# Patient Record
Sex: MALE | Race: WHITE | NOT HISPANIC OR LATINO | Employment: OTHER | URBAN - METROPOLITAN AREA
[De-identification: names, ages, dates, MRNs, and addresses within clinical notes are randomized per-mention and may not be internally consistent; named-entity substitution may affect disease eponyms.]

---

## 2019-10-24 ENCOUNTER — LAB (OUTPATIENT)
Dept: LAB | Facility: HOSPITAL | Age: 84
End: 2019-10-24

## 2019-10-24 ENCOUNTER — HOSPITAL ENCOUNTER (OUTPATIENT)
Dept: CARDIOLOGY | Facility: HOSPITAL | Age: 84
Discharge: HOME | End: 2019-10-24
Admitting: SURGERY

## 2019-10-24 ENCOUNTER — TRANSCRIBE ORDERS (OUTPATIENT)
Dept: ADMINISTRATIVE | Facility: HOSPITAL | Age: 84
End: 2019-10-24

## 2019-10-24 DIAGNOSIS — D64.9 ANEMIA, UNSPECIFIED TYPE: Primary | ICD-10-CM

## 2019-10-24 DIAGNOSIS — E87.8 ELECTROLYTE IMBALANCE: ICD-10-CM

## 2019-10-24 DIAGNOSIS — Z01.810 PRE-OPERATIVE CARDIOVASCULAR EXAMINATION: ICD-10-CM

## 2019-10-24 DIAGNOSIS — D64.9 ANEMIA, UNSPECIFIED TYPE: ICD-10-CM

## 2019-10-24 LAB
ALBUMIN SERPL-MCNC: 4 G/DL (ref 3.5–5.2)
ALBUMIN/GLOB SERPL: 1 G/DL
ALP SERPL-CCNC: 44 U/L (ref 39–117)
ALT SERPL W P-5'-P-CCNC: 16 U/L (ref 1–41)
ANION GAP SERPL CALCULATED.3IONS-SCNC: 9.2 MMOL/L (ref 5–15)
AST SERPL-CCNC: 20 U/L (ref 1–40)
BILIRUB SERPL-MCNC: 0.3 MG/DL (ref 0.2–1.2)
BUN BLD-MCNC: 10 MG/DL (ref 8–23)
BUN/CREAT SERPL: 9.3 (ref 7–25)
CALCIUM SPEC-SCNC: 9.8 MG/DL (ref 8.6–10.5)
CHLORIDE SERPL-SCNC: 90 MMOL/L (ref 98–107)
CO2 SERPL-SCNC: 30.8 MMOL/L (ref 22–29)
CREAT BLD-MCNC: 1.08 MG/DL (ref 0.76–1.27)
GFR SERPL CREATININE-BSD FRML MDRD: 65 ML/MIN/1.73
GLOBULIN UR ELPH-MCNC: 4.1 GM/DL
GLUCOSE BLD-MCNC: 165 MG/DL (ref 65–99)
POTASSIUM BLD-SCNC: 3.6 MMOL/L (ref 3.5–5.2)
PROT SERPL-MCNC: 8.1 G/DL (ref 6–8.5)
SODIUM BLD-SCNC: 130 MMOL/L (ref 136–145)

## 2019-10-24 PROCEDURE — 36415 COLL VENOUS BLD VENIPUNCTURE: CPT

## 2019-10-24 PROCEDURE — 80053 COMPREHEN METABOLIC PANEL: CPT

## 2019-10-24 PROCEDURE — 93005 ELECTROCARDIOGRAM TRACING: CPT | Performed by: SURGERY

## 2019-11-03 PROCEDURE — 93010 ELECTROCARDIOGRAM REPORT: CPT | Performed by: INTERNAL MEDICINE

## 2021-01-01 ENCOUNTER — APPOINTMENT (OUTPATIENT)
Dept: CT IMAGING | Facility: HOSPITAL | Age: 86
End: 2021-01-01

## 2021-01-01 ENCOUNTER — HOSPITAL ENCOUNTER (OUTPATIENT)
Dept: PAIN MEDICINE | Facility: HOSPITAL | Age: 86
Discharge: HOME | End: 2021-09-07

## 2021-01-01 ENCOUNTER — OFFICE (OUTPATIENT)
Dept: URBAN - METROPOLITAN AREA CLINIC 64 | Facility: CLINIC | Age: 86
End: 2021-01-01

## 2021-01-01 ENCOUNTER — APPOINTMENT (OUTPATIENT)
Dept: MRI IMAGING | Facility: HOSPITAL | Age: 86
End: 2021-01-01

## 2021-01-01 ENCOUNTER — HOSPITAL ENCOUNTER (INPATIENT)
Facility: HOSPITAL | Age: 86
LOS: 7 days | End: 2021-10-13
Attending: EMERGENCY MEDICINE | Admitting: FAMILY MEDICINE

## 2021-01-01 ENCOUNTER — APPOINTMENT (OUTPATIENT)
Dept: GENERAL RADIOLOGY | Facility: HOSPITAL | Age: 86
End: 2021-01-01

## 2021-01-01 ENCOUNTER — OFFICE VISIT (OUTPATIENT)
Dept: PAIN MEDICINE | Facility: CLINIC | Age: 86
End: 2021-01-01

## 2021-01-01 ENCOUNTER — APPOINTMENT (OUTPATIENT)
Dept: OTHER | Facility: HOSPITAL | Age: 86
End: 2021-01-01

## 2021-01-01 ENCOUNTER — TELEPHONE (OUTPATIENT)
Dept: PAIN MEDICINE | Facility: CLINIC | Age: 86
End: 2021-01-01

## 2021-01-01 ENCOUNTER — APPOINTMENT (OUTPATIENT)
Dept: ULTRASOUND IMAGING | Facility: HOSPITAL | Age: 86
End: 2021-01-01

## 2021-01-01 ENCOUNTER — TELEPHONE (OUTPATIENT)
Dept: PAIN MEDICINE | Facility: HOSPITAL | Age: 86
End: 2021-01-01

## 2021-01-01 ENCOUNTER — OFFICE (OUTPATIENT)
Dept: URBAN - METROPOLITAN AREA CLINIC 64 | Facility: CLINIC | Age: 86
End: 2021-01-01
Payer: COMMERCIAL

## 2021-01-01 VITALS
HEART RATE: 106 BPM | HEIGHT: 69 IN | BODY MASS INDEX: 27.43 KG/M2 | OXYGEN SATURATION: 90 % | DIASTOLIC BLOOD PRESSURE: 66 MMHG | TEMPERATURE: 98.2 F | SYSTOLIC BLOOD PRESSURE: 110 MMHG | RESPIRATION RATE: 24 BRPM | WEIGHT: 185.19 LBS

## 2021-01-01 VITALS
DIASTOLIC BLOOD PRESSURE: 82 MMHG | HEART RATE: 97 BPM | HEIGHT: 69 IN | SYSTOLIC BLOOD PRESSURE: 124 MMHG | WEIGHT: 195 LBS

## 2021-01-01 VITALS
DIASTOLIC BLOOD PRESSURE: 71 MMHG | WEIGHT: 190 LBS | RESPIRATION RATE: 16 BRPM | SYSTOLIC BLOOD PRESSURE: 149 MMHG | HEART RATE: 82 BPM | HEIGHT: 69 IN | OXYGEN SATURATION: 96 % | TEMPERATURE: 97.8 F | BODY MASS INDEX: 28.14 KG/M2

## 2021-01-01 VITALS
DIASTOLIC BLOOD PRESSURE: 58 MMHG | HEART RATE: 98 BPM | SYSTOLIC BLOOD PRESSURE: 78 MMHG | HEIGHT: 69 IN | WEIGHT: 196 LBS

## 2021-01-01 VITALS
HEIGHT: 69 IN | WEIGHT: 193 LBS | SYSTOLIC BLOOD PRESSURE: 105 MMHG | HEART RATE: 78 BPM | DIASTOLIC BLOOD PRESSURE: 105 MMHG

## 2021-01-01 VITALS
SYSTOLIC BLOOD PRESSURE: 129 MMHG | BODY MASS INDEX: 28.14 KG/M2 | RESPIRATION RATE: 16 BRPM | DIASTOLIC BLOOD PRESSURE: 69 MMHG | WEIGHT: 190 LBS | OXYGEN SATURATION: 92 % | HEIGHT: 69 IN | HEART RATE: 89 BPM

## 2021-01-01 VITALS
WEIGHT: 195 LBS | HEART RATE: 101 BPM | DIASTOLIC BLOOD PRESSURE: 70 MMHG | HEIGHT: 69 IN | SYSTOLIC BLOOD PRESSURE: 120 MMHG

## 2021-01-01 VITALS
BODY MASS INDEX: 28.14 KG/M2 | DIASTOLIC BLOOD PRESSURE: 80 MMHG | SYSTOLIC BLOOD PRESSURE: 155 MMHG | OXYGEN SATURATION: 95 % | HEIGHT: 69 IN | HEART RATE: 85 BPM | RESPIRATION RATE: 16 BRPM | WEIGHT: 190 LBS

## 2021-01-01 DIAGNOSIS — Z79.899 HIGH RISK MEDICATION USE: ICD-10-CM

## 2021-01-01 DIAGNOSIS — M50.30 DDD (DEGENERATIVE DISC DISEASE), CERVICAL: ICD-10-CM

## 2021-01-01 DIAGNOSIS — K59.00 CONSTIPATION, UNSPECIFIED: ICD-10-CM

## 2021-01-01 DIAGNOSIS — M47.812 CERVICAL SPONDYLOSIS: ICD-10-CM

## 2021-01-01 DIAGNOSIS — R53.1 WEAKNESS: ICD-10-CM

## 2021-01-01 DIAGNOSIS — Z79.899 HIGH RISK MEDICATION USE: Primary | ICD-10-CM

## 2021-01-01 DIAGNOSIS — K60.2 ANAL FISSURE, UNSPECIFIED: ICD-10-CM

## 2021-01-01 DIAGNOSIS — K62.5 HEMORRHAGE OF ANUS AND RECTUM: ICD-10-CM

## 2021-01-01 DIAGNOSIS — L22: ICD-10-CM

## 2021-01-01 DIAGNOSIS — Z09 FOLLOW UP: ICD-10-CM

## 2021-01-01 DIAGNOSIS — E87.1 HYPONATREMIA: ICD-10-CM

## 2021-01-01 DIAGNOSIS — D72.829 ELEVATED WHITE BLOOD CELL COUNT, UNSPECIFIED: ICD-10-CM

## 2021-01-01 DIAGNOSIS — M50.30 DDD (DEGENERATIVE DISC DISEASE), CERVICAL: Primary | ICD-10-CM

## 2021-01-01 DIAGNOSIS — K62.89 OTHER SPECIFIED DISEASES OF ANUS AND RECTUM: ICD-10-CM

## 2021-01-01 DIAGNOSIS — R91.8 LUNG MASS: ICD-10-CM

## 2021-01-01 DIAGNOSIS — E87.6 HYPOKALEMIA: ICD-10-CM

## 2021-01-01 DIAGNOSIS — A41.9 SEPSIS, DUE TO UNSPECIFIED ORGANISM, UNSPECIFIED WHETHER ACUTE ORGAN DYSFUNCTION PRESENT (HCC): Primary | ICD-10-CM

## 2021-01-01 DIAGNOSIS — J18.9 PNEUMONIA OF BOTH LUNGS DUE TO INFECTIOUS ORGANISM, UNSPECIFIED PART OF LUNG: ICD-10-CM

## 2021-01-01 DIAGNOSIS — R52 PAIN: ICD-10-CM

## 2021-01-01 LAB
ALBUMIN SERPL-MCNC: 2.5 G/DL (ref 3.5–5.2)
ALBUMIN SERPL-MCNC: 2.6 G/DL (ref 3.5–5.2)
ALBUMIN SERPL-MCNC: 2.6 G/DL (ref 3.5–5.2)
ALBUMIN SERPL-MCNC: 2.7 G/DL (ref 3.5–5.2)
ALBUMIN SERPL-MCNC: 2.9 G/DL (ref 3.5–5.2)
ALBUMIN SERPL-MCNC: 3 G/DL (ref 3.5–5.2)
ALBUMIN SERPL-MCNC: 3.3 G/DL (ref 3.5–5.2)
ALBUMIN/GLOB SERPL: 0.7 G/DL
ALBUMIN/GLOB SERPL: 0.8 G/DL
ALBUMIN/GLOB SERPL: 0.8 G/DL
ALP SERPL-CCNC: 68 U/L (ref 39–117)
ALP SERPL-CCNC: 70 U/L (ref 39–117)
ALP SERPL-CCNC: 74 U/L (ref 39–117)
ALP SERPL-CCNC: 77 U/L (ref 39–117)
ALP SERPL-CCNC: 782 U/L (ref 39–117)
ALP SERPL-CCNC: 83 U/L (ref 39–117)
ALP SERPL-CCNC: 88 U/L (ref 39–117)
ALT SERPL W P-5'-P-CCNC: 177 U/L (ref 1–41)
ALT SERPL W P-5'-P-CCNC: 198 U/L (ref 1–41)
ALT SERPL W P-5'-P-CCNC: 412 U/L (ref 1–41)
ALT SERPL W P-5'-P-CCNC: 59 U/L (ref 1–41)
ALT SERPL W P-5'-P-CCNC: 592 U/L (ref 1–41)
ALT SERPL W P-5'-P-CCNC: 881 U/L (ref 1–41)
ALT SERPL W P-5'-P-CCNC: 92 U/L (ref 1–41)
ANION GAP SERPL CALCULATED.3IONS-SCNC: 12 MMOL/L (ref 5–15)
ANION GAP SERPL CALCULATED.3IONS-SCNC: 13 MMOL/L (ref 5–15)
ANION GAP SERPL CALCULATED.3IONS-SCNC: 13 MMOL/L (ref 5–15)
ANION GAP SERPL CALCULATED.3IONS-SCNC: 14 MMOL/L (ref 5–15)
ANION GAP SERPL CALCULATED.3IONS-SCNC: 16 MMOL/L (ref 5–15)
ANION GAP SERPL CALCULATED.3IONS-SCNC: 24 MMOL/L (ref 5–15)
ANION GAP SERPL CALCULATED.3IONS-SCNC: 8 MMOL/L (ref 5–15)
ANION GAP SERPL CALCULATED.3IONS-SCNC: 9 MMOL/L (ref 5–15)
APTT PPP: 30.8 SECONDS (ref 24–31)
ARTERIAL PATENCY WRIST A: POSITIVE
AST SERPL-CCNC: 145 U/L (ref 1–40)
AST SERPL-CCNC: 155 U/L (ref 1–40)
AST SERPL-CCNC: 236 U/L (ref 1–40)
AST SERPL-CCNC: 260 U/L (ref 1–40)
AST SERPL-CCNC: 67 U/L (ref 1–40)
AST SERPL-CCNC: 814 U/L (ref 1–40)
AST SERPL-CCNC: 965 U/L (ref 1–40)
ATMOSPHERIC PRESS: ABNORMAL MM[HG]
BACTERIA SPEC AEROBE CULT: NO GROWTH
BACTERIA SPEC AEROBE CULT: NORMAL
BACTERIA SPEC AEROBE CULT: NORMAL
BACTERIA UR QL AUTO: ABNORMAL /HPF
BASE EXCESS BLDA CALC-SCNC: 2.1 MMOL/L (ref 0–3)
BASOPHILS # BLD AUTO: 0.1 10*3/MM3 (ref 0–0.2)
BASOPHILS NFR BLD AUTO: 0.6 % (ref 0–1.5)
BASOPHILS NFR BLD AUTO: 0.7 % (ref 0–1.5)
BASOPHILS NFR BLD AUTO: 0.8 % (ref 0–1.5)
BASOPHILS NFR BLD AUTO: 0.9 % (ref 0–1.5)
BDY SITE: ABNORMAL
BILIRUB CONJ SERPL-MCNC: 0.4 MG/DL (ref 0–0.3)
BILIRUB INDIRECT SERPL-MCNC: 0.5 MG/DL
BILIRUB SERPL-MCNC: 0.5 MG/DL (ref 0–1.2)
BILIRUB SERPL-MCNC: 0.6 MG/DL (ref 0–1.2)
BILIRUB SERPL-MCNC: 0.6 MG/DL (ref 0–1.2)
BILIRUB SERPL-MCNC: 0.7 MG/DL (ref 0–1.2)
BILIRUB SERPL-MCNC: 0.8 MG/DL (ref 0–1.2)
BILIRUB SERPL-MCNC: 0.9 MG/DL (ref 0–1.2)
BILIRUB SERPL-MCNC: 0.9 MG/DL (ref 0–1.2)
BILIRUB UR QL STRIP: NEGATIVE
BUN SERPL-MCNC: 14 MG/DL (ref 8–23)
BUN SERPL-MCNC: 15 MG/DL (ref 8–23)
BUN SERPL-MCNC: 16 MG/DL (ref 8–23)
BUN SERPL-MCNC: 16 MG/DL (ref 8–23)
BUN SERPL-MCNC: 18 MG/DL (ref 8–23)
BUN SERPL-MCNC: 20 MG/DL (ref 8–23)
BUN SERPL-MCNC: 20 MG/DL (ref 8–23)
BUN SERPL-MCNC: 21 MG/DL (ref 8–23)
BUN SERPL-MCNC: 22 MG/DL (ref 8–23)
BUN SERPL-MCNC: 31 MG/DL (ref 8–23)
BUN/CREAT SERPL: 12.6 (ref 7–25)
BUN/CREAT SERPL: 16.7 (ref 7–25)
BUN/CREAT SERPL: 20.9 (ref 7–25)
BUN/CREAT SERPL: 25.4 (ref 7–25)
BUN/CREAT SERPL: 25.8 (ref 7–25)
BUN/CREAT SERPL: 29 (ref 7–25)
BUN/CREAT SERPL: 31.8 (ref 7–25)
BUN/CREAT SERPL: 32.3 (ref 7–25)
BUN/CREAT SERPL: 32.3 (ref 7–25)
BUN/CREAT SERPL: 33.3 (ref 7–25)
BUN/CREAT SERPL: 33.8 (ref 7–25)
CA-I BLD-MCNC: 4.8 MG/DL (ref 4.6–5.4)
CA-I SERPL ISE-MCNC: 1.2 MMOL/L (ref 1.15–1.35)
CALCIUM SPEC-SCNC: 7.5 MG/DL (ref 8.6–10.5)
CALCIUM SPEC-SCNC: 8.2 MG/DL (ref 8.6–10.5)
CALCIUM SPEC-SCNC: 8.3 MG/DL (ref 8.6–10.5)
CALCIUM SPEC-SCNC: 8.3 MG/DL (ref 8.6–10.5)
CALCIUM SPEC-SCNC: 8.4 MG/DL (ref 8.6–10.5)
CALCIUM SPEC-SCNC: 8.4 MG/DL (ref 8.6–10.5)
CALCIUM SPEC-SCNC: 8.5 MG/DL (ref 8.6–10.5)
CALCIUM SPEC-SCNC: 8.5 MG/DL (ref 8.6–10.5)
CALCIUM SPEC-SCNC: 8.6 MG/DL (ref 8.6–10.5)
CALCIUM SPEC-SCNC: 8.6 MG/DL (ref 8.6–10.5)
CALCIUM SPEC-SCNC: 8.8 MG/DL (ref 8.6–10.5)
CALCIUM SPEC-SCNC: 8.9 MG/DL (ref 8.6–10.5)
CALCIUM SPEC-SCNC: 9 MG/DL (ref 8.6–10.5)
CHLORIDE SERPL-SCNC: 100 MMOL/L (ref 98–107)
CHLORIDE SERPL-SCNC: 75 MMOL/L (ref 98–107)
CHLORIDE SERPL-SCNC: 82 MMOL/L (ref 98–107)
CHLORIDE SERPL-SCNC: 85 MMOL/L (ref 98–107)
CHLORIDE SERPL-SCNC: 87 MMOL/L (ref 98–107)
CHLORIDE SERPL-SCNC: 87 MMOL/L (ref 98–107)
CHLORIDE SERPL-SCNC: 88 MMOL/L (ref 98–107)
CHLORIDE SERPL-SCNC: 89 MMOL/L (ref 98–107)
CHLORIDE SERPL-SCNC: 89 MMOL/L (ref 98–107)
CHLORIDE SERPL-SCNC: 91 MMOL/L (ref 98–107)
CHLORIDE SERPL-SCNC: 92 MMOL/L (ref 98–107)
CK SERPL-CCNC: 66 U/L (ref 20–200)
CK SERPL-CCNC: 97 U/L (ref 20–200)
CLARITY UR: ABNORMAL
CO2 BLDA-SCNC: 27.1 MMOL/L (ref 22–29)
CO2 SERPL-SCNC: 21 MMOL/L (ref 22–29)
CO2 SERPL-SCNC: 24 MMOL/L (ref 22–29)
CO2 SERPL-SCNC: 24 MMOL/L (ref 22–29)
CO2 SERPL-SCNC: 25 MMOL/L (ref 22–29)
CO2 SERPL-SCNC: 25 MMOL/L (ref 22–29)
CO2 SERPL-SCNC: 26 MMOL/L (ref 22–29)
CO2 SERPL-SCNC: 27 MMOL/L (ref 22–29)
CO2 SERPL-SCNC: 28 MMOL/L (ref 22–29)
CO2 SERPL-SCNC: 29 MMOL/L (ref 22–29)
CO2 SERPL-SCNC: 30 MMOL/L (ref 22–29)
CO2 SERPL-SCNC: 30 MMOL/L (ref 22–29)
COLOR UR: YELLOW
CREAT SERPL-MCNC: 0.59 MG/DL (ref 0.76–1.27)
CREAT SERPL-MCNC: 0.59 MG/DL (ref 0.76–1.27)
CREAT SERPL-MCNC: 0.62 MG/DL (ref 0.76–1.27)
CREAT SERPL-MCNC: 0.65 MG/DL (ref 0.76–1.27)
CREAT SERPL-MCNC: 0.66 MG/DL (ref 0.76–1.27)
CREAT SERPL-MCNC: 0.66 MG/DL (ref 0.76–1.27)
CREAT SERPL-MCNC: 0.67 MG/DL (ref 0.76–1.27)
CREAT SERPL-MCNC: 0.96 MG/DL (ref 0.76–1.27)
CREAT SERPL-MCNC: 2.46 MG/DL (ref 0.76–1.27)
D-LACTATE SERPL-SCNC: 4 MMOL/L (ref 0.5–2)
D-LACTATE SERPL-SCNC: 4.1 MMOL/L (ref 0.5–2)
DEPRECATED RDW RBC AUTO: 42.4 FL (ref 37–54)
DEPRECATED RDW RBC AUTO: 43.3 FL (ref 37–54)
DEPRECATED RDW RBC AUTO: 43.3 FL (ref 37–54)
DEPRECATED RDW RBC AUTO: 44.2 FL (ref 37–54)
DEPRECATED RDW RBC AUTO: 45.5 FL (ref 37–54)
DEPRECATED RDW RBC AUTO: 45.5 FL (ref 37–54)
DEPRECATED RDW RBC AUTO: 49.4 FL (ref 37–54)
EOSINOPHIL # BLD AUTO: 0 10*3/MM3 (ref 0–0.4)
EOSINOPHIL # BLD AUTO: 0.1 10*3/MM3 (ref 0–0.4)
EOSINOPHIL NFR BLD AUTO: 0.2 % (ref 0.3–6.2)
EOSINOPHIL NFR BLD AUTO: 0.2 % (ref 0.3–6.2)
EOSINOPHIL NFR BLD AUTO: 0.3 % (ref 0.3–6.2)
EOSINOPHIL NFR BLD AUTO: 1.4 % (ref 0.3–6.2)
ERYTHROCYTE [DISTWIDTH] IN BLOOD BY AUTOMATED COUNT: 14.2 % (ref 12.3–15.4)
ERYTHROCYTE [DISTWIDTH] IN BLOOD BY AUTOMATED COUNT: 14.2 % (ref 12.3–15.4)
ERYTHROCYTE [DISTWIDTH] IN BLOOD BY AUTOMATED COUNT: 14.4 % (ref 12.3–15.4)
ERYTHROCYTE [DISTWIDTH] IN BLOOD BY AUTOMATED COUNT: 14.6 % (ref 12.3–15.4)
ERYTHROCYTE [DISTWIDTH] IN BLOOD BY AUTOMATED COUNT: 14.8 % (ref 12.3–15.4)
ERYTHROCYTE [DISTWIDTH] IN BLOOD BY AUTOMATED COUNT: 14.9 % (ref 12.3–15.4)
ERYTHROCYTE [DISTWIDTH] IN BLOOD BY AUTOMATED COUNT: 15.8 % (ref 12.3–15.4)
FERRITIN SERPL-MCNC: 951.7 NG/ML (ref 30–400)
FOLATE SERPL-MCNC: 13.4 NG/ML (ref 4.78–24.2)
GFR SERPL CREATININE-BSD FRML MDRD: 113 ML/MIN/1.73
GFR SERPL CREATININE-BSD FRML MDRD: 115 ML/MIN/1.73
GFR SERPL CREATININE-BSD FRML MDRD: 115 ML/MIN/1.73
GFR SERPL CREATININE-BSD FRML MDRD: 117 ML/MIN/1.73
GFR SERPL CREATININE-BSD FRML MDRD: 124 ML/MIN/1.73
GFR SERPL CREATININE-BSD FRML MDRD: 131 ML/MIN/1.73
GFR SERPL CREATININE-BSD FRML MDRD: 131 ML/MIN/1.73
GFR SERPL CREATININE-BSD FRML MDRD: 25 ML/MIN/1.73
GFR SERPL CREATININE-BSD FRML MDRD: 75 ML/MIN/1.73
GLOBULIN UR ELPH-MCNC: 3.1 GM/DL
GLOBULIN UR ELPH-MCNC: 3.9 GM/DL
GLOBULIN UR ELPH-MCNC: 4 GM/DL
GLOBULIN UR ELPH-MCNC: 4.1 GM/DL
GLOBULIN UR ELPH-MCNC: 4.1 GM/DL
GLOBULIN UR ELPH-MCNC: 4.4 GM/DL
GLUCOSE BLDC GLUCOMTR-MCNC: 206 MG/DL (ref 70–105)
GLUCOSE SERPL-MCNC: 101 MG/DL (ref 65–99)
GLUCOSE SERPL-MCNC: 103 MG/DL (ref 65–99)
GLUCOSE SERPL-MCNC: 103 MG/DL (ref 65–99)
GLUCOSE SERPL-MCNC: 111 MG/DL (ref 65–99)
GLUCOSE SERPL-MCNC: 114 MG/DL (ref 65–99)
GLUCOSE SERPL-MCNC: 128 MG/DL (ref 65–99)
GLUCOSE SERPL-MCNC: 132 MG/DL (ref 65–99)
GLUCOSE SERPL-MCNC: 135 MG/DL (ref 65–99)
GLUCOSE SERPL-MCNC: 145 MG/DL (ref 65–99)
GLUCOSE SERPL-MCNC: 153 MG/DL (ref 65–99)
GLUCOSE SERPL-MCNC: 178 MG/DL (ref 65–99)
GLUCOSE SERPL-MCNC: 87 MG/DL (ref 65–99)
GLUCOSE SERPL-MCNC: 90 MG/DL (ref 65–99)
GLUCOSE UR STRIP-MCNC: NEGATIVE MG/DL
HAV IGM SERPL QL IA: NEGATIVE
HAV IGM SERPL QL IA: NORMAL
HBV CORE IGM SERPL QL IA: NEGATIVE
HBV CORE IGM SERPL QL IA: NORMAL
HBV SURFACE AG SERPL QL IA: NEGATIVE
HBV SURFACE AG SERPL QL IA: NORMAL
HCO3 BLDA-SCNC: 26 MMOL/L (ref 21–28)
HCT VFR BLD AUTO: 22.8 % (ref 37.5–51)
HCT VFR BLD AUTO: 24.3 % (ref 37.5–51)
HCT VFR BLD AUTO: 26.7 % (ref 37.5–51)
HCT VFR BLD AUTO: 33.1 % (ref 37.5–51)
HCT VFR BLD AUTO: 33.8 % (ref 37.5–51)
HCT VFR BLD AUTO: 34.3 % (ref 37.5–51)
HCT VFR BLD AUTO: 36.9 % (ref 37.5–51)
HCV AB SER DONR QL: NORMAL
HEMODILUTION: NO
HGB BLD-MCNC: 11.1 G/DL (ref 13–17.7)
HGB BLD-MCNC: 11.7 G/DL (ref 13–17.7)
HGB BLD-MCNC: 11.7 G/DL (ref 13–17.7)
HGB BLD-MCNC: 12.6 G/DL (ref 13–17.7)
HGB BLD-MCNC: 7.6 G/DL (ref 13–17.7)
HGB BLD-MCNC: 8.3 G/DL (ref 13–17.7)
HGB BLD-MCNC: 9.2 G/DL (ref 13–17.7)
HGB UR QL STRIP.AUTO: ABNORMAL
HOLD SPECIMEN: NORMAL
HYALINE CASTS UR QL AUTO: ABNORMAL /LPF
IGA SERPL-MCNC: 558 MG/DL (ref 61–437)
IGA SERPL-MCNC: 578 MG/DL (ref 61–437)
IGG SERPL-MCNC: 1703 MG/DL (ref 603–1613)
IGG SERPL-MCNC: 1804 MG/DL (ref 603–1613)
IGM SERPL-MCNC: 66 MG/DL (ref 15–143)
IGM SERPL-MCNC: 68 MG/DL (ref 15–143)
INHALED O2 CONCENTRATION: 100 %
INR PPP: 1.18 (ref 0.93–1.1)
INTERPRETATION UR IFE-IMP: ABNORMAL
IRON 24H UR-MRATE: 29 MCG/DL (ref 59–158)
IRON SATN MFR SERPL: 9 % (ref 20–50)
KAPPA LC FREE SER-MCNC: 83 MG/L (ref 3.3–19.4)
KAPPA LC FREE/LAMBDA FREE SER: 2.18 {RATIO} (ref 0.26–1.65)
KETONES UR QL STRIP: NEGATIVE
LAB AP CASE REPORT: NORMAL
LAB AP DIAGNOSIS COMMENT: NORMAL
LAMBDA LC FREE SERPL-MCNC: 38.1 MG/L (ref 5.7–26.3)
LEUKOCYTE ESTERASE UR QL STRIP.AUTO: NEGATIVE
LIPASE SERPL-CCNC: 47 U/L (ref 13–60)
LYMPHOCYTES # BLD AUTO: 0.7 10*3/MM3 (ref 0.7–3.1)
LYMPHOCYTES # BLD AUTO: 1.8 10*3/MM3 (ref 0.7–3.1)
LYMPHOCYTES # BLD AUTO: 2 10*3/MM3 (ref 0.7–3.1)
LYMPHOCYTES # BLD AUTO: 2.1 10*3/MM3 (ref 0.7–3.1)
LYMPHOCYTES NFR BLD AUTO: 10.5 % (ref 19.6–45.3)
LYMPHOCYTES NFR BLD AUTO: 12.7 % (ref 19.6–45.3)
LYMPHOCYTES NFR BLD AUTO: 13 % (ref 19.6–45.3)
LYMPHOCYTES NFR BLD AUTO: 8.1 % (ref 19.6–45.3)
Lab: NORMAL
MAGNESIUM SERPL-MCNC: 1.5 MG/DL (ref 1.6–2.4)
MAGNESIUM SERPL-MCNC: 1.5 MG/DL (ref 1.6–2.4)
MAGNESIUM SERPL-MCNC: 1.8 MG/DL (ref 1.6–2.4)
MAGNESIUM SERPL-MCNC: 1.9 MG/DL (ref 1.6–2.4)
MAGNESIUM SERPL-MCNC: 2.2 MG/DL (ref 1.6–2.4)
MCH RBC QN AUTO: 29.5 PG (ref 26.6–33)
MCH RBC QN AUTO: 29.5 PG (ref 26.6–33)
MCH RBC QN AUTO: 29.6 PG (ref 26.6–33)
MCH RBC QN AUTO: 29.9 PG (ref 26.6–33)
MCH RBC QN AUTO: 30 PG (ref 26.6–33)
MCH RBC QN AUTO: 30.2 PG (ref 26.6–33)
MCH RBC QN AUTO: 30.5 PG (ref 26.6–33)
MCHC RBC AUTO-ENTMCNC: 33.4 G/DL (ref 31.5–35.7)
MCHC RBC AUTO-ENTMCNC: 33.5 G/DL (ref 31.5–35.7)
MCHC RBC AUTO-ENTMCNC: 34.1 G/DL (ref 31.5–35.7)
MCHC RBC AUTO-ENTMCNC: 34.4 G/DL (ref 31.5–35.7)
MCHC RBC AUTO-ENTMCNC: 34.6 G/DL (ref 31.5–35.7)
MCV RBC AUTO: 86.3 FL (ref 79–97)
MCV RBC AUTO: 86.4 FL (ref 79–97)
MCV RBC AUTO: 86.8 FL (ref 79–97)
MCV RBC AUTO: 87.9 FL (ref 79–97)
MCV RBC AUTO: 88.4 FL (ref 79–97)
MCV RBC AUTO: 88.6 FL (ref 79–97)
MCV RBC AUTO: 89.9 FL (ref 79–97)
MODALITY: ABNORMAL
MONOCYTES # BLD AUTO: 0.4 10*3/MM3 (ref 0.1–0.9)
MONOCYTES # BLD AUTO: 1.7 10*3/MM3 (ref 0.1–0.9)
MONOCYTES # BLD AUTO: 1.9 10*3/MM3 (ref 0.1–0.9)
MONOCYTES # BLD AUTO: 1.9 10*3/MM3 (ref 0.1–0.9)
MONOCYTES NFR BLD AUTO: 12.3 % (ref 5–12)
MONOCYTES NFR BLD AUTO: 13.4 % (ref 5–12)
MONOCYTES NFR BLD AUTO: 4.7 % (ref 5–12)
MONOCYTES NFR BLD AUTO: 8.8 % (ref 5–12)
MRSA DNA SPEC QL NAA+PROBE: NORMAL
NEUTROPHILS NFR BLD AUTO: 10.4 10*3/MM3 (ref 1.7–7)
NEUTROPHILS NFR BLD AUTO: 11.5 10*3/MM3 (ref 1.7–7)
NEUTROPHILS NFR BLD AUTO: 15.7 10*3/MM3 (ref 1.7–7)
NEUTROPHILS NFR BLD AUTO: 6.9 10*3/MM3 (ref 1.7–7)
NEUTROPHILS NFR BLD AUTO: 73 % (ref 42.7–76)
NEUTROPHILS NFR BLD AUTO: 73.6 % (ref 42.7–76)
NEUTROPHILS NFR BLD AUTO: 79.7 % (ref 42.7–76)
NEUTROPHILS NFR BLD AUTO: 85.1 % (ref 42.7–76)
NITRITE UR QL STRIP: NEGATIVE
NRBC BLD AUTO-RTO: 0 /100 WBC (ref 0–0.2)
NRBC BLD AUTO-RTO: 0.1 /100 WBC (ref 0–0.2)
NRBC BLD AUTO-RTO: 0.2 /100 WBC (ref 0–0.2)
NRBC BLD AUTO-RTO: 0.2 /100 WBC (ref 0–0.2)
NT-PROBNP SERPL-MCNC: 2003 PG/ML (ref 0–1800)
OSMOLALITY UR: 568 MOSM/KG (ref 300–800)
PATH REPORT.FINAL DX SPEC: NORMAL
PATH REPORT.GROSS SPEC: NORMAL
PCO2 BLDA: 36.4 MM HG (ref 35–48)
PH BLDA: 7.46 PH UNITS (ref 7.35–7.45)
PH UR STRIP.AUTO: 5.5 [PH] (ref 5–8)
PHOSPHATE SERPL-MCNC: 2.1 MG/DL (ref 2.5–4.5)
PHOSPHATE SERPL-MCNC: 2.3 MG/DL (ref 2.5–4.5)
PLATELET # BLD AUTO: 164 10*3/MM3 (ref 140–450)
PLATELET # BLD AUTO: 300 10*3/MM3 (ref 140–450)
PLATELET # BLD AUTO: 316 10*3/MM3 (ref 140–450)
PLATELET # BLD AUTO: 317 10*3/MM3 (ref 140–450)
PLATELET # BLD AUTO: 363 10*3/MM3 (ref 140–450)
PLATELET # BLD AUTO: 392 10*3/MM3 (ref 140–450)
PLATELET # BLD AUTO: 439 10*3/MM3 (ref 140–450)
PMV BLD AUTO: 7.2 FL (ref 6–12)
PMV BLD AUTO: 7.2 FL (ref 6–12)
PMV BLD AUTO: 7.3 FL (ref 6–12)
PMV BLD AUTO: 7.4 FL (ref 6–12)
PMV BLD AUTO: 7.5 FL (ref 6–12)
PMV BLD AUTO: 7.6 FL (ref 6–12)
PMV BLD AUTO: 8.8 FL (ref 6–12)
PO2 BLDA: 86.1 MM HG (ref 83–108)
POTASSIUM SERPL-SCNC: 2.5 MMOL/L (ref 3.5–5.2)
POTASSIUM SERPL-SCNC: 3.1 MMOL/L (ref 3.5–5.2)
POTASSIUM SERPL-SCNC: 3.5 MMOL/L (ref 3.5–5.2)
POTASSIUM SERPL-SCNC: 3.6 MMOL/L (ref 3.5–5.2)
POTASSIUM SERPL-SCNC: 3.7 MMOL/L (ref 3.5–5.2)
POTASSIUM SERPL-SCNC: 3.7 MMOL/L (ref 3.5–5.2)
POTASSIUM SERPL-SCNC: 3.9 MMOL/L (ref 3.5–5.2)
POTASSIUM SERPL-SCNC: 4 MMOL/L (ref 3.5–5.2)
PROT PATTERN SERPL IFE-IMP: ABNORMAL
PROT PATTERN SERPL IFE-IMP: ABNORMAL
PROT SERPL-MCNC: 5.7 G/DL (ref 6–8.5)
PROT SERPL-MCNC: 6.6 G/DL (ref 6–8.5)
PROT SERPL-MCNC: 6.6 G/DL (ref 6–8.5)
PROT SERPL-MCNC: 6.7 G/DL (ref 6–8.5)
PROT SERPL-MCNC: 7 G/DL (ref 6–8.5)
PROT SERPL-MCNC: 7.1 G/DL (ref 6–8.5)
PROT SERPL-MCNC: 7.7 G/DL (ref 6–8.5)
PROT UR QL STRIP: NEGATIVE
PROTHROMBIN TIME: 12.9 SECONDS (ref 9.6–11.7)
QT INTERVAL: 401 MS
RBC # BLD AUTO: 2.54 10*6/MM3 (ref 4.14–5.8)
RBC # BLD AUTO: 2.74 10*6/MM3 (ref 4.14–5.8)
RBC # BLD AUTO: 3.01 10*6/MM3 (ref 4.14–5.8)
RBC # BLD AUTO: 3.76 10*6/MM3 (ref 4.14–5.8)
RBC # BLD AUTO: 3.92 10*6/MM3 (ref 4.14–5.8)
RBC # BLD AUTO: 3.95 10*6/MM3 (ref 4.14–5.8)
RBC # BLD AUTO: 4.27 10*6/MM3 (ref 4.14–5.8)
RBC # UR: ABNORMAL /HPF
REF LAB TEST METHOD: ABNORMAL
SAO2 % BLDCOA: 97.1 % (ref 94–98)
SARS-COV-2 RNA PNL SPEC NAA+PROBE: NOT DETECTED
SODIUM SERPL-SCNC: 120 MMOL/L (ref 136–145)
SODIUM SERPL-SCNC: 123 MMOL/L (ref 136–145)
SODIUM SERPL-SCNC: 124 MMOL/L (ref 136–145)
SODIUM SERPL-SCNC: 125 MMOL/L (ref 136–145)
SODIUM SERPL-SCNC: 127 MMOL/L (ref 136–145)
SODIUM SERPL-SCNC: 129 MMOL/L (ref 136–145)
SODIUM SERPL-SCNC: 129 MMOL/L (ref 136–145)
SODIUM SERPL-SCNC: 130 MMOL/L (ref 136–145)
SODIUM SERPL-SCNC: 130 MMOL/L (ref 136–145)
SODIUM SERPL-SCNC: 132 MMOL/L (ref 136–145)
SODIUM SERPL-SCNC: 140 MMOL/L (ref 136–145)
SODIUM UR-SCNC: 27 MMOL/L
SP GR UR STRIP: 1.02 (ref 1–1.03)
SQUAMOUS #/AREA URNS HPF: ABNORMAL /HPF
TIBC SERPL-MCNC: 322 MCG/DL (ref 298–536)
TRANSFERRIN SERPL-MCNC: 216 MG/DL (ref 200–360)
TROPONIN T SERPL-MCNC: <0.01 NG/ML (ref 0–0.03)
TSH SERPL DL<=0.05 MIU/L-ACNC: 1.42 UIU/ML (ref 0.27–4.2)
URATE SERPL-MCNC: 4.3 MG/DL (ref 3.4–7)
UROBILINOGEN UR QL STRIP: ABNORMAL
VIT B12 BLD-MCNC: >2000 PG/ML (ref 211–946)
WBC # BLD AUTO: 10.1 10*3/MM3 (ref 3.4–10.8)
WBC # BLD AUTO: 13 10*3/MM3 (ref 3.4–10.8)
WBC # BLD AUTO: 14.2 10*3/MM3 (ref 3.4–10.8)
WBC # BLD AUTO: 15.6 10*3/MM3 (ref 3.4–10.8)
WBC # BLD AUTO: 17.1 10*3/MM3 (ref 3.4–10.8)
WBC # BLD AUTO: 19.6 10*3/MM3 (ref 3.4–10.8)
WBC # BLD AUTO: 8.1 10*3/MM3 (ref 3.4–10.8)
WBC UR QL AUTO: ABNORMAL /HPF

## 2021-01-01 PROCEDURE — 0BBF3ZX EXCISION OF RIGHT LOWER LUNG LOBE, PERCUTANEOUS APPROACH, DIAGNOSTIC: ICD-10-PCS | Performed by: INTERNAL MEDICINE

## 2021-01-01 PROCEDURE — 71045 X-RAY EXAM CHEST 1 VIEW: CPT

## 2021-01-01 PROCEDURE — 76705 ECHO EXAM OF ABDOMEN: CPT

## 2021-01-01 PROCEDURE — 25010000002 VANCOMYCIN PER 500 MG: Performed by: EMERGENCY MEDICINE

## 2021-01-01 PROCEDURE — 87340 HEPATITIS B SURFACE AG IA: CPT | Performed by: INTERNAL MEDICINE

## 2021-01-01 PROCEDURE — 25010000002 GADOTERIDOL PER 1 ML: Performed by: FAMILY MEDICINE

## 2021-01-01 PROCEDURE — 94799 UNLISTED PULMONARY SVC/PX: CPT

## 2021-01-01 PROCEDURE — 82550 ASSAY OF CK (CPK): CPT | Performed by: INTERNAL MEDICINE

## 2021-01-01 PROCEDURE — 83880 ASSAY OF NATRIURETIC PEPTIDE: CPT | Performed by: INTERNAL MEDICINE

## 2021-01-01 PROCEDURE — 25010000002 MIDAZOLAM PER 1 MG: Performed by: RADIOLOGY

## 2021-01-01 PROCEDURE — 36415 COLL VENOUS BLD VENIPUNCTURE: CPT | Performed by: INTERNAL MEDICINE

## 2021-01-01 PROCEDURE — 25010000002 MORPHINE PER 10 MG: Performed by: NURSE PRACTITIONER

## 2021-01-01 PROCEDURE — 85025 COMPLETE CBC W/AUTO DIFF WBC: CPT | Performed by: EMERGENCY MEDICINE

## 2021-01-01 PROCEDURE — 77003 FLUOROGUIDE FOR SPINE INJECT: CPT

## 2021-01-01 PROCEDURE — 25010000002 CEFEPIME PER 500 MG: Performed by: EMERGENCY MEDICINE

## 2021-01-01 PROCEDURE — 82803 BLOOD GASES ANY COMBINATION: CPT

## 2021-01-01 PROCEDURE — 85730 THROMBOPLASTIN TIME PARTIAL: CPT | Performed by: EMERGENCY MEDICINE

## 2021-01-01 PROCEDURE — 99213 OFFICE O/P EST LOW 20 MIN: CPT | Performed by: NURSE PRACTITIONER

## 2021-01-01 PROCEDURE — 81001 URINALYSIS AUTO W/SCOPE: CPT | Performed by: EMERGENCY MEDICINE

## 2021-01-01 PROCEDURE — 25010000002 CEFTRIAXONE PER 250 MG: Performed by: NURSE PRACTITIONER

## 2021-01-01 PROCEDURE — 80053 COMPREHEN METABOLIC PANEL: CPT | Performed by: INTERNAL MEDICINE

## 2021-01-01 PROCEDURE — 99152 MOD SED SAME PHYS/QHP 5/>YRS: CPT

## 2021-01-01 PROCEDURE — 88305 TISSUE EXAM BY PATHOLOGIST: CPT | Performed by: INTERNAL MEDICINE

## 2021-01-01 PROCEDURE — 25010000002 VANCOMYCIN 10 G RECONSTITUTED SOLUTION: Performed by: EMERGENCY MEDICINE

## 2021-01-01 PROCEDURE — 25010000003 LIDOCAINE 1 % SOLUTION: Performed by: RADIOLOGY

## 2021-01-01 PROCEDURE — 85610 PROTHROMBIN TIME: CPT | Performed by: EMERGENCY MEDICINE

## 2021-01-01 PROCEDURE — 0 IOPAMIDOL PER 1 ML: Performed by: EMERGENCY MEDICINE

## 2021-01-01 PROCEDURE — 25010000002 MORPHINE SULFATE (PF) 2 MG/ML SOLUTION: Performed by: NURSE PRACTITIONER

## 2021-01-01 PROCEDURE — 85025 COMPLETE CBC W/AUTO DIFF WBC: CPT | Performed by: FAMILY MEDICINE

## 2021-01-01 PROCEDURE — 72125 CT NECK SPINE W/O DYE: CPT

## 2021-01-01 PROCEDURE — 97530 THERAPEUTIC ACTIVITIES: CPT

## 2021-01-01 PROCEDURE — 80074 ACUTE HEPATITIS PANEL: CPT | Performed by: FAMILY MEDICINE

## 2021-01-01 PROCEDURE — 70450 CT HEAD/BRAIN W/O DYE: CPT

## 2021-01-01 PROCEDURE — 97162 PT EVAL MOD COMPLEX 30 MIN: CPT

## 2021-01-01 PROCEDURE — 88334 PATH CONSLTJ SURG CYTO XM EA: CPT | Performed by: INTERNAL MEDICINE

## 2021-01-01 PROCEDURE — 87040 BLOOD CULTURE FOR BACTERIA: CPT | Performed by: EMERGENCY MEDICINE

## 2021-01-01 PROCEDURE — 25010000002 FUROSEMIDE PER 20 MG: Performed by: INTERNAL MEDICINE

## 2021-01-01 PROCEDURE — 82330 ASSAY OF CALCIUM: CPT | Performed by: FAMILY MEDICINE

## 2021-01-01 PROCEDURE — 80048 BASIC METABOLIC PNL TOTAL CA: CPT | Performed by: INTERNAL MEDICINE

## 2021-01-01 PROCEDURE — 82607 VITAMIN B-12: CPT | Performed by: INTERNAL MEDICINE

## 2021-01-01 PROCEDURE — 84466 ASSAY OF TRANSFERRIN: CPT | Performed by: INTERNAL MEDICINE

## 2021-01-01 PROCEDURE — 83690 ASSAY OF LIPASE: CPT | Performed by: EMERGENCY MEDICINE

## 2021-01-01 PROCEDURE — 99214 OFFICE O/P EST MOD 30 MIN: CPT | Performed by: STUDENT IN AN ORGANIZED HEALTH CARE EDUCATION/TRAINING PROGRAM

## 2021-01-01 PROCEDURE — 25010000002 DEXAMETHASONE SODIUM PHOSPHATE 10 MG/ML SOLUTION: Performed by: STUDENT IN AN ORGANIZED HEALTH CARE EDUCATION/TRAINING PROGRAM

## 2021-01-01 PROCEDURE — 25010000002 NA FERRIC GLUC CPLX PER 12.5 MG: Performed by: INTERNAL MEDICINE

## 2021-01-01 PROCEDURE — 25010000002 FENTANYL CITRATE (PF) 50 MCG/ML SOLUTION: Performed by: RADIOLOGY

## 2021-01-01 PROCEDURE — 25010000002 METHYLPREDNISOLONE PER 125 MG: Performed by: INTERNAL MEDICINE

## 2021-01-01 PROCEDURE — 25010000002 ENOXAPARIN PER 10 MG: Performed by: FAMILY MEDICINE

## 2021-01-01 PROCEDURE — 80076 HEPATIC FUNCTION PANEL: CPT | Performed by: FAMILY MEDICINE

## 2021-01-01 PROCEDURE — 82784 ASSAY IGA/IGD/IGG/IGM EACH: CPT | Performed by: INTERNAL MEDICINE

## 2021-01-01 PROCEDURE — P9612 CATHETERIZE FOR URINE SPEC: HCPCS

## 2021-01-01 PROCEDURE — 82962 GLUCOSE BLOOD TEST: CPT

## 2021-01-01 PROCEDURE — A9579 GAD-BASE MR CONTRAST NOS,1ML: HCPCS | Performed by: FAMILY MEDICINE

## 2021-01-01 PROCEDURE — 87086 URINE CULTURE/COLONY COUNT: CPT | Performed by: EMERGENCY MEDICINE

## 2021-01-01 PROCEDURE — 84100 ASSAY OF PHOSPHORUS: CPT | Performed by: INTERNAL MEDICINE

## 2021-01-01 PROCEDURE — 36600 WITHDRAWAL OF ARTERIAL BLOOD: CPT

## 2021-01-01 PROCEDURE — 85027 COMPLETE CBC AUTOMATED: CPT | Performed by: INTERNAL MEDICINE

## 2021-01-01 PROCEDURE — 86334 IMMUNOFIX E-PHORESIS SERUM: CPT | Performed by: INTERNAL MEDICINE

## 2021-01-01 PROCEDURE — 82550 ASSAY OF CK (CPK): CPT | Performed by: EMERGENCY MEDICINE

## 2021-01-01 PROCEDURE — 94640 AIRWAY INHALATION TREATMENT: CPT

## 2021-01-01 PROCEDURE — 83735 ASSAY OF MAGNESIUM: CPT | Performed by: FAMILY MEDICINE

## 2021-01-01 PROCEDURE — 83605 ASSAY OF LACTIC ACID: CPT

## 2021-01-01 PROCEDURE — 83735 ASSAY OF MAGNESIUM: CPT | Performed by: INTERNAL MEDICINE

## 2021-01-01 PROCEDURE — 93010 ELECTROCARDIOGRAM REPORT: CPT | Performed by: INTERNAL MEDICINE

## 2021-01-01 PROCEDURE — 86705 HEP B CORE ANTIBODY IGM: CPT | Performed by: INTERNAL MEDICINE

## 2021-01-01 PROCEDURE — 84443 ASSAY THYROID STIM HORMONE: CPT | Performed by: EMERGENCY MEDICINE

## 2021-01-01 PROCEDURE — 99222 1ST HOSP IP/OBS MODERATE 55: CPT | Performed by: RADIOLOGY

## 2021-01-01 PROCEDURE — 25010000002 LORAZEPAM PER 2 MG: Performed by: NURSE PRACTITIONER

## 2021-01-01 PROCEDURE — 84550 ASSAY OF BLOOD/URIC ACID: CPT | Performed by: INTERNAL MEDICINE

## 2021-01-01 PROCEDURE — 80053 COMPREHEN METABOLIC PANEL: CPT | Performed by: EMERGENCY MEDICINE

## 2021-01-01 PROCEDURE — 83735 ASSAY OF MAGNESIUM: CPT | Performed by: EMERGENCY MEDICINE

## 2021-01-01 PROCEDURE — 0 IOPAMIDOL 41 % SOLUTION: Performed by: STUDENT IN AN ORGANIZED HEALTH CARE EDUCATION/TRAINING PROGRAM

## 2021-01-01 PROCEDURE — 99204 OFFICE O/P NEW MOD 45 MIN: CPT | Performed by: INTERNAL MEDICINE

## 2021-01-01 PROCEDURE — 87635 SARS-COV-2 COVID-19 AMP PRB: CPT | Performed by: EMERGENCY MEDICINE

## 2021-01-01 PROCEDURE — 85025 COMPLETE CBC W/AUTO DIFF WBC: CPT | Performed by: INTERNAL MEDICINE

## 2021-01-01 PROCEDURE — 82746 ASSAY OF FOLIC ACID SERUM: CPT | Performed by: INTERNAL MEDICINE

## 2021-01-01 PROCEDURE — 99204 OFFICE O/P NEW MOD 45 MIN: CPT | Performed by: STUDENT IN AN ORGANIZED HEALTH CARE EDUCATION/TRAINING PROGRAM

## 2021-01-01 PROCEDURE — 82565 ASSAY OF CREATININE: CPT | Performed by: FAMILY MEDICINE

## 2021-01-01 PROCEDURE — 25010000002 MAGNESIUM SULFATE 2 GM/50ML SOLUTION: Performed by: FAMILY MEDICINE

## 2021-01-01 PROCEDURE — 86709 HEPATITIS A IGM ANTIBODY: CPT | Performed by: INTERNAL MEDICINE

## 2021-01-01 PROCEDURE — 25010000002 LORAZEPAM PER 2 MG: Performed by: FAMILY MEDICINE

## 2021-01-01 PROCEDURE — 71260 CT THORAX DX C+: CPT

## 2021-01-01 PROCEDURE — 25010000002 CEFEPIME PER 500 MG: Performed by: FAMILY MEDICINE

## 2021-01-01 PROCEDURE — 87641 MR-STAPH DNA AMP PROBE: CPT | Performed by: FAMILY MEDICINE

## 2021-01-01 PROCEDURE — 83935 ASSAY OF URINE OSMOLALITY: CPT | Performed by: INTERNAL MEDICINE

## 2021-01-01 PROCEDURE — 99222 1ST HOSP IP/OBS MODERATE 55: CPT | Performed by: NURSE PRACTITIONER

## 2021-01-01 PROCEDURE — 84484 ASSAY OF TROPONIN QUANT: CPT | Performed by: EMERGENCY MEDICINE

## 2021-01-01 PROCEDURE — 88341 IMHCHEM/IMCYTCHM EA ADD ANTB: CPT | Performed by: INTERNAL MEDICINE

## 2021-01-01 PROCEDURE — 74177 CT ABD & PELVIS W/CONTRAST: CPT

## 2021-01-01 PROCEDURE — 88342 IMHCHEM/IMCYTCHM 1ST ANTB: CPT | Performed by: INTERNAL MEDICINE

## 2021-01-01 PROCEDURE — 88333 PATH CONSLTJ SURG CYTO XM 1: CPT | Performed by: INTERNAL MEDICINE

## 2021-01-01 PROCEDURE — 83883 ASSAY NEPHELOMETRY NOT SPEC: CPT | Performed by: INTERNAL MEDICINE

## 2021-01-01 PROCEDURE — 80048 BASIC METABOLIC PNL TOTAL CA: CPT | Performed by: FAMILY MEDICINE

## 2021-01-01 PROCEDURE — 62321 NJX INTERLAMINAR CRV/THRC: CPT | Performed by: STUDENT IN AN ORGANIZED HEALTH CARE EDUCATION/TRAINING PROGRAM

## 2021-01-01 PROCEDURE — 85027 COMPLETE CBC AUTOMATED: CPT | Performed by: FAMILY MEDICINE

## 2021-01-01 PROCEDURE — 72156 MRI NECK SPINE W/O & W/DYE: CPT

## 2021-01-01 PROCEDURE — 86335 IMMUNFIX E-PHORSIS/URINE/CSF: CPT | Performed by: INTERNAL MEDICINE

## 2021-01-01 PROCEDURE — 84300 ASSAY OF URINE SODIUM: CPT | Performed by: FAMILY MEDICINE

## 2021-01-01 PROCEDURE — 99285 EMERGENCY DEPT VISIT HI MDM: CPT

## 2021-01-01 PROCEDURE — 25010000002 MORPHINE SULFATE (PF) 2 MG/ML SOLUTION: Performed by: FAMILY MEDICINE

## 2021-01-01 PROCEDURE — 93005 ELECTROCARDIOGRAM TRACING: CPT | Performed by: EMERGENCY MEDICINE

## 2021-01-01 PROCEDURE — 36415 COLL VENOUS BLD VENIPUNCTURE: CPT | Performed by: FAMILY MEDICINE

## 2021-01-01 PROCEDURE — 82728 ASSAY OF FERRITIN: CPT | Performed by: INTERNAL MEDICINE

## 2021-01-01 PROCEDURE — 93005 ELECTROCARDIOGRAM TRACING: CPT | Performed by: FAMILY MEDICINE

## 2021-01-01 PROCEDURE — 25010000002 POTASSIUM CHLORIDE PER 2 MEQ OF POTASSIUM: Performed by: INTERNAL MEDICINE

## 2021-01-01 PROCEDURE — 83540 ASSAY OF IRON: CPT | Performed by: INTERNAL MEDICINE

## 2021-01-01 RX ORDER — IPRATROPIUM BROMIDE AND ALBUTEROL SULFATE 2.5; .5 MG/3ML; MG/3ML
3 SOLUTION RESPIRATORY (INHALATION)
Status: DISCONTINUED | OUTPATIENT
Start: 2021-01-01 | End: 2021-01-01

## 2021-01-01 RX ORDER — LIDOCAINE HYDROCHLORIDE 10 MG/ML
INJECTION, SOLUTION INFILTRATION; PERINEURAL
Status: COMPLETED | OUTPATIENT
Start: 2021-01-01 | End: 2021-01-01

## 2021-01-01 RX ORDER — DOCUSATE SODIUM 100 MG/1
100 CAPSULE, LIQUID FILLED ORAL 2 TIMES DAILY
Status: DISCONTINUED | OUTPATIENT
Start: 2021-01-01 | End: 2021-01-01 | Stop reason: HOSPADM

## 2021-01-01 RX ORDER — HYDROCODONE BITARTRATE AND ACETAMINOPHEN 5; 325 MG/1; MG/1
1 TABLET ORAL DAILY PRN
Qty: 30 TABLET | Refills: 0 | Status: SHIPPED | OUTPATIENT
Start: 2021-01-01 | End: 2021-01-01 | Stop reason: SDUPTHER

## 2021-01-01 RX ORDER — POTASSIUM CHLORIDE 1.5 G/1.77G
40 POWDER, FOR SOLUTION ORAL AS NEEDED
Status: DISCONTINUED | OUTPATIENT
Start: 2021-01-01 | End: 2021-01-01 | Stop reason: HOSPADM

## 2021-01-01 RX ORDER — DEXAMETHASONE SODIUM PHOSPHATE 10 MG/ML
10 INJECTION, SOLUTION INTRAMUSCULAR; INTRAVENOUS ONCE
Status: COMPLETED | OUTPATIENT
Start: 2021-01-01 | End: 2021-01-01

## 2021-01-01 RX ORDER — ACETAMINOPHEN AND CODEINE PHOSPHATE 300; 30 MG/1; MG/1
1 TABLET ORAL 2 TIMES DAILY PRN
Qty: 60 TABLET | Refills: 0 | Status: SHIPPED | OUTPATIENT
Start: 2021-01-01 | End: 2021-01-01

## 2021-01-01 RX ORDER — POTASSIUM CHLORIDE 20 MEQ/1
40 TABLET, EXTENDED RELEASE ORAL AS NEEDED
Status: DISCONTINUED | OUTPATIENT
Start: 2021-01-01 | End: 2021-01-01 | Stop reason: HOSPADM

## 2021-01-01 RX ORDER — ACETYLCYSTEINE 200 MG/ML
2 SOLUTION ORAL; RESPIRATORY (INHALATION)
Status: DISCONTINUED | OUTPATIENT
Start: 2021-01-01 | End: 2021-01-01 | Stop reason: HOSPADM

## 2021-01-01 RX ORDER — SODIUM CHLORIDE 0.9 % (FLUSH) 0.9 %
10 SYRINGE (ML) INJECTION AS NEEDED
Status: DISCONTINUED | OUTPATIENT
Start: 2021-01-01 | End: 2021-01-01 | Stop reason: HOSPADM

## 2021-01-01 RX ORDER — BISACODYL 10 MG
10 SUPPOSITORY, RECTAL RECTAL DAILY PRN
Status: DISCONTINUED | OUTPATIENT
Start: 2021-01-01 | End: 2021-01-01 | Stop reason: HOSPADM

## 2021-01-01 RX ORDER — BUDESONIDE 0.5 MG/2ML
0.5 INHALANT ORAL
Status: DISCONTINUED | OUTPATIENT
Start: 2021-01-01 | End: 2021-01-01

## 2021-01-01 RX ORDER — FUROSEMIDE 10 MG/ML
20 INJECTION INTRAMUSCULAR; INTRAVENOUS ONCE
Status: DISCONTINUED | OUTPATIENT
Start: 2021-01-01 | End: 2021-01-01 | Stop reason: HOSPADM

## 2021-01-01 RX ORDER — ASPIRIN 81 MG/1
81 TABLET ORAL DAILY
Status: DISCONTINUED | OUTPATIENT
Start: 2021-01-01 | End: 2021-01-01 | Stop reason: HOSPADM

## 2021-01-01 RX ORDER — FENTANYL CITRATE 50 UG/ML
INJECTION, SOLUTION INTRAMUSCULAR; INTRAVENOUS
Status: COMPLETED | OUTPATIENT
Start: 2021-01-01 | End: 2021-01-01

## 2021-01-01 RX ORDER — MORPHINE SULFATE 2 MG/ML
2 INJECTION, SOLUTION INTRAMUSCULAR; INTRAVENOUS
Status: DISCONTINUED | OUTPATIENT
Start: 2021-01-01 | End: 2021-01-01 | Stop reason: HOSPADM

## 2021-01-01 RX ORDER — VANCOMYCIN 1.75 GRAM/500 ML IN 0.9 % SODIUM CHLORIDE INTRAVENOUS
20 ONCE
Status: COMPLETED | OUTPATIENT
Start: 2021-01-01 | End: 2021-01-01

## 2021-01-01 RX ORDER — MAGNESIUM SULFATE HEPTAHYDRATE 40 MG/ML
2 INJECTION, SOLUTION INTRAVENOUS AS NEEDED
Status: DISCONTINUED | OUTPATIENT
Start: 2021-01-01 | End: 2021-01-01 | Stop reason: HOSPADM

## 2021-01-01 RX ORDER — POTASSIUM CHLORIDE 7.45 MG/ML
10 INJECTION INTRAVENOUS
Status: DISCONTINUED | OUTPATIENT
Start: 2021-01-01 | End: 2021-01-01 | Stop reason: HOSPADM

## 2021-01-01 RX ORDER — FUROSEMIDE 10 MG/ML
40 INJECTION INTRAMUSCULAR; INTRAVENOUS ONCE
Status: COMPLETED | OUTPATIENT
Start: 2021-01-01 | End: 2021-01-01

## 2021-01-01 RX ORDER — BUMETANIDE 0.25 MG/ML
INJECTION INTRAMUSCULAR; INTRAVENOUS
Status: COMPLETED
Start: 2021-01-01 | End: 2021-01-01

## 2021-01-01 RX ORDER — METHYLPREDNISOLONE SODIUM SUCCINATE 125 MG/2ML
60 INJECTION, POWDER, LYOPHILIZED, FOR SOLUTION INTRAMUSCULAR; INTRAVENOUS EVERY 8 HOURS
Status: DISCONTINUED | OUTPATIENT
Start: 2021-01-01 | End: 2021-01-01 | Stop reason: HOSPADM

## 2021-01-01 RX ORDER — METOPROLOL TARTRATE 5 MG/5ML
5 INJECTION INTRAVENOUS ONCE
Status: COMPLETED | OUTPATIENT
Start: 2021-01-01 | End: 2021-01-01

## 2021-01-01 RX ORDER — LORAZEPAM 2 MG/ML
0.5 INJECTION INTRAMUSCULAR EVERY 4 HOURS PRN
Status: DISCONTINUED | OUTPATIENT
Start: 2021-01-01 | End: 2021-01-01

## 2021-01-01 RX ORDER — TIZANIDINE 4 MG/1
4 TABLET ORAL NIGHTLY PRN
Status: DISCONTINUED | OUTPATIENT
Start: 2021-01-01 | End: 2021-01-01 | Stop reason: HOSPADM

## 2021-01-01 RX ORDER — HYDROCODONE BITARTRATE AND ACETAMINOPHEN 5; 325 MG/1; MG/1
1 TABLET ORAL EVERY 4 HOURS PRN
Status: DISCONTINUED | OUTPATIENT
Start: 2021-01-01 | End: 2021-01-01 | Stop reason: HOSPADM

## 2021-01-01 RX ORDER — IPRATROPIUM BROMIDE AND ALBUTEROL SULFATE 2.5; .5 MG/3ML; MG/3ML
3 SOLUTION RESPIRATORY (INHALATION)
Status: DISCONTINUED | OUTPATIENT
Start: 2021-01-01 | End: 2021-01-01 | Stop reason: HOSPADM

## 2021-01-01 RX ORDER — ACETAMINOPHEN AND CODEINE PHOSPHATE 300; 30 MG/1; MG/1
1 TABLET ORAL 2 TIMES DAILY PRN
Qty: 14 TABLET | Refills: 0 | Status: SHIPPED | OUTPATIENT
Start: 2021-01-01 | End: 2021-01-01

## 2021-01-01 RX ORDER — FENTANYL 12 UG/H
1 PATCH TRANSDERMAL
Status: DISCONTINUED | OUTPATIENT
Start: 2021-01-01 | End: 2021-01-01 | Stop reason: HOSPADM

## 2021-01-01 RX ORDER — TIZANIDINE 4 MG/1
4 TABLET ORAL NIGHTLY PRN
Qty: 30 TABLET | Refills: 0 | Status: SHIPPED | OUTPATIENT
Start: 2021-01-01 | End: 2021-01-01 | Stop reason: SDUPTHER

## 2021-01-01 RX ORDER — FUROSEMIDE 40 MG/1
40 TABLET ORAL DAILY
Status: DISCONTINUED | OUTPATIENT
Start: 2021-01-01 | End: 2021-01-01 | Stop reason: HOSPADM

## 2021-01-01 RX ORDER — DILTIAZEM HYDROCHLORIDE 180 MG/1
180 CAPSULE, COATED, EXTENDED RELEASE ORAL
Status: DISCONTINUED | OUTPATIENT
Start: 2021-01-01 | End: 2021-01-01 | Stop reason: HOSPADM

## 2021-01-01 RX ORDER — POTASSIUM CHLORIDE 20 MEQ/1
40 TABLET, EXTENDED RELEASE ORAL ONCE
Status: COMPLETED | OUTPATIENT
Start: 2021-01-01 | End: 2021-01-01

## 2021-01-01 RX ORDER — BUMETANIDE 0.25 MG/ML
1 INJECTION INTRAMUSCULAR; INTRAVENOUS ONCE
Status: COMPLETED | OUTPATIENT
Start: 2021-01-01 | End: 2021-01-01

## 2021-01-01 RX ORDER — LORAZEPAM 2 MG/ML
1 INJECTION INTRAMUSCULAR
Status: DISCONTINUED | OUTPATIENT
Start: 2021-01-01 | End: 2021-01-01 | Stop reason: HOSPADM

## 2021-01-01 RX ORDER — ATORVASTATIN CALCIUM 20 MG/1
20 TABLET, FILM COATED ORAL NIGHTLY
Status: DISCONTINUED | OUTPATIENT
Start: 2021-01-01 | End: 2021-01-01

## 2021-01-01 RX ORDER — MAGNESIUM SULFATE HEPTAHYDRATE 40 MG/ML
4 INJECTION, SOLUTION INTRAVENOUS AS NEEDED
Status: DISCONTINUED | OUTPATIENT
Start: 2021-01-01 | End: 2021-01-01 | Stop reason: HOSPADM

## 2021-01-01 RX ORDER — MIDAZOLAM HYDROCHLORIDE 1 MG/ML
INJECTION INTRAMUSCULAR; INTRAVENOUS
Status: COMPLETED | OUTPATIENT
Start: 2021-01-01 | End: 2021-01-01

## 2021-01-01 RX ORDER — HYDROCHLOROTHIAZIDE 25 MG/1
TABLET ORAL
COMMUNITY
Start: 2021-01-01

## 2021-01-01 RX ORDER — BUDESONIDE 0.5 MG/2ML
1 INHALANT ORAL
Status: DISCONTINUED | OUTPATIENT
Start: 2021-01-01 | End: 2021-01-01 | Stop reason: HOSPADM

## 2021-01-01 RX ORDER — HYDROCODONE BITARTRATE AND ACETAMINOPHEN 5; 325 MG/1; MG/1
1 TABLET ORAL DAILY PRN
Qty: 7 TABLET | Refills: 0 | Status: SHIPPED | OUTPATIENT
Start: 2021-01-01 | End: 2021-01-01

## 2021-01-01 RX ORDER — TIZANIDINE 4 MG/1
4 TABLET ORAL NIGHTLY PRN
Qty: 30 TABLET | Refills: 0 | Status: SHIPPED | OUTPATIENT
Start: 2021-01-01 | End: 2021-10-27

## 2021-01-01 RX ORDER — HYDRALAZINE HYDROCHLORIDE 10 MG/1
TABLET, FILM COATED ORAL
COMMUNITY
Start: 2021-01-01

## 2021-01-01 RX ORDER — HYDROCORTISONE ACETATE 25 MG/1
25 SUPPOSITORY RECTAL
Qty: 12 | Refills: 1 | Status: COMPLETED
Start: 2021-01-01 | End: 2021-01-01

## 2021-01-01 RX ORDER — SODIUM CHLORIDE 1000 MG
2 TABLET, SOLUBLE MISCELLANEOUS
Status: DISCONTINUED | OUTPATIENT
Start: 2021-01-01 | End: 2021-01-01 | Stop reason: HOSPADM

## 2021-01-01 RX ORDER — ACETYLCYSTEINE 200 MG/ML
2 SOLUTION ORAL; RESPIRATORY (INHALATION)
Status: DISCONTINUED | OUTPATIENT
Start: 2021-01-01 | End: 2021-01-01

## 2021-01-01 RX ORDER — SODIUM CHLORIDE 9 MG/ML
20 INJECTION, SOLUTION INTRAVENOUS CONTINUOUS
Status: DISCONTINUED | OUTPATIENT
Start: 2021-01-01 | End: 2021-01-01

## 2021-01-01 RX ORDER — FUROSEMIDE 10 MG/ML
INJECTION INTRAMUSCULAR; INTRAVENOUS
Status: DISCONTINUED
Start: 2021-01-01 | End: 2021-01-01 | Stop reason: HOSPADM

## 2021-01-01 RX ORDER — MORPHINE SULFATE 2 MG/ML
2 INJECTION, SOLUTION INTRAMUSCULAR; INTRAVENOUS
Status: DISCONTINUED | OUTPATIENT
Start: 2021-01-01 | End: 2021-01-01

## 2021-01-01 RX ADMIN — BUDESONIDE 1 MG: 0.5 INHALANT RESPIRATORY (INHALATION) at 10:45

## 2021-01-01 RX ADMIN — BUDESONIDE 0.5 MG: 0.5 SUSPENSION RESPIRATORY (INHALATION) at 07:14

## 2021-01-01 RX ADMIN — MORPHINE SULFATE 2 MG: 2 INJECTION, SOLUTION INTRAMUSCULAR; INTRAVENOUS at 01:35

## 2021-01-01 RX ADMIN — POTASSIUM CHLORIDE: 2 INJECTION, SOLUTION, CONCENTRATE INTRAVENOUS at 14:18

## 2021-01-01 RX ADMIN — BUDESONIDE 0.5 MG: 0.5 SUSPENSION RESPIRATORY (INHALATION) at 19:19

## 2021-01-01 RX ADMIN — MORPHINE SULFATE 2 MG: 2 INJECTION, SOLUTION INTRAMUSCULAR; INTRAVENOUS at 01:19

## 2021-01-01 RX ADMIN — LORAZEPAM 1 MG: 2 INJECTION INTRAMUSCULAR; INTRAVENOUS at 07:05

## 2021-01-01 RX ADMIN — ASPIRIN 81 MG: 81 TABLET, COATED ORAL at 16:00

## 2021-01-01 RX ADMIN — BUDESONIDE 0.5 MG: 0.5 SUSPENSION RESPIRATORY (INHALATION) at 07:07

## 2021-01-01 RX ADMIN — IPRATROPIUM BROMIDE AND ALBUTEROL SULFATE 3 ML: 2.5; .5 SOLUTION RESPIRATORY (INHALATION) at 11:20

## 2021-01-01 RX ADMIN — MIDAZOLAM 1 MG: 1 INJECTION INTRAMUSCULAR; INTRAVENOUS at 09:44

## 2021-01-01 RX ADMIN — DOCUSATE SODIUM 100 MG: 100 CAPSULE, LIQUID FILLED ORAL at 20:44

## 2021-01-01 RX ADMIN — MORPHINE SULFATE 2 MG: 2 INJECTION, SOLUTION INTRAMUSCULAR; INTRAVENOUS at 02:52

## 2021-01-01 RX ADMIN — ASPIRIN 81 MG: 81 TABLET, COATED ORAL at 08:44

## 2021-01-01 RX ADMIN — ASPIRIN 81 MG: 81 TABLET, COATED ORAL at 09:12

## 2021-01-01 RX ADMIN — DEXAMETHASONE SODIUM PHOSPHATE 10 MG: 10 INJECTION, SOLUTION INTRAMUSCULAR; INTRAVENOUS at 13:18

## 2021-01-01 RX ADMIN — Medication 2 G: at 17:28

## 2021-01-01 RX ADMIN — MORPHINE SULFATE 2 MG: 2 INJECTION, SOLUTION INTRAMUSCULAR; INTRAVENOUS at 05:54

## 2021-01-01 RX ADMIN — POTASSIUM CHLORIDE 40 MEQ: 1500 TABLET, EXTENDED RELEASE ORAL at 00:00

## 2021-01-01 RX ADMIN — LIDOCAINE HYDROCHLORIDE 10 ML: 10 INJECTION, SOLUTION INFILTRATION; PERINEURAL at 09:49

## 2021-01-01 RX ADMIN — HYDROCODONE BITARTRATE AND ACETAMINOPHEN 1 TABLET: 5; 325 TABLET ORAL at 21:19

## 2021-01-01 RX ADMIN — IPRATROPIUM BROMIDE AND ALBUTEROL SULFATE 3 ML: 2.5; .5 SOLUTION RESPIRATORY (INHALATION) at 06:50

## 2021-01-01 RX ADMIN — METHYLPREDNISOLONE SODIUM SUCCINATE 60 MG: 125 INJECTION, POWDER, FOR SOLUTION INTRAMUSCULAR; INTRAVENOUS at 00:47

## 2021-01-01 RX ADMIN — HYDROCODONE BITARTRATE AND ACETAMINOPHEN 1 TABLET: 5; 325 TABLET ORAL at 09:12

## 2021-01-01 RX ADMIN — IPRATROPIUM BROMIDE AND ALBUTEROL SULFATE 3 ML: 2.5; .5 SOLUTION RESPIRATORY (INHALATION) at 07:10

## 2021-01-01 RX ADMIN — ATORVASTATIN CALCIUM 20 MG: 20 TABLET, FILM COATED ORAL at 20:48

## 2021-01-01 RX ADMIN — ATORVASTATIN CALCIUM 20 MG: 20 TABLET, FILM COATED ORAL at 20:47

## 2021-01-01 RX ADMIN — DILTIAZEM HYDROCHLORIDE 180 MG: 180 CAPSULE, COATED, EXTENDED RELEASE ORAL at 08:59

## 2021-01-01 RX ADMIN — HYDROCODONE BITARTRATE AND ACETAMINOPHEN 1 TABLET: 5; 325 TABLET ORAL at 16:11

## 2021-01-01 RX ADMIN — ENOXAPARIN SODIUM 40 MG: 40 INJECTION SUBCUTANEOUS at 15:05

## 2021-01-01 RX ADMIN — IPRATROPIUM BROMIDE AND ALBUTEROL SULFATE 3 ML: 2.5; .5 SOLUTION RESPIRATORY (INHALATION) at 10:42

## 2021-01-01 RX ADMIN — FUROSEMIDE 40 MG: 40 TABLET ORAL at 15:05

## 2021-01-01 RX ADMIN — DILTIAZEM HYDROCHLORIDE 180 MG: 180 CAPSULE, COATED, EXTENDED RELEASE ORAL at 08:44

## 2021-01-01 RX ADMIN — LORAZEPAM 1 MG: 2 INJECTION INTRAMUSCULAR; INTRAVENOUS at 02:49

## 2021-01-01 RX ADMIN — DOCUSATE SODIUM 100 MG: 100 CAPSULE, LIQUID FILLED ORAL at 20:37

## 2021-01-01 RX ADMIN — WATER 1 G: 100 INJECTION, SOLUTION INTRAVENOUS at 16:16

## 2021-01-01 RX ADMIN — SODIUM CHLORIDE 125 MG: 9 INJECTION, SOLUTION INTRAVENOUS at 12:22

## 2021-01-01 RX ADMIN — IPRATROPIUM BROMIDE AND ALBUTEROL SULFATE 3 ML: 2.5; .5 SOLUTION RESPIRATORY (INHALATION) at 20:13

## 2021-01-01 RX ADMIN — IPRATROPIUM BROMIDE AND ALBUTEROL SULFATE 3 ML: .5; 3 SOLUTION RESPIRATORY (INHALATION) at 23:25

## 2021-01-01 RX ADMIN — TOLVAPTAN 15 MG: 30 TABLET ORAL at 12:06

## 2021-01-01 RX ADMIN — IPRATROPIUM BROMIDE AND ALBUTEROL SULFATE 3 ML: 2.5; .5 SOLUTION RESPIRATORY (INHALATION) at 08:08

## 2021-01-01 RX ADMIN — ACETYLCYSTEINE 2 ML: 200 SOLUTION ORAL; RESPIRATORY (INHALATION) at 11:21

## 2021-01-01 RX ADMIN — Medication 600 MG: at 09:15

## 2021-01-01 RX ADMIN — HYDROCODONE BITARTRATE AND ACETAMINOPHEN 1 TABLET: 5; 325 TABLET ORAL at 08:19

## 2021-01-01 RX ADMIN — HYDROCODONE BITARTRATE AND ACETAMINOPHEN 1 TABLET: 5; 325 TABLET ORAL at 12:56

## 2021-01-01 RX ADMIN — ASPIRIN 81 MG: 81 TABLET, COATED ORAL at 09:07

## 2021-01-01 RX ADMIN — HYDROCODONE BITARTRATE AND ACETAMINOPHEN 1 TABLET: 5; 325 TABLET ORAL at 05:38

## 2021-01-01 RX ADMIN — FENTANYL CITRATE 100 MCG: 50 INJECTION, SOLUTION INTRAMUSCULAR; INTRAVENOUS at 09:44

## 2021-01-01 RX ADMIN — IPRATROPIUM BROMIDE AND ALBUTEROL SULFATE 3 ML: .5; 3 SOLUTION RESPIRATORY (INHALATION) at 11:43

## 2021-01-01 RX ADMIN — IPRATROPIUM BROMIDE AND ALBUTEROL SULFATE 3 ML: 2.5; .5 SOLUTION RESPIRATORY (INHALATION) at 11:22

## 2021-01-01 RX ADMIN — Medication 10 ML: at 09:00

## 2021-01-01 RX ADMIN — SODIUM CHLORIDE 125 MG: 9 INJECTION, SOLUTION INTRAVENOUS at 09:00

## 2021-01-01 RX ADMIN — HYDROCODONE BITARTRATE AND ACETAMINOPHEN 1 TABLET: 5; 325 TABLET ORAL at 16:15

## 2021-01-01 RX ADMIN — HYDROCODONE BITARTRATE AND ACETAMINOPHEN 1 TABLET: 5; 325 TABLET ORAL at 16:59

## 2021-01-01 RX ADMIN — HYDROCODONE BITARTRATE AND ACETAMINOPHEN 1 TABLET: 5; 325 TABLET ORAL at 02:30

## 2021-01-01 RX ADMIN — IOPAMIDOL 1 ML: 408 INJECTION, SOLUTION INTRATHECAL at 13:18

## 2021-01-01 RX ADMIN — HYDROCODONE BITARTRATE AND ACETAMINOPHEN 1 TABLET: 5; 325 TABLET ORAL at 12:27

## 2021-01-01 RX ADMIN — HYDROCODONE BITARTRATE AND ACETAMINOPHEN 1 TABLET: 5; 325 TABLET ORAL at 16:43

## 2021-01-01 RX ADMIN — LORAZEPAM 0.5 MG: 2 INJECTION INTRAMUSCULAR; INTRAVENOUS at 01:19

## 2021-01-01 RX ADMIN — ATORVASTATIN CALCIUM 20 MG: 20 TABLET, FILM COATED ORAL at 20:49

## 2021-01-01 RX ADMIN — DOCUSATE SODIUM 100 MG: 100 CAPSULE, LIQUID FILLED ORAL at 08:19

## 2021-01-01 RX ADMIN — BUMETANIDE 1 MG: 0.25 INJECTION, SOLUTION INTRAMUSCULAR; INTRAVENOUS at 16:42

## 2021-01-01 RX ADMIN — METHYLPREDNISOLONE SODIUM SUCCINATE 60 MG: 125 INJECTION, POWDER, FOR SOLUTION INTRAMUSCULAR; INTRAVENOUS at 02:46

## 2021-01-01 RX ADMIN — Medication 2 G: at 08:19

## 2021-01-01 RX ADMIN — MAGNESIUM SULFATE HEPTAHYDRATE 2 G: 2 INJECTION, SOLUTION INTRAVENOUS at 01:31

## 2021-01-01 RX ADMIN — ACETYLCYSTEINE 2 ML: 200 SOLUTION ORAL; RESPIRATORY (INHALATION) at 20:12

## 2021-01-01 RX ADMIN — METHYLPREDNISOLONE SODIUM SUCCINATE 60 MG: 125 INJECTION, POWDER, FOR SOLUTION INTRAMUSCULAR; INTRAVENOUS at 16:42

## 2021-01-01 RX ADMIN — BUDESONIDE 0.5 MG: 0.5 SUSPENSION RESPIRATORY (INHALATION) at 06:55

## 2021-01-01 RX ADMIN — DILTIAZEM HYDROCHLORIDE 180 MG: 180 CAPSULE, COATED, EXTENDED RELEASE ORAL at 16:01

## 2021-01-01 RX ADMIN — HYDROCODONE BITARTRATE AND ACETAMINOPHEN 1 TABLET: 5; 325 TABLET ORAL at 00:54

## 2021-01-01 RX ADMIN — HYDROCODONE BITARTRATE AND ACETAMINOPHEN 1 TABLET: 5; 325 TABLET ORAL at 14:17

## 2021-01-01 RX ADMIN — DOCUSATE SODIUM 100 MG: 100 CAPSULE, LIQUID FILLED ORAL at 20:38

## 2021-01-01 RX ADMIN — HYDROCODONE BITARTRATE AND ACETAMINOPHEN 1 TABLET: 5; 325 TABLET ORAL at 00:04

## 2021-01-01 RX ADMIN — BUDESONIDE 0.5 MG: 0.5 SUSPENSION RESPIRATORY (INHALATION) at 07:26

## 2021-01-01 RX ADMIN — IOPAMIDOL 100 ML: 755 INJECTION, SOLUTION INTRAVENOUS at 22:53

## 2021-01-01 RX ADMIN — HYDROCODONE BITARTRATE AND ACETAMINOPHEN 1 TABLET: 5; 325 TABLET ORAL at 00:55

## 2021-01-01 RX ADMIN — POTASSIUM PHOSPHATE, MONOBASIC 1000 MG: 500 TABLET, SOLUBLE ORAL at 12:03

## 2021-01-01 RX ADMIN — IPRATROPIUM BROMIDE AND ALBUTEROL SULFATE 3 ML: 2.5; .5 SOLUTION RESPIRATORY (INHALATION) at 23:37

## 2021-01-01 RX ADMIN — SODIUM CHLORIDE, POTASSIUM CHLORIDE, SODIUM LACTATE AND CALCIUM CHLORIDE 2586 ML: 600; 310; 30; 20 INJECTION, SOLUTION INTRAVENOUS at 19:52

## 2021-01-01 RX ADMIN — BUDESONIDE 0.5 MG: 0.5 SUSPENSION RESPIRATORY (INHALATION) at 20:19

## 2021-01-01 RX ADMIN — IPRATROPIUM BROMIDE AND ALBUTEROL SULFATE 3 ML: 2.5; .5 SOLUTION RESPIRATORY (INHALATION) at 20:08

## 2021-01-01 RX ADMIN — HYDROCODONE BITARTRATE AND ACETAMINOPHEN 1 TABLET: 5; 325 TABLET ORAL at 20:42

## 2021-01-01 RX ADMIN — CEFEPIME HYDROCHLORIDE 2 G: 2 INJECTION, POWDER, FOR SOLUTION INTRAVENOUS at 23:53

## 2021-01-01 RX ADMIN — IPRATROPIUM BROMIDE AND ALBUTEROL SULFATE 3 ML: .5; 3 SOLUTION RESPIRATORY (INHALATION) at 16:18

## 2021-01-01 RX ADMIN — Medication 10 ML: at 09:15

## 2021-01-01 RX ADMIN — METHYLPREDNISOLONE SODIUM SUCCINATE 60 MG: 125 INJECTION, POWDER, FOR SOLUTION INTRAMUSCULAR; INTRAVENOUS at 17:29

## 2021-01-01 RX ADMIN — DILTIAZEM HYDROCHLORIDE 180 MG: 180 CAPSULE, COATED, EXTENDED RELEASE ORAL at 09:07

## 2021-01-01 RX ADMIN — MAGNESIUM HYDROXIDE 10 ML: 2400 SUSPENSION ORAL at 12:03

## 2021-01-01 RX ADMIN — ASPIRIN 81 MG: 81 TABLET, COATED ORAL at 11:22

## 2021-01-01 RX ADMIN — IPRATROPIUM BROMIDE AND ALBUTEROL SULFATE 3 ML: 2.5; .5 SOLUTION RESPIRATORY (INHALATION) at 07:15

## 2021-01-01 RX ADMIN — DILTIAZEM HYDROCHLORIDE 180 MG: 180 CAPSULE, COATED, EXTENDED RELEASE ORAL at 08:19

## 2021-01-01 RX ADMIN — Medication 10 ML: at 08:18

## 2021-01-01 RX ADMIN — Medication 10 ML: at 20:39

## 2021-01-01 RX ADMIN — BUDESONIDE 0.5 MG: 0.5 SUSPENSION RESPIRATORY (INHALATION) at 23:37

## 2021-01-01 RX ADMIN — BUMETANIDE 1 MG: 0.25 INJECTION INTRAMUSCULAR; INTRAVENOUS at 16:42

## 2021-01-01 RX ADMIN — DOCUSATE SODIUM 100 MG: 100 CAPSULE, LIQUID FILLED ORAL at 12:03

## 2021-01-01 RX ADMIN — FENTANYL 1 PATCH: 12 PATCH, EXTENDED RELEASE TRANSDERMAL at 12:03

## 2021-01-01 RX ADMIN — Medication 2 G: at 17:29

## 2021-01-01 RX ADMIN — HYDROCODONE BITARTRATE AND ACETAMINOPHEN 1 TABLET: 5; 325 TABLET ORAL at 20:49

## 2021-01-01 RX ADMIN — IPRATROPIUM BROMIDE AND ALBUTEROL SULFATE 3 ML: 2.5; .5 SOLUTION RESPIRATORY (INHALATION) at 14:33

## 2021-01-01 RX ADMIN — MAGNESIUM SULFATE HEPTAHYDRATE 2 G: 2 INJECTION, SOLUTION INTRAVENOUS at 03:40

## 2021-01-01 RX ADMIN — ACETYLCYSTEINE 2 ML: 200 SOLUTION ORAL; RESPIRATORY (INHALATION) at 18:47

## 2021-01-01 RX ADMIN — ASPIRIN 81 MG: 81 TABLET, COATED ORAL at 08:59

## 2021-01-01 RX ADMIN — ENOXAPARIN SODIUM 40 MG: 40 INJECTION SUBCUTANEOUS at 16:11

## 2021-01-01 RX ADMIN — ENOXAPARIN SODIUM 40 MG: 40 INJECTION SUBCUTANEOUS at 16:01

## 2021-01-01 RX ADMIN — Medication 600 MG: at 20:43

## 2021-01-01 RX ADMIN — IPRATROPIUM BROMIDE AND ALBUTEROL SULFATE 3 ML: 2.5; .5 SOLUTION RESPIRATORY (INHALATION) at 15:32

## 2021-01-01 RX ADMIN — IPRATROPIUM BROMIDE AND ALBUTEROL SULFATE 3 ML: .5; 3 SOLUTION RESPIRATORY (INHALATION) at 10:46

## 2021-01-01 RX ADMIN — HYDROCODONE BITARTRATE AND ACETAMINOPHEN 1 TABLET: 5; 325 TABLET ORAL at 12:03

## 2021-01-01 RX ADMIN — DOCUSATE SODIUM 100 MG: 100 CAPSULE, LIQUID FILLED ORAL at 08:43

## 2021-01-01 RX ADMIN — LORAZEPAM 1 MG: 2 INJECTION INTRAMUSCULAR; INTRAVENOUS at 01:36

## 2021-01-01 RX ADMIN — BUDESONIDE 0.5 MG: 0.5 SUSPENSION RESPIRATORY (INHALATION) at 08:14

## 2021-01-01 RX ADMIN — IPRATROPIUM BROMIDE AND ALBUTEROL SULFATE 3 ML: 2.5; .5 SOLUTION RESPIRATORY (INHALATION) at 19:19

## 2021-01-01 RX ADMIN — METHYLPREDNISOLONE SODIUM SUCCINATE 60 MG: 125 INJECTION, POWDER, FOR SOLUTION INTRAMUSCULAR; INTRAVENOUS at 08:19

## 2021-01-01 RX ADMIN — IPRATROPIUM BROMIDE AND ALBUTEROL SULFATE 3 ML: 2.5; .5 SOLUTION RESPIRATORY (INHALATION) at 11:43

## 2021-01-01 RX ADMIN — GLYCOPYRROLATE 0.1 MG: 0.2 INJECTION, SOLUTION INTRAMUSCULAR; INTRAVITREAL at 01:36

## 2021-01-01 RX ADMIN — ASPIRIN 81 MG: 81 TABLET, COATED ORAL at 08:19

## 2021-01-01 RX ADMIN — DILTIAZEM HYDROCHLORIDE 180 MG: 180 CAPSULE, COATED, EXTENDED RELEASE ORAL at 09:12

## 2021-01-01 RX ADMIN — WATER 1 G: 100 INJECTION, SOLUTION INTRAVENOUS at 16:11

## 2021-01-01 RX ADMIN — IPRATROPIUM BROMIDE AND ALBUTEROL SULFATE 3 ML: 2.5; .5 SOLUTION RESPIRATORY (INHALATION) at 15:08

## 2021-01-01 RX ADMIN — GADOTERIDOL 17 ML: 279.3 INJECTION, SOLUTION INTRAVENOUS at 14:23

## 2021-01-01 RX ADMIN — Medication 600 MG: at 08:19

## 2021-01-01 RX ADMIN — ATORVASTATIN CALCIUM 20 MG: 20 TABLET, FILM COATED ORAL at 20:42

## 2021-01-01 RX ADMIN — VANCOMYCIN HYDROCHLORIDE 1750 MG: 500 INJECTION, POWDER, LYOPHILIZED, FOR SOLUTION INTRAVENOUS at 00:00

## 2021-01-01 RX ADMIN — SODIUM CHLORIDE 250 ML: 9 INJECTION, SOLUTION INTRAVENOUS at 21:45

## 2021-01-01 RX ADMIN — BUDESONIDE 0.5 MG: 0.5 INHALANT RESPIRATORY (INHALATION) at 18:52

## 2021-01-01 RX ADMIN — BUDESONIDE 0.5 MG: 0.5 SUSPENSION RESPIRATORY (INHALATION) at 20:07

## 2021-01-01 RX ADMIN — Medication 600 MG: at 20:38

## 2021-01-01 RX ADMIN — ACETYLCYSTEINE 4 ML: 200 SOLUTION ORAL; RESPIRATORY (INHALATION) at 07:14

## 2021-01-01 RX ADMIN — ENOXAPARIN SODIUM 40 MG: 40 INJECTION SUBCUTANEOUS at 15:42

## 2021-01-01 RX ADMIN — ENOXAPARIN SODIUM 40 MG: 40 INJECTION SUBCUTANEOUS at 16:59

## 2021-01-01 RX ADMIN — CEFEPIME HYDROCHLORIDE 2 G: 2 INJECTION, POWDER, FOR SOLUTION INTRAVENOUS at 11:22

## 2021-01-01 RX ADMIN — Medication 10 ML: at 09:07

## 2021-01-01 RX ADMIN — METOPROLOL TARTRATE 5 MG: 1 INJECTION, SOLUTION INTRAVENOUS at 22:50

## 2021-01-01 RX ADMIN — BUDESONIDE 0.5 MG: 0.5 SUSPENSION RESPIRATORY (INHALATION) at 07:10

## 2021-01-01 RX ADMIN — IPRATROPIUM BROMIDE AND ALBUTEROL SULFATE 3 ML: 2.5; .5 SOLUTION RESPIRATORY (INHALATION) at 20:02

## 2021-01-01 RX ADMIN — HYDROCODONE BITARTRATE AND ACETAMINOPHEN 1 TABLET: 5; 325 TABLET ORAL at 05:54

## 2021-01-01 RX ADMIN — CEFEPIME HYDROCHLORIDE 2 G: 2 INJECTION, POWDER, FOR SOLUTION INTRAVENOUS at 20:02

## 2021-01-01 RX ADMIN — WATER 1 G: 100 INJECTION, SOLUTION INTRAVENOUS at 16:23

## 2021-01-01 RX ADMIN — ENOXAPARIN SODIUM 40 MG: 40 INJECTION SUBCUTANEOUS at 16:43

## 2021-01-01 RX ADMIN — BUDESONIDE 0.5 MG: 0.5 SUSPENSION RESPIRATORY (INHALATION) at 20:08

## 2021-01-01 RX ADMIN — DILTIAZEM HYDROCHLORIDE 180 MG: 180 CAPSULE, COATED, EXTENDED RELEASE ORAL at 13:31

## 2021-01-01 RX ADMIN — HYDROCODONE BITARTRATE AND ACETAMINOPHEN 1 TABLET: 5; 325 TABLET ORAL at 08:43

## 2021-01-01 RX ADMIN — HYDROCODONE BITARTRATE AND ACETAMINOPHEN 1 TABLET: 5; 325 TABLET ORAL at 17:29

## 2021-01-01 RX ADMIN — IPRATROPIUM BROMIDE AND ALBUTEROL SULFATE 3 ML: 2.5; .5 SOLUTION RESPIRATORY (INHALATION) at 07:07

## 2021-01-01 RX ADMIN — TOLVAPTAN 15 MG: 30 TABLET ORAL at 13:31

## 2021-01-01 RX ADMIN — Medication 2 G: at 12:27

## 2021-01-01 RX ADMIN — HYDROCODONE BITARTRATE AND ACETAMINOPHEN 1 TABLET: 5; 325 TABLET ORAL at 01:31

## 2021-01-01 RX ADMIN — LORAZEPAM 1 MG: 2 INJECTION INTRAMUSCULAR; INTRAVENOUS at 05:50

## 2021-01-01 RX ADMIN — FUROSEMIDE 40 MG: 10 INJECTION, SOLUTION INTRAMUSCULAR; INTRAVENOUS at 12:27

## 2021-01-01 RX ADMIN — CEFEPIME HYDROCHLORIDE 2 G: 2 INJECTION, POWDER, FOR SOLUTION INTRAVENOUS at 11:36

## 2021-01-01 RX ADMIN — METHYLPREDNISOLONE SODIUM SUCCINATE 60 MG: 125 INJECTION, POWDER, FOR SOLUTION INTRAMUSCULAR; INTRAVENOUS at 09:15

## 2021-01-01 RX ADMIN — IPRATROPIUM BROMIDE AND ALBUTEROL SULFATE 3 ML: 2.5; .5 SOLUTION RESPIRATORY (INHALATION) at 15:21

## 2021-01-01 RX ADMIN — IPRATROPIUM BROMIDE AND ALBUTEROL SULFATE 3 ML: 2.5; .5 SOLUTION RESPIRATORY (INHALATION) at 20:16

## 2021-01-01 RX ADMIN — IPRATROPIUM BROMIDE AND ALBUTEROL SULFATE 3 ML: .5; 3 SOLUTION RESPIRATORY (INHALATION) at 18:42

## 2021-01-01 RX ADMIN — HYDROCODONE BITARTRATE AND ACETAMINOPHEN 1 TABLET: 5; 325 TABLET ORAL at 15:42

## 2021-01-01 RX ADMIN — BUDESONIDE 0.5 MG: 0.5 SUSPENSION RESPIRATORY (INHALATION) at 07:15

## 2021-01-01 RX ADMIN — IPRATROPIUM BROMIDE AND ALBUTEROL SULFATE 3 ML: 2.5; .5 SOLUTION RESPIRATORY (INHALATION) at 07:14

## 2021-01-01 RX ADMIN — IPRATROPIUM BROMIDE AND ALBUTEROL SULFATE 3 ML: 2.5; .5 SOLUTION RESPIRATORY (INHALATION) at 11:10

## 2021-01-01 RX ADMIN — IPRATROPIUM BROMIDE AND ALBUTEROL SULFATE 3 ML: 2.5; .5 SOLUTION RESPIRATORY (INHALATION) at 07:26

## 2021-01-01 RX ADMIN — HYDROCODONE BITARTRATE AND ACETAMINOPHEN 1 TABLET: 5; 325 TABLET ORAL at 12:22

## 2021-01-01 RX ADMIN — MORPHINE SULFATE 2 MG: 2 INJECTION, SOLUTION INTRAMUSCULAR; INTRAVENOUS at 07:07

## 2021-01-01 RX ADMIN — ENOXAPARIN SODIUM 40 MG: 40 INJECTION SUBCUTANEOUS at 16:15

## 2021-01-01 RX ADMIN — HYDROCODONE BITARTRATE AND ACETAMINOPHEN 1 TABLET: 5; 325 TABLET ORAL at 20:47

## 2021-08-09 NOTE — PROGRESS NOTES
CHIEF COMPLAINT  Neck Pain      Subjective   Prabhjot Roldan is a 86 y.o. male who was referred by Dr. Baltazar Centeno MD to our pain management clinic for consultation, evaluation and treatment of neck pain and epidural at C4-5. Neck pain started around Oct 2020 without any injuries.     Neck pain is 3/10 on VAS, at maximum is 8/10. Pain is aching, sharp, stabbing and throbbing in nature. Pain is referred from behind ear to right shoulder. The pain is constant. The pain is improved by aspirin. The pain is worse with lying down, lifting, exercise.    PHQ-9- 2  SOAPP- 0    PMH:   HTN, Hx of rotator cuff on right side    Current Medications:   Aspirin      Past Medications:  Baclofen 5 mg 3 times daily as needed  Tramadol 50 mg  meloxicam     Past Modalities:  TENS:       no          Physical Therapy Within The Last 6 Months     Yes  + water aquatics   Psychotherapy     no  Massage Therapy      no    Patient Complains Of:  Uro-Fecal Incontinence no  Weight Gain/Loss  no  Fever/Chills   no  Weakness   no      PEG Assessment   What number best describes your pain on average in the past week?4  What number best describes how, during the past week, pain has interfered with your enjoyment of life?4  What number best describes how, during the past week, pain has interfered with your general activity?  5        Current Outpatient Medications:   •  amLODIPine (NORVASC) 10 MG tablet, , Disp: , Rfl:   •  aspirin (Adult Aspirin EC Low Strength) 81 MG EC tablet, ADULT ASPIRIN EC LOW STRENGTH 81 MG ORAL TABLET DELAYED RELEASE, Disp: , Rfl:   •  dilTIAZem CD (CARDIZEM CD) 240 MG 24 hr capsule, , Disp: , Rfl:   •  hydrALAZINE (APRESOLINE) 10 MG tablet, , Disp: , Rfl:   •  losartan-hydrochlorothiazide (HYZAAR) 100-12.5 MG per tablet, LOSARTAN POTASSIUM-HCTZ 100-12.5 MG TABS, Disp: , Rfl:   •  pravastatin (PRAVACHOL) 80 MG tablet, , Disp: , Rfl:   •  acetaminophen-codeine (TYLENOL #3) 300-30 MG per tablet, Take 1 tablet by mouth 2 (Two)  "Times a Day As Needed for Moderate Pain  for up to 7 days., Disp: 14 tablet, Rfl: 0  •  [START ON 8/15/2021] acetaminophen-codeine (TYLENOL #3) 300-30 MG per tablet, Take 1 tablet by mouth 2 (Two) Times a Day As Needed for Moderate Pain  for up to 30 days., Disp: 60 tablet, Rfl: 0    The following portions of the patient's history were reviewed and updated as appropriate: allergies, current medications, past family history, past medical history, past social history, past surgical history, and problem list.      REVIEW OF PERTINENT MEDICAL DATA    Past Medical History:   Diagnosis Date   • Arthritis    • Hyperlipidemia    • Hypertension    • Neck pain      Past Surgical History:   Procedure Laterality Date   • HERNIA REPAIR       Family History   Problem Relation Age of Onset   • Cancer Mother    • Diabetes Mother    • Cancer Father    • Prostate cancer Father    • Heart attack Father      Social History     Socioeconomic History   • Marital status:      Spouse name: Not on file   • Number of children: Not on file   • Years of education: Not on file   • Highest education level: Not on file   Tobacco Use   • Smoking status: Never Smoker   • Smokeless tobacco: Never Used   Vaping Use   • Vaping Use: Never used   Substance and Sexual Activity   • Alcohol use: Never         Review of Systems   Musculoskeletal: Positive for arthralgias and neck pain.   Neurological: Positive for dizziness and headaches.         Vitals:    08/09/21 1401   BP: 155/80   Pulse: 85   Resp: 16   SpO2: 95%   Weight: 86.2 kg (190 lb)   Height: 175.3 cm (69\")   PainSc:   4         Objective   Physical Exam  HENT:      Head:     Musculoskeletal:         General: Tenderness present.        Arms:    Neurological:      Deep Tendon Reflexes:      Reflex Scores:       Tricep reflexes are 2+ on the right side and 2+ on the left side.       Bicep reflexes are 2+ on the right side and 2+ on the left side.       Brachioradialis reflexes are 2+ on the " right side and 2+ on the left side.     Comments: Motor strength 5/5 b/l UE  Sensory intact b/l UE             Imaging Reviewed:  MRI cervical spine-4/14/2021  C2-C3-facet degenerative changes (right.  C3-C4-facet degenerative changes and uncovertebral joint degenerative changes.  Moderate to severe bilateral neuroforaminal narrowing.  C4-C5-mild to moderate central canal narrowing.  Facet degenerative changes.  Severe bilateral neuroforaminal narrowing.  C5-C6-large marginal osteophyte on left uncovertebral joint degenerative changes.  Facet degenerative changes.  Severe left and moderate to severe right neuroforaminal narrowing.  Minimal central canal narrowing.  C6-7-mild facet joint change.  Mild to moderate bilateral neuroforaminal narrowing.  N0-W5-iydmbsoiv marginal osteophytes and uncovertebral degenerative changes on the right.  Severe right and mild left neuroforaminal narrowing.      Assessment:    1. DDD (degenerative disc disease), cervical    2. Cervical spondylosis         Plan:   1. New patient visit, urine drug screen per office policy. UDS today to monitor for compliance with medication use. The UDS is medically necessary to monitor for compliance due to the potential side effects and complications of misuse of opioids. UDS done today will review on next visit.   Narcotic contract signed - 8/9/2021.   2. We discussed trying a course of formal physical therapy.  Physical therapy can help strengthen and stretch the muscles around the joints. Continue to be as active as possible. Start physical therapy as it will help generalized pain and follow up with HEP.   3. Patient has pain in the neck with radicular pain in the arm, patient has failed conservative therapy including PT and pharmacological management for more than 6 weeks and pain interferes with activities of daily living. Spurling’s test is positive. MRI shows severe b/l neuroforaminal narrowing at C4-5 discussed cervical SYLVIE C4-5 with catheter  from C7-T1. Discussed the possibility of infection, bleeding, nerve damage, post dural puncture headache, increased pain, paraplegia. Patient understands and agrees.   4. Due to severe pain, will start Tylenol #3 BID PRN (7 days followed by 30 days- 8/15). Discussed with the patient regarding long-term side effects of opioids including but not limited to opioid induced hormonal suppression, hyperalgesia, fatigue, weight gain, possible opioid induced altered immune system, addiction, tolerance, dependence, risk of hearing loss and elevated risk of myocardial infarction. Proper use and potential life threatening side effects of over use discussed with patient. Patient states understanding of their use and risks.      RTC for injection and then 3 week follow up.     Chris Mccarthy DO  Pain Management   [facility]         INSPECT REPORT    As part of the patient's treatment plan, I may be prescribing controlled substances. The patient has been made aware of appropriate use of such medications, including potential risk of somnolence, limited ability to drive and/or work safely, and the potential for dependence or overdose. It has also been made clear that these medications are for use by this patient only, without concomitant use of alcohol or other substances unless prescribed.     Patient has completed prescribing agreement detailing terms of continued prescribing of controlled substances, including monitoring INSPECT reports, urine drug screening, and pill counts if necessary. The patient is aware that inappropriate use will results in cessation of prescribing such medications.    INSPECT report has been reviewed and scanned into the patient's chart.      EMR Dragon/Transcription Disclaimer:   Much of this encounter note is an electronic transcription/translation of spoken language to printed text. The electronic translation of spoken language may permit erroneous, or at times, nonsensical words or phrases to be  inadvertently transcribed; Although I have reviewed the note for such errors, some may still exist.

## 2021-08-09 NOTE — PATIENT INSTRUCTIONS
Start taking Tylenol #3 up to twice daily as needed. There is 1 prescription sent for 7 days and then there is another prescription for 30 days afterward.     You will be getting cervical epidural injection in future.

## 2021-08-10 NOTE — TELEPHONE ENCOUNTER
8/10/21- KALLI CALLED FOR  ANAI-- PT TOLD HER  TYLENOL #3 RXED YESTERDAY IS NOT HELPING AT ALL--- WANTED TO KNOW WHAT HE CAN DO -- SHOULD HE BE TAKING ADVIL INBETWEEN THE TYLENOL #3--- PLEASE ADVISE WHAT TO TO  FOR PT-- CALL BACK #[phone]

## 2021-08-19 NOTE — TELEPHONE ENCOUNTER
Daughter wants to know if there is any other pain medication Prabhjot could take? Tylenol #3 is causing constipation. If he can not have another pain medication would it be ok for him just to rotate between Aleve and Tylenol or another OTC medication instead of using the Tylenol #3?

## 2021-09-07 NOTE — PROCEDURES
PREOPERATIVE DIAGNOSIS:    1. Cervical DDD    POSTOPERATIVE DIAGNOSIS:  Same.    PROCEDURE PERFORMED: Cervical SYLVIE C 7-T1 with catheter to C4-5 on right.      PROCEDURE IN DETAIL:    Written informed consent was taken from the patient. Pre-procedure blood pressure and pulse were stable and recorded in patients clinic chart. The patient was brought to the procedure room and placed in the prone position on fluoroscopy table. The patient’s neck was prepped with chloraprep and draped in the usual sterile fashion.   The skin over the C 7-T1 space was identified under fluoroscopic guidance and infiltrated with 1% lidocaine for local anesthesia via 25 gauge needle.  A 17 gauge Tuohy needle was inserted through the skin under fluoroscopic guidance until we got to the epidural space with loss of resistance technique.  Negative for CSF and heme. 19 G catheter was placed through epidural needle and drove to C4-5 on right side.  2 ml of omnipaque contrast was injected under continuous fluoroscopic guidance and good spread was noted from C4-5 space bilaterally. 10 mg of dexamethasone mixed with 2 ml of preservative free normal saline was injected with minimal pressure. The needle was cleared with preservative free normal saline and removed. Band aid was applied.  Following the procedure the patient's vital signs were stable. The patient was discharged home in good condition after being given discharge instructions.    COMPLICATIONS: None     Chris Mccarthy DO  Pain Management   [facility]

## 2021-09-07 NOTE — DISCHARGE INSTRUCTIONS
EPIDURAL STEROID INJECTION          An epidural steroid injection is a shot of steroid medicine and numbing medicine that is given into the space between the spinal cord and the bones of the back (epidural space).  The injection helps relieve pain by an irritated or swollen nerve root.    TELL YOUR HEALTH CARE PROVIDER ABOUT:  • Any allergies you have  • All medicines you are taking including any over the counter medicines  • Any blood disorders you have  • Any surgeries you have had  • Any medical conditions you have  • Whether you are pregnant or may be pregnant    WHAT ARE THE RISK?  Generally, this is a safe procedure. However,problems may occur, including  • Headache  • Bleeding  • Infection  • Allergic Reaction  • Nerve Damage    WHAT CAN I EXPECT AFTER THE PROCEDURE?    INJECTION SITE  • Remove the Band-Aid/s after 24 hours  • Check your injection site every day for signs of infection.  Check for:             Redness             Bleeding (small amt is normal)             Warmth             Pus or bad odor  • Some numbness may be experienced for several hours following the procedure.  • Avoid using heat on the injection site for 24 hours. You may use ice intermittently if needed by placing a         towel between your skin and the ice bag and using the ice for 20 minutes 2-3 times a day.  • Do not take baths, swim or use a hot tub for 24 hours.    ACTIVITY  • No strenuous activity for 24 hours then return to normal activity as tolerated.  • If your leg is numb, no driving until full sensation and strength has returned.    GENERAL INSTRUCTIONS:  • The injection site may feel numb, use ice with caution if numbness is present and no heat for 24 hours or until numbness is gone.   • If you have numbness or weakness in your arm or leg, use those areas with caution until normal sensation returns.  • It is not uncommon to notice an increase in discomfort or a change in the location of discomfort for 3-4 days after  the procedure.  If discomfort is noticed at the injection site, ice may be            applied to that area for 20 min 2-3 times a day.  • Take the pain medicine your physician has prescribed or over the counter pain relievers as long as you do not have any contraindications.  • If you are a diabetic, monitor your blood sugar closely.  The steroids used in your procedure may increase your blood sugar level up to 36 hours after the injection.  If your blood sugar is greater than 250, call the physician that helps you monitor your blood sugar.  • Keep all follow-up visits as scheduled by your health care provider. This is important.    CONTACT OUR OFFICE IF:  • You have any of these signs of infection            -Redness, swelling, or warmth around your injection site.            -Fluid or blood coming from your injection site (small amt of blood is normal)            -Pus or a bad odor from your injection site            -A fever  • You develop a severe headache or a stiff neck  • You lose control of your bladder or bowel movements      PAIN MANAGEMENT CENTER HOURS   • Monday-Friday 7:30 am. - 4:00 pm.  For any problem related to your procedure we can be reached at [phone]  • If you experience an emergency with your procedure, call [phone] or go to the emergency room.

## 2021-09-07 NOTE — H&P
H and P reviewed from previous visit and no changes to patient's clinical presentation. Will proceed with procedure as planned. Patient denies history of DM and being on blood thinners.    Chris Mccarthy DO  Pain Management   [facility]

## 2021-09-08 NOTE — TELEPHONE ENCOUNTER
Spoke with pt.  Sts doing well, just a little sore.  Reminded him he could utilize ice today and heat tomorrow if desired.  Told to give us a call with any questions or concerns.

## 2021-09-15 NOTE — TELEPHONE ENCOUNTER
Spoke to Daughter Ritika. Patient had some relief after the injection, but complaining of lot of tightness around neck and some pain with turning of his head.  Patient had tried tramadol in the past which caused constipation.  Discussed with patient's daughter that any opioids unfortunately will cause constipation.  We will try tizanidine 4 mg at bedtime as needed.  Recommended to take half a tablet for 2 days and increase it to 1 full tablet after 2 days only if he does not feel any significant drowsiness or dizziness.  All the other questions answered.  Prescription for tizanidine 4 mg - 30 tablets sent to [facility].

## 2021-09-15 NOTE — TELEPHONE ENCOUNTER
Daughter left message that patient wants to know if there was any type pain medication he could take until his next visit on 9/27?

## 2021-09-22 NOTE — PROGRESS NOTES
Subjective   Prabhjot Roldan is a 86 y.o. male is here for follow up for neck pain. Patient was last seen on 9/7/2021 for MAUREEN C7-T1 with catheter to C4-5 on the right with 20% pain relief for about 2 daysa.  He was able to have better neck movement after the injection. Daughter called in several days after injection stating that he is complaining of a lot of tightness around his neck.  He was started on tizanidine at bedtime.    On last visit: Started on Tylenol 3-did not help, stopped it.  Tizanidine-helps sleep at night    Neck pain is 9/10 on VAS, at maximum 9/10.  Pain is aching, sharp, stabbing, throbbing in nature.  Referred behind ear to the right shoulder.  Pain is constant.  Improved by aspirin.  Worse with lying down, lifting, exercise.    Previous Injection:   9/7/[id number]-[id number]-J2-T1 with catheter to C4-5 on R - 20% pain relief for 2 days.     Hx: referred by Dr. Baltazar Centeno MD for epidural at C4-5. Neck pain started around Oct 2020 without any injuries.        PHQ-9- 2  SOAPP- 0     PMH:   HTN, Hx of rotator cuff on right side     Current Medications:   Aspirin  Tizanidine 4 mg nightly as needed     Past Medications:  Baclofen 5 mg 3 times daily as needed  Tramadol 50 mg  meloxicam   Tylenol #3-did not help at all.    Past Modalities:  TENS:                                                                          no                                                  Physical Therapy Within The Last 6 Months              Yes  + water aquatics   Psychotherapy                                                            no  Massage Therapy                                                       no     Patient Complains Of:  Uro-Fecal Incontinence          no  Weight Gain/Loss                   no  Fever/Chills                             no  Weakness                               no      Current Outpatient Medications:   •  amLODIPine (NORVASC) 10 MG tablet, , Disp: , Rfl:   •  aspirin (Adult Aspirin EC Low  Strength) 81 MG EC tablet, ADULT ASPIRIN EC LOW STRENGTH 81 MG ORAL TABLET DELAYED RELEASE, Disp: , Rfl:   •  dilTIAZem CD (CARDIZEM CD) 240 MG 24 hr capsule, , Disp: , Rfl:   •  hydrALAZINE (APRESOLINE) 10 MG tablet, , Disp: , Rfl:   •  hydroCHLOROthiazide (HYDRODIURIL) 25 MG tablet, , Disp: , Rfl:   •  pravastatin (PRAVACHOL) 80 MG tablet, , Disp: , Rfl:   •  tiZANidine (ZANAFLEX) 4 MG tablet, Take 1 tablet by mouth At Night As Needed for Muscle Spasms for up to 30 days., Disp: 30 tablet, Rfl: 0  •  HYDROcodone-acetaminophen (NORCO) 5-325 MG per tablet, Take 1 tablet by mouth Daily As Needed for Severe Pain  for up to 7 days., Disp: 7 tablet, Rfl: 0  •  [START ON 10/3/2021] HYDROcodone-acetaminophen (NORCO) 5-325 MG per tablet, Take 1 tablet by mouth Daily As Needed for Severe Pain  for up to 30 days., Disp: 30 tablet, Rfl: 0    The following portions of the patient's history were reviewed and updated as appropriate: allergies, current medications, past family history, past medical history, past social history, past surgical history, and problem list.      REVIEW OF PERTINENT MEDICAL DATA    Past Medical History:   Diagnosis Date   • Arthritis    • Hyperlipidemia    • Hypertension    • Neck pain      Past Surgical History:   Procedure Laterality Date   • HERNIA REPAIR       Family History   Problem Relation Age of Onset   • Cancer Mother    • Diabetes Mother    • Cancer Father    • Prostate cancer Father    • Heart attack Father      Social History     Socioeconomic History   • Marital status:      Spouse name: Not on file   • Number of children: Not on file   • Years of education: Not on file   • Highest education level: Not on file   Tobacco Use   • Smoking status: Never Smoker   • Smokeless tobacco: Never Used   Vaping Use   • Vaping Use: Never used   Substance and Sexual Activity   • Alcohol use: Never   • Drug use: Defer   • Sexual activity: Defer         Review of Systems      Vitals:    09/27/21 1313  "  BP: 129/69   Pulse: 89   Resp: 16   SpO2: 92%   Weight: 86.2 kg (190 lb)   Height: 175.3 cm (69\")   PainSc:   9         Objective   Physical Exam  HENT:      Head:     Musculoskeletal:         General: Tenderness present.        Arms:    Neurological:      Deep Tendon Reflexes:      Reflex Scores:       Tricep reflexes are 2+ on the right side and 2+ on the left side.       Bicep reflexes are 2+ on the right side and 2+ on the left side.       Brachioradialis reflexes are 2+ on the right side and 2+ on the left side.     Comments: Motor strength 5/5 b/l UE  Sensory intact b/l UE             Imaging Reviewed:  MRI cervical spine-4/14/2021  C2-C3-facet degenerative changes (right.  C3-C4-facet degenerative changes and uncovertebral joint degenerative changes.  Moderate to severe bilateral neuroforaminal narrowing.  C4-C5-mild to moderate central canal narrowing.  Facet degenerative changes.  Severe bilateral neuroforaminal narrowing.  C5-C6-large marginal osteophyte on left uncovertebral joint degenerative changes.  Facet degenerative changes.  Severe left and moderate to severe right neuroforaminal narrowing.  Minimal central canal narrowing.  C6-7-mild facet joint change.  Mild to moderate bilateral neuroforaminal narrowing.  [id number] marginal osteophytes and uncovertebral degenerative changes on the right.  Severe right and mild left neuroforaminal narrowing.      Assessment:    1. DDD (degenerative disc disease), cervical    2. High risk medication use         1.  UDS on 8/9/2021 is consistent with patient's interview.  Narcotic contract signed - 8/9/2021.   2. We discussed trying a course of formal physical therapy.  Physical therapy can help strengthen and stretch the muscles around the joints. Continue to be as active as possible. Start physical therapy as it will help generalized pain and follow up with HEP.   3. Patient has pain in the neck with radicular pain in the arm, patient has failed " conservative therapy including PT and pharmacological management for more than 6 weeks and pain interferes with activities of daily living. Spurling’s test is positive. Some relief, but very short lastingMRI shows severe b/l neuroforaminal narrowing at C4-5 discussed repeat cervical SYLVIE C4-5 with catheter from C7-T1. Discussed the possibility of infection, bleeding, nerve damage, post dural puncture headache, increased pain, paraplegia. Patient understands and agrees.   4. Due to severe pain, will start Norco 5-325 mg - 0.5 tablet BID PRN (7 days followed by 30 days- 10/3). Discussed with the patient regarding long-term side effects of opioids including but not limited to opioid induced hormonal suppression, hyperalgesia, fatigue, weight gain, possible opioid induced altered immune system, addiction, tolerance, dependence, risk of hearing loss and elevated risk of myocardial infarction. Proper use and potential life threatening side effects of over use discussed with patient. Patient states understanding of their use and risks.   5. Continue Tizanidine 4 mg qhs prn. Common side effects discussed with the patient including but not limited to dry mouth, drowsiness, dizziness, lightheadedness, constipation, weakness and tiredness.        RTC for injection and then 2 week follow up.      Chris Mccarthy DO  Pain Management   [facility]            INSPECT REPORT    As part of the patient's treatment plan, I may be prescribing controlled substances. The patient has been made aware of appropriate use of such medications, including potential risk of somnolence, limited ability to drive and/or work safely, and the potential for dependence or overdose. It has also been made clear that these medications are for use by this patient only, without concomitant use of alcohol or other substances unless prescribed.     Patient has completed prescribing agreement detailing terms of continued prescribing of controlled substances,  including monitoring INSPECT reports, urine drug screening, and pill counts if necessary. The patient is aware that inappropriate use will results in cessation of prescribing such medications.    INSPECT report has been reviewed and scanned into the patient's chart.

## 2021-10-06 PROBLEM — A41.9 SEPSIS (HCC): Status: ACTIVE | Noted: 2021-01-01

## 2021-10-06 NOTE — CONSULTS
Group: [facility]         CONSULT NOTE    Patient Identification:  Prabhjot Roldan  86 y.o.  male  1935  [id number]            Requesting physician: Attending physician    Reason for Consultation: Lung mass      History of Present Illness:  86 y.o. male presents with acute mental status changes with lethargy, hypotension and electrolyte disturbance with right lower lobe lung mass       Assessment:    Right lower lobe mass which was not seen on imaging in 2015 suspicious for malignancy  Generalized weakness and lethargy and fatigue  Hyponatremia possible related to lung mass  COPD  Possible pneumonia with leukocytosis   hypokalemia  UTI      Recommendations:    Discussed with patient and family members at the bedside the approach to the right lung mass   would proceed with CT-guided biopsy  Interventional radiology will be consulted     antibiotics currently on cefepime will monitor neuro status  May DC vancomycin after 24-hour                  Review of Sytems:  Review of Systems   Constitutional: Positive for activity change, appetite change and fatigue.   Respiratory: Positive for shortness of breath.        Past Medical History:  Past Medical History:   Diagnosis Date   • Arthritis    • Hyperlipidemia    • Hypertension    • Neck pain        Past Surgical History:  Past Surgical History:   Procedure Laterality Date   • HERNIA REPAIR          Home Meds:  (Not in a hospital admission)      Allergies:  No Known Allergies    Social History:   Social History     Socioeconomic History   • Marital status:      Spouse name: Not on file   • Number of children: Not on file   • Years of education: Not on file   • Highest education level: Not on file   Tobacco Use   • Smoking status: Never Smoker   • Smokeless tobacco: Never Used   Vaping Use   • Vaping Use: Never used   Substance and Sexual Activity   • Alcohol use: Never   • Drug use: Defer   • Sexual activity: Defer       Family History:  Family  "History   Problem Relation Age of Onset   • Cancer Mother    • Diabetes Mother    • Cancer Father    • Prostate cancer Father    • Heart attack Father        Physical Exam:  /74   Pulse 98   Temp 95.4 °F (35.2 °C) (Rectal)   Resp 16   Ht 175.3 cm (69\")   Wt 86.2 kg (190 lb)   SpO2 94%   BMI 28.06 kg/m²  Body mass index is 28.06 kg/m². 94% 86.2 kg (190 lb)  Physical Exam  Cardiovascular:      Heart sounds: Murmur heard.     Pulmonary:      Breath sounds: Rales present.         LABS:  Lab Results   Component Value Date    CALCIUM 8.6 10/06/2021     Results from last 7 days   Lab Units 10/06/21  0906 10/05/21  1951 10/05/21  1951   MAGNESIUM mg/dL 1.5*  --  1.5*   SODIUM mmol/L 123*  --  120*   POTASSIUM mmol/L 3.1*  --  2.5*   CHLORIDE mmol/L 82*  --  75*   CO2 mmol/L 25.0  --  21.0*   BUN mg/dL 14  --  16   CREATININE mg/dL 0.67*  --  0.96   GLUCOSE mg/dL 101*   < > 178*   CALCIUM mg/dL 8.6  --  8.9   WBC 10*3/mm3 19.60*  --  14.20*   HEMOGLOBIN g/dL 12.6*  --  11.7*   PLATELETS 10*3/mm3 316  --  392   ALT (SGPT) U/L 177*  --  92*   AST (SGOT) U/L 260*  --  155*    < > = values in this interval not displayed.     Lab Results   Component Value Date    CKTOTAL 97 10/05/2021    TROPONINT <0.010 10/05/2021     Results from last 7 days   Lab Units 10/05/21  1951   CK TOTAL U/L 97   TROPONIN T ng/mL <0.010     Results from last 7 days   Lab Units 10/05/21  1957   URINECX  Culture in progress     Results from last 7 days   Lab Units 10/05/21  2035 10/05/21  2001   LACTATE mmol/L 4.1* 4.0*             Results from last 7 days   Lab Units 10/05/21  1951   INR  1.18*     Results from last 7 days   Lab Units 10/05/21  1957   URINECX  Culture in progress     Lab Results   Component Value Date    TSH 1.420 10/05/2021     Estimated Creatinine Clearance: 74.8 mL/min (A) (by C-G formula based on SCr of 0.67 mg/dL (L)).  Results from last 7 days   Lab Units 10/05/21  1957   NITRITE UA  Negative   WBC UA /HPF 31-50* "   BACTERIA UA /HPF None Seen   SQUAM EPITHEL UA /HPF 7-12*   URINECX  Culture in progress        Imaging:  Imaging Results (Last 24 Hours)     Procedure Component Value Units Date/Time    CT Head Without Contrast [[id number]] Collected: 10/05/21 2335     Updated: 10/05/21 2338    Narrative:      EXAMINATION: CT HEAD WO CONTRAST    DATE: 10/5/2021 10:28 PM     INDICATION: Altered mental status. Dizziness and weakness.     COMPARISON: None available.     TECHNIQUE: Thin section noncontrast axial images were obtained through the head. Coronal reformatted images were created.  CT dose lowering techniques were used, to include: automated exposure control, adjustment for patient size, and or use of iterative   reconstruction    FINDINGS:  Intracranial contents:    Generalized parenchymal volume loss without lobar predominance. No hydrocephalus.   A few foci of hypoattenuation within periventricular white matter most commonly represent chronic microangiopathy.  There is no midline shift or mass effect. No   hemorrhage, mass lesion, or evidence of evolving large territorial infarct. Intracranial atherosclerosis.    Bones and extracranial soft tissues:    Normal calvarium.  Orbits unremarkable. The visualized paranasal sinuses are clear. No mastoid or middle ear effusions.        Impression:        No acute intracranial abnormality.     Electronically signed by:  Lenny Mckee    10/5/2021 9:37 PM    CT Abdomen Pelvis With Contrast [[id number]] Collected: 10/05/21 2330     Updated: 10/05/21 2331    Narrative:      Exam: CT thorax with contrast    Date: October 5, 2021    History: Generalized weakness, hypotension, dehydration, shortness of breath    Comparison: None available    Technique: Contiguous axial CT images obtained of the thorax, abdomen and pelvis following the uneventful intravenous administration of 100 mL Isovue-370 contrast. Delayed imaging was obtained. Additionally, sagittal and coronal reformatted images  were   obtained. CT dose lowering techniques were used, to include: automated exposure control, adjustment for patient size, and or use of iterative reconstruction.    Findings:    Thoracic aorta: There are diffuse atherosclerotic changes. There is no aneurysm or dissection.    HEART: The heart is borderline in size. There are coronary artery calcifications.    Pulmonary arteries: The pulmonary arteries are well visualized. There is no filling defect to suggest an acute pulmonary embolus.    Mediastinum: There is an enlarged subcarinal lymph node measuring 1.8 cm in short axis.    Lung parenchyma: There is a subtle background of emphysema. There are reticulonodular type infiltrates in the right upper lobe. There is interlobular septal thickening in both lungs. There are nodular type groundglass and confluent groundglass airspace   opacities in the left upper lobe and lingula. There is a large low-density mass in the right lung base measuring up to 9.5 x 5.3 cm. This is well visualized on series 2 image 75. There is pleural thickening along the lateral aspect of the right lung.    CT ABDOMEN:  Liver: The liver is mildly hypodense. The liver demonstrates a very subtle micronodular contour.    Spleen: Normal.    Pancreas: Normal.    Adrenal glands: Normal.    Gallbladder: The gallbladder is mildly distended.    Kidneys: The kidneys demonstrate symmetric enhancement. There is no hydronephrosis or obstructive uropathy.    Abdominal mesentery: There is no pathologic intra-abdominal mass or adenopathy.    Bowel loops: There is colonic diverticulosis. The appendix is normal. There is no small bowel obstruction.    CT PELVIS:  There is a urinary bladder diverticulum along the left lateral aspect of the urinary bladder. There is a questionable undescended right testicle. This can be correlated with physical exam.    Abdominal aorta: There are severe atherosclerotic changes.    Osseous structures: There is levoconvex scoliotic  curvature of the lumbar spine. There are moderate or moderate to severe degenerative changes in the spine. There are severe degenerative changes in the left shoulder and moderate degenerative changes in   the right shoulder. No acute fractures are identified.      Impression:      1. There is a large low-density mass in the right lung base measuring 9.5 x 5.3 cm. This is favored to represent a neoplastic process which must be considered and excluded. This lesion would be amenable to CT-guided biopsy as determined clinically.   Alternatively, a PET/CT study could be obtained for better characterization. Follow-up is recommended.  2. Reticulonodular type infiltrates in the right upper lobe with additional nodular type groundglass and confluent groundglass airspace opacities in the left upper lobe and lingula. Findings are most likely related to infectious bronchopneumonia,   possibly related to viral pneumonia and/or aspiration. COVID pneumonia should be considered.  3. Element of interlobular septal thickening may represent early pulmonary edema.  4. Emphysema.  5. Advanced coronary artery calcifications.  6. Enlarged subcarinal lymph node measuring 1.8 cm in short axis. Metastatic disease must be excluded.  7. Hepatic steatosis. The liver also demonstrates a very subtle micronodular contour which may represent liver disease or early cirrhosis. Correlation with hepatic function is recommended.  8. Diverticulosis.  9. Severe atherosclerotic disease.  10. Additional incidental and chronic findings as above.    *[employer] SOCIETY GUIDELINES:  Low risk patient:  <6mm - Low Risk, no further f/u  >6-8mm - low dose CT 6-12 mo, then 2nd f/u CT at 18-24mo  >8mm - low dose CT 3,9,24mo OR PET/CT OR Biopsy    High Risk Patient:  <6mm - optional CT at 12 mo  >6-8mm - low dose CT 6-12 mo, then 2nd f/u CT at 18-24mo  >8mm - low dose CT at 3 mo, OR PET/CT OR Biopsy    MULTIPLE NODULES:  Low Risk Patient:  <6mm - Low Risk, no  further f/u  >6-8mm - low dose CT 3-6 mo, then 2nd f/u CT at 18-24mo  >8mm - low dose CT 3-6 mo, then 2nd f/u CT at 18-24mo    High Risk Patient:  <6mm - High risk, multiple nodules, optional CT at 12 mo  >6-8mm - low dose CT 3-6 mo, then 2nd f/u CT at 18-24mo  >8mm - low dose CT 3-6 mo, then 2nd f/u CT at 18-24mo        Slot 67    Electronically signed by:  Arcenio Vanegas M.D.    10/5/2021 9:30 PM    CT Chest With Contrast Diagnostic [[id number]] Collected: 10/05/21 2318     Updated: 10/05/21 2331    Narrative:      Exam: CT thorax with contrast    Date: October 5, 2021    History: Generalized weakness, hypotension, dehydration, shortness of breath    Comparison: None available    Technique: Contiguous axial CT images obtained of the thorax, abdomen and pelvis following the uneventful intravenous administration of 100 mL Isovue-370 contrast. Delayed imaging was obtained. Additionally, sagittal and coronal reformatted images were   obtained. CT dose lowering techniques were used, to include: automated exposure control, adjustment for patient size, and or use of iterative reconstruction.    Findings:    Thoracic aorta: There are diffuse atherosclerotic changes. There is no aneurysm or dissection.    HEART: The heart is borderline in size. There are coronary artery calcifications.    Pulmonary arteries: The pulmonary arteries are well visualized. There is no filling defect to suggest an acute pulmonary embolus.    Mediastinum: There is an enlarged subcarinal lymph node measuring 1.8 cm in short axis.    Lung parenchyma: There is a subtle background of emphysema. There are reticulonodular type infiltrates in the right upper lobe. There is interlobular septal thickening in both lungs. There are nodular type groundglass and confluent groundglass airspace   opacities in the left upper lobe and lingula. There is a large low-density mass in the right lung base measuring up to 9.5 x 5.3 cm. This is well visualized on series 2  image 75. There is pleural thickening along the lateral aspect of the right lung.    CT ABDOMEN:  Liver: The liver is mildly hypodense. The liver demonstrates a very subtle micronodular contour.    Spleen: Normal.    Pancreas: Normal.    Adrenal glands: Normal.    Gallbladder: The gallbladder is mildly distended.    Kidneys: The kidneys demonstrate symmetric enhancement. There is no hydronephrosis or obstructive uropathy.    Abdominal mesentery: There is no pathologic intra-abdominal mass or adenopathy.    Bowel loops: There is colonic diverticulosis. The appendix is normal. There is no small bowel obstruction.    CT PELVIS:  There is a urinary bladder diverticulum along the left lateral aspect of the urinary bladder. There is a questionable undescended right testicle. This can be correlated with physical exam.    Abdominal aorta: There are severe atherosclerotic changes.    Osseous structures: There is levoconvex scoliotic curvature of the lumbar spine. There are moderate or moderate to severe degenerative changes in the spine. There are severe degenerative changes in the left shoulder and moderate degenerative changes in   the right shoulder. No acute fractures are identified.      Impression:      1. There is a large low-density mass in the right lung base measuring 9.5 x 5.3 cm. This is favored to represent a neoplastic process which must be considered and excluded. This lesion would be amenable to CT-guided biopsy as determined clinically.   Alternatively, a PET/CT study could be obtained for better characterization. Follow-up is recommended.  2. Reticulonodular type infiltrates in the right upper lobe with additional nodular type groundglass and confluent groundglass airspace opacities in the left upper lobe and lingula. Findings are most likely related to infectious bronchopneumonia,   possibly related to viral pneumonia and/or aspiration. COVID pneumonia should be considered.  3. Element of interlobular  septal thickening may represent early pulmonary edema.  4. Emphysema.  5. Advanced coronary artery calcifications.  6. Enlarged subcarinal lymph node measuring 1.8 cm in short axis. Metastatic disease must be excluded.  7. Hepatic steatosis. The liver also demonstrates a very subtle micronodular contour which may represent liver disease or early cirrhosis. Correlation with hepatic function is recommended.  8. Diverticulosis.  9. Severe atherosclerotic disease.  10. Additional incidental and chronic findings as above.    *[employer] SOCIETY GUIDELINES:  Low risk patient:  <6mm - Low Risk, no further f/u  >6-8mm - low dose CT 6-12 mo, then 2nd f/u CT at 18-24mo  >8mm - low dose CT 3,9,24mo OR PET/CT OR Biopsy    High Risk Patient:  <6mm - optional CT at 12 mo  >6-8mm - low dose CT 6-12 mo, then 2nd f/u CT at 18-24mo  >8mm - low dose CT at 3 mo, OR PET/CT OR Biopsy    MULTIPLE NODULES:  Low Risk Patient:  <6mm - Low Risk, no further f/u  >6-8mm - low dose CT 3-6 mo, then 2nd f/u CT at 18-24mo  >8mm - low dose CT 3-6 mo, then 2nd f/u CT at 18-24mo    High Risk Patient:  <6mm - High risk, multiple nodules, optional CT at 12 mo  >6-8mm - low dose CT 3-6 mo, then 2nd f/u CT at 18-24mo  >8mm - low dose CT 3-6 mo, then 2nd f/u CT at 18-24mo        Slot 67    Electronically signed by:  Arcenio Vanegas M.D.    10/5/2021 9:30 PM    XR Chest 1 View [[id number]] Collected: 10/05/21 2104     Updated: 10/05/21 2108    Narrative:      Examination: XR CHEST 1 VW-     Date of Exam: 10/5/2021 8:10 PM     Indication: Weakness.       Comparison: 05/20/2015     Technique: 1 view of the chest      FINDINGS:  The heart size is within normal. There is a lobulated opacity in the  inferior lateral right chest that measures about 5.5 cm; the lower  margin of this is obscured. There is some adjacent pleural thickening or  fluid in the inferior lateral right chest. These findings have developed  since the 2015 exam. There may be some minor patchy  airspace opacity in  the lateral aspect of the left midlung. There is aortic calcification.  No significant bone abnormality.       Impression:      1. There is an abnormality in the right lower chest. Part of this has a  masslike configuration. It may be due to parenchymal parenchymal  disease, pleural disease, or both. A lung mass is possible. Further  evaluation with chest CT with contrast is recommended.  2. Question some minor opacity in the lateral left midlung which may be  due to pneumonia.     Electronically Signed By-Margarita Cooper MD On:10/5/2021 9:06 PM  This report was finalized on [id number] by  Margarita Cooper MD.            Current Meds:   SCHEDULE  aspirin, 81 mg, Oral, Daily  atorvastatin, 20 mg, Oral, Nightly  budesonide, 0.5 mg, Nebulization, BID - RT  cefepime, 2 g, Intravenous, Q8H  dilTIAZem CD, 180 mg, Oral, Q24H  enoxaparin, 40 mg, Subcutaneous, Daily  ipratropium-albuterol, 3 mL, Nebulization, 4x Daily - RT  [START ON 10/7/2021] vancomycin, 1,500 mg, Intravenous, Q24H      Infusions  Pharmacy to dose vancomycin,   custom IV infusion builder, , Last Rate: 75 mL/hr at 10/06/21 1418      PRNs  HYDROcodone-acetaminophen  •  magnesium sulfate **OR** magnesium sulfate **OR** magnesium sulfate  •  Pharmacy to dose vancomycin  •  potassium chloride **OR** potassium chloride **OR** potassium chloride  •  [COMPLETED] Insert peripheral IV **AND** sodium chloride  •  tiZANidine        Uzma Ochoa MD  10/6/2021  16:07 EDT      Much of this encounter note is an electronic transcription/translation of spoken language to printed text using Dragon Software.

## 2021-10-06 NOTE — CASE MANAGEMENT/SOCIAL WORK
Discharge Planning Assessment   [place]     Patient Name: Prabhjot Roldan  MRN: [id number]  Today's Date: 10/6/2021    Admit Date: 10/5/2021    Discharge Needs Assessment     Row Name 10/06/21 1637       Living Environment    Lives With  alone    Unique Family Situation  Pt states Eli lives across street    Current Living Arrangements  home/apartment/condo    Primary Care Provided by  self    Provides Primary Care For  no one    Family Caregiver if Needed  child(ilir), adult    Family Caregiver Names  Pt reports daughters can assist him. Eli confirms this, and states he  and pt's granddaughter are available to help if needed    Quality of Family Relationships  helpful    Able to Return to Prior Arrangements  yes       Resource/Environmental Concerns    Resource/Environmental Concerns  none       Transition Planning    Patient/Family Anticipates Transition to  home    Patient/Family Anticipated Services at Transition  none    Transportation Anticipated  family or friend will provide       Discharge Needs Assessment    Equipment Currently Used at Home  rollator    Concerns to be Addressed  denies needs/concerns at this time    Concerns Comments  I mentioned possible need for services at [facility]. Patient appears weak. Pt and Eli state they don't think patient will need services such as home health or rehab at [facility] chat sent to Dr. Baez to request he place a  PT eval order. Response pending    Equipment Needed After Discharge  none    Provided Post Acute Provider List?  Yes    Post Acute Provider List  Nursing Home;Inpatient Rehab;Home Health    Provided Post Acute Provider Quality & Resource List?  Refused    Delivered To  Patient;Support Person    Support Person  Eli    Method of Delivery  In person    Current Discharge Risk  lives alone        Discharge Plan     Row Name 10/06/21 1641       Plan    Plan  Home    Provided Post Acute Provider List?  Yes    Post Acute Provider List  Home  Health;Nursing Home;Inpatient Rehab    Provided Post Acute Provider Quality & Resource List?  Refused    Delivered To  Patient;Support Person    Support Person  Eli    Method of Delivery  In person    Patient/Family in Agreement with Plan  yes    Plan Comments  Pt and daughter Eli deny patient will have needs at [facility].Eli states she and  live close and can assist patient if needed.She states there are also other family members to assist pt if needed.Pt confirms his PCP is Dr. ROSALIA Baez and he uses [facility] in [place].Pt was alert and oriented x3 when I spoke with him        Continued Care and Services - Admitted Since 10/5/2021    Coordination has not been started for this encounter.         Demographic Summary     Row Name 10/06/21 1635       General Information    Admission Type  inpatient    Arrived From  home    Referral Source  admission list    Reason for Consult  discharge planning    Preferred Language  English     Used During This Interaction  no    General Information Comments  I met with patient and daughter Bonita  in room wearing PPE: mask and goggles. Maintained distance greater than six feet and spent </=15 minutes in the room       Contact Information    Permission Granted to Share Info With  ;family/designee    Contact Information Obtained for          Functional Status     Row Name 10/06/21 1636       Functional Status    Usual Activity Tolerance  good    Current Activity Tolerance  fair       Functional Status, IADL    IADL Comments  Pt repports independence with ADLs.States he normally does own grocery shopping and meal prep.Daughter report he was traveling throughout [place] with another family member up until a few weeks ago        Angelica Colbert RN, [facility]  Office: [phone]  Fax: [phone]  [email or web address]        Angelica Colbert RN

## 2021-10-06 NOTE — CONSULTS
NEPHROLOGY CONSULTATION-----[facility]    [facility]  [phone]  Uche Mojica MD    Patient Care Team:  Herve Baez MD as PCP - General (Family Medicine)  Uche Mojica MD as Consulting Physician (Nephrology)  Estefania Sanchez MD as Consulting Physician (Hematology and Oncology)    CC/REASON FOR CONSULTATION: Electrolyte abnormalities-low potassium, low magnesium, hyponatremia    PHYSICIAN REQUESTING CONSULTATION: Dr. Baez    History of Present Illness  86-year-old male with past medical history of hypertension hyperlipidemia presents to the ER with change in mental status and lethargic.  He was hypotensive on initial presentation.  His potassium is 2.5, Na 120, Mag-1.5.  His creatinine 0.9.  Is admitted with pneumonia. He was on HCTZ prior to admission. No dysuria, gross hematuria. No chest pain or SOA>    Review of Systems   As noted above, otherwise 10 systems reviewed and were negative.    Past Medical History:   Diagnosis Date   • Arthritis    • Hyperlipidemia    • Hypertension    • Neck pain        Past Surgical History:   Procedure Laterality Date   • HERNIA REPAIR         Family History   Problem Relation Age of Onset   • Cancer Mother    • Diabetes Mother    • Cancer Father    • Prostate cancer Father    • Heart attack Father        Social History     Tobacco Use   • Smoking status: Never Smoker   • Smokeless tobacco: Never Used   Vaping Use   • Vaping Use: Never used   Substance Use Topics   • Alcohol use: Never   • Drug use: Defer       Home Meds: (Not in a hospital admission)      Scheduled Meds:  aspirin, 81 mg, Oral, Daily  atorvastatin, 20 mg, Oral, Daily  budesonide, 0.5 mg, Nebulization, BID - RT  cefepime, 2 g, Intravenous, Q8H  dilTIAZem CD, 180 mg, Oral, Q24H  enoxaparin, 40 mg, Subcutaneous, Daily  ipratropium-albuterol, 3 mL, Nebulization, 4x Daily - RT  [START ON 10/7/2021] vancomycin, 1,500 mg, Intravenous,  Q24H        Continuous Infusions:  Pharmacy to dose vancomycin,   custom IV infusion builder,   sodium chloride, 20 mL/hr        PRN Meds:  HYDROcodone-acetaminophen  •  magnesium sulfate **OR** magnesium sulfate **OR** magnesium sulfate  •  Pharmacy to dose vancomycin  •  potassium chloride **OR** potassium chloride **OR** potassium chloride  •  [COMPLETED] Insert peripheral IV **AND** sodium chloride  •  tiZANidine    Allergies:  Patient has no known allergies.    OBJECTIVE    Vital Signs  Temp:  [95.4 °F (35.2 °C)] 95.4 °F (35.2 °C)  Heart Rate:  [] 90  Resp:  [17-21] 17  BP: ()/(4-84) 123/66    No intake/output data recorded.  No intake/output data recorded.    Physical Exam:  General Appearance: alert, appears stated age and cooperative  Head: normocephalic, without obvious abnormality and atraumatic  Eyes: conjunctivae and sclerae normal and no icterus  Neck: supple and no JVD  Lungs: clear to auscultation and respirations regular  Heart: regular rhythm & normal rate and normal S1, S2  Chest Wall: no abnormalities observed  Abdomen: normal bowel sounds and soft non-tender  Extremities: moves extremities well, no edema, no cyanosis  Skin: no bleeding, bruising or rash  Neurologic: Alert, and oriented. No focal deficits    Results Review:    I reviewed the patient's new clinical results.    WBC WBC   Date Value Ref Range Status   10/06/2021 19.60 (H) 3.40 - 10.80 10*3/mm3 Final   10/05/2021 14.20 (H) 3.40 - 10.80 10*3/mm3 Final      HGB Hemoglobin   Date Value Ref Range Status   10/06/2021 12.6 (L) 13.0 - 17.7 g/dL Final   10/05/2021 11.7 (L) 13.0 - 17.7 g/dL Final      HCT Hematocrit   Date Value Ref Range Status   10/06/2021 36.9 (L) 37.5 - 51.0 % Final   10/05/2021 33.8 (L) 37.5 - 51.0 % Final      Platlets No results found for: LABPLAT   MCV MCV   Date Value Ref Range Status   10/06/2021 86.4 79.0 - 97.0 fL Final   10/05/2021 86.3 79.0 - 97.0 fL Final          Sodium Sodium   Date Value Ref Range  Status   10/06/2021 123 (L) 136 - 145 mmol/L Final   10/05/2021 120 (L) 136 - 145 mmol/L Final      Potassium Potassium   Date Value Ref Range Status   10/06/2021 3.1 (L) 3.5 - 5.2 mmol/L Final   10/05/2021 2.5 (C) 3.5 - 5.2 mmol/L Final      Chloride Chloride   Date Value Ref Range Status   10/06/2021 82 (L) 98 - 107 mmol/L Final   10/05/2021 75 (L) 98 - 107 mmol/L Final      CO2 CO2   Date Value Ref Range Status   10/06/2021 25.0 22.0 - 29.0 mmol/L Final   10/05/2021 21.0 (L) 22.0 - 29.0 mmol/L Final      BUN BUN   Date Value Ref Range Status   10/06/2021 14 8 - 23 mg/dL Final   10/05/2021 16 8 - 23 mg/dL Final      Creatinine Creatinine   Date Value Ref Range Status   10/06/2021 0.67 (L) 0.76 - 1.27 mg/dL Final   10/05/2021 0.96 0.76 - 1.27 mg/dL Final      Calcium Calcium   Date Value Ref Range Status   10/06/2021 8.6 8.6 - 10.5 mg/dL Final   10/05/2021 8.9 8.6 - 10.5 mg/dL Final      PO4 No results found for: CAPO4   Albumin Albumin   Date Value Ref Range Status   10/06/2021 3.00 (L) 3.50 - 5.20 g/dL Final   10/05/2021 3.30 (L) 3.50 - 5.20 g/dL Final      Magnesium Magnesium   Date Value Ref Range Status   10/06/2021 1.5 (L) 1.6 - 2.4 mg/dL Final   10/05/2021 1.5 (L) 1.6 - 2.4 mg/dL Final      Uric Acid No results found for: URICACID       Imaging Results (Last 72 Hours)     Procedure Component Value Units Date/Time    CT Head Without Contrast [[id number]] Collected: 10/05/21 2335     Updated: 10/05/21 2338    Narrative:      EXAMINATION: CT HEAD WO CONTRAST    DATE: 10/5/2021 10:28 PM     INDICATION: Altered mental status. Dizziness and weakness.     COMPARISON: None available.     TECHNIQUE: Thin section noncontrast axial images were obtained through the head. Coronal reformatted images were created.  CT dose lowering techniques were used, to include: automated exposure control, adjustment for patient size, and or use of iterative   reconstruction    FINDINGS:  Intracranial contents:    Generalized parenchymal  volume loss without lobar predominance. No hydrocephalus.   A few foci of hypoattenuation within periventricular white matter most commonly represent chronic microangiopathy.  There is no midline shift or mass effect. No   hemorrhage, mass lesion, or evidence of evolving large territorial infarct. Intracranial atherosclerosis.    Bones and extracranial soft tissues:    Normal calvarium.  Orbits unremarkable. The visualized paranasal sinuses are clear. No mastoid or middle ear effusions.        Impression:        No acute intracranial abnormality.     Electronically signed by:  Lenny Mckee    10/5/2021 9:37 PM    CT Abdomen Pelvis With Contrast [[id number]] Collected: 10/05/21 2330     Updated: 10/05/21 2331    Narrative:      Exam: CT thorax with contrast    Date: October 5, 2021    History: Generalized weakness, hypotension, dehydration, shortness of breath    Comparison: None available    Technique: Contiguous axial CT images obtained of the thorax, abdomen and pelvis following the uneventful intravenous administration of 100 mL Isovue-370 contrast. Delayed imaging was obtained. Additionally, sagittal and coronal reformatted images were   obtained. CT dose lowering techniques were used, to include: automated exposure control, adjustment for patient size, and or use of iterative reconstruction.    Findings:    Thoracic aorta: There are diffuse atherosclerotic changes. There is no aneurysm or dissection.    HEART: The heart is borderline in size. There are coronary artery calcifications.    Pulmonary arteries: The pulmonary arteries are well visualized. There is no filling defect to suggest an acute pulmonary embolus.    Mediastinum: There is an enlarged subcarinal lymph node measuring 1.8 cm in short axis.    Lung parenchyma: There is a subtle background of emphysema. There are reticulonodular type infiltrates in the right upper lobe. There is interlobular septal thickening in both lungs. There are nodular type  groundglass and confluent groundglass airspace   opacities in the left upper lobe and lingula. There is a large low-density mass in the right lung base measuring up to 9.5 x 5.3 cm. This is well visualized on series 2 image 75. There is pleural thickening along the lateral aspect of the right lung.    CT ABDOMEN:  Liver: The liver is mildly hypodense. The liver demonstrates a very subtle micronodular contour.    Spleen: Normal.    Pancreas: Normal.    Adrenal glands: Normal.    Gallbladder: The gallbladder is mildly distended.    Kidneys: The kidneys demonstrate symmetric enhancement. There is no hydronephrosis or obstructive uropathy.    Abdominal mesentery: There is no pathologic intra-abdominal mass or adenopathy.    Bowel loops: There is colonic diverticulosis. The appendix is normal. There is no small bowel obstruction.    CT PELVIS:  There is a urinary bladder diverticulum along the left lateral aspect of the urinary bladder. There is a questionable undescended right testicle. This can be correlated with physical exam.    Abdominal aorta: There are severe atherosclerotic changes.    Osseous structures: There is levoconvex scoliotic curvature of the lumbar spine. There are moderate or moderate to severe degenerative changes in the spine. There are severe degenerative changes in the left shoulder and moderate degenerative changes in   the right shoulder. No acute fractures are identified.      Impression:      1. There is a large low-density mass in the right lung base measuring 9.5 x 5.3 cm. This is favored to represent a neoplastic process which must be considered and excluded. This lesion would be amenable to CT-guided biopsy as determined clinically.   Alternatively, a PET/CT study could be obtained for better characterization. Follow-up is recommended.  2. Reticulonodular type infiltrates in the right upper lobe with additional nodular type groundglass and confluent groundglass airspace opacities in the  left upper lobe and lingula. Findings are most likely related to infectious bronchopneumonia,   possibly related to viral pneumonia and/or aspiration. COVID pneumonia should be considered.  3. Element of interlobular septal thickening may represent early pulmonary edema.  4. Emphysema.  5. Advanced coronary artery calcifications.  6. Enlarged subcarinal lymph node measuring 1.8 cm in short axis. Metastatic disease must be excluded.  7. Hepatic steatosis. The liver also demonstrates a very subtle micronodular contour which may represent liver disease or early cirrhosis. Correlation with hepatic function is recommended.  8. Diverticulosis.  9. Severe atherosclerotic disease.  10. Additional incidental and chronic findings as above.    *[employer] GUIDELINES:  Low risk patient:  <6mm - Low Risk, no further f/u  >6-8mm - low dose CT 6-12 mo, then 2nd f/u CT at 18-24mo  >8mm - low dose CT 3,9,24mo OR PET/CT OR Biopsy    High Risk Patient:  <6mm - optional CT at 12 mo  >6-8mm - low dose CT 6-12 mo, then 2nd f/u CT at 18-24mo  >8mm - low dose CT at 3 mo, OR PET/CT OR Biopsy    MULTIPLE NODULES:  Low Risk Patient:  <6mm - Low Risk, no further f/u  >6-8mm - low dose CT 3-6 mo, then 2nd f/u CT at 18-24mo  >8mm - low dose CT 3-6 mo, then 2nd f/u CT at 18-24mo    High Risk Patient:  <6mm - High risk, multiple nodules, optional CT at 12 mo  >6-8mm - low dose CT 3-6 mo, then 2nd f/u CT at 18-24mo  >8mm - low dose CT 3-6 mo, then 2nd f/u CT at 18-24mo        Slot 67    Electronically signed by:  Arcenio Vanegas M.D.    10/5/2021 9:30 PM    CT Chest With Contrast Diagnostic [[id number]] Collected: 10/05/21 2318     Updated: 10/05/21 2331    Narrative:      Exam: CT thorax with contrast    Date: October 5, 2021    History: Generalized weakness, hypotension, dehydration, shortness of breath    Comparison: None available    Technique: Contiguous axial CT images obtained of the thorax, abdomen and pelvis following the uneventful  intravenous administration of 100 mL Isovue-370 contrast. Delayed imaging was obtained. Additionally, sagittal and coronal reformatted images were   obtained. CT dose lowering techniques were used, to include: automated exposure control, adjustment for patient size, and or use of iterative reconstruction.    Findings:    Thoracic aorta: There are diffuse atherosclerotic changes. There is no aneurysm or dissection.    HEART: The heart is borderline in size. There are coronary artery calcifications.    Pulmonary arteries: The pulmonary arteries are well visualized. There is no filling defect to suggest an acute pulmonary embolus.    Mediastinum: There is an enlarged subcarinal lymph node measuring 1.8 cm in short axis.    Lung parenchyma: There is a subtle background of emphysema. There are reticulonodular type infiltrates in the right upper lobe. There is interlobular septal thickening in both lungs. There are nodular type groundglass and confluent groundglass airspace   opacities in the left upper lobe and lingula. There is a large low-density mass in the right lung base measuring up to 9.5 x 5.3 cm. This is well visualized on series 2 image 75. There is pleural thickening along the lateral aspect of the right lung.    CT ABDOMEN:  Liver: The liver is mildly hypodense. The liver demonstrates a very subtle micronodular contour.    Spleen: Normal.    Pancreas: Normal.    Adrenal glands: Normal.    Gallbladder: The gallbladder is mildly distended.    Kidneys: The kidneys demonstrate symmetric enhancement. There is no hydronephrosis or obstructive uropathy.    Abdominal mesentery: There is no pathologic intra-abdominal mass or adenopathy.    Bowel loops: There is colonic diverticulosis. The appendix is normal. There is no small bowel obstruction.    CT PELVIS:  There is a urinary bladder diverticulum along the left lateral aspect of the urinary bladder. There is a questionable undescended right testicle. This can be  correlated with physical exam.    Abdominal aorta: There are severe atherosclerotic changes.    Osseous structures: There is levoconvex scoliotic curvature of the lumbar spine. There are moderate or moderate to severe degenerative changes in the spine. There are severe degenerative changes in the left shoulder and moderate degenerative changes in   the right shoulder. No acute fractures are identified.      Impression:      1. There is a large low-density mass in the right lung base measuring 9.5 x 5.3 cm. This is favored to represent a neoplastic process which must be considered and excluded. This lesion would be amenable to CT-guided biopsy as determined clinically.   Alternatively, a PET/CT study could be obtained for better characterization. Follow-up is recommended.  2. Reticulonodular type infiltrates in the right upper lobe with additional nodular type groundglass and confluent groundglass airspace opacities in the left upper lobe and lingula. Findings are most likely related to infectious bronchopneumonia,   possibly related to viral pneumonia and/or aspiration. COVID pneumonia should be considered.  3. Element of interlobular septal thickening may represent early pulmonary edema.  4. Emphysema.  5. Advanced coronary artery calcifications.  6. Enlarged subcarinal lymph node measuring 1.8 cm in short axis. Metastatic disease must be excluded.  7. Hepatic steatosis. The liver also demonstrates a very subtle micronodular contour which may represent liver disease or early cirrhosis. Correlation with hepatic function is recommended.  8. Diverticulosis.  9. Severe atherosclerotic disease.  10. Additional incidental and chronic findings as above.    *[employer] GUIDELINES:  Low risk patient:  <6mm - Low Risk, no further f/u  >6-8mm - low dose CT 6-12 mo, then 2nd f/u CT at 18-24mo  >8mm - low dose CT 3,9,24mo OR PET/CT OR Biopsy    High Risk Patient:  <6mm - optional CT at 12 mo  >6-8mm - low dose CT 6-12  mo, then 2nd f/u CT at 18-24mo  >8mm - low dose CT at 3 mo, OR PET/CT OR Biopsy    MULTIPLE NODULES:  Low Risk Patient:  <6mm - Low Risk, no further f/u  >6-8mm - low dose CT 3-6 mo, then 2nd f/u CT at 18-24mo  >8mm - low dose CT 3-6 mo, then 2nd f/u CT at 18-24mo    High Risk Patient:  <6mm - High risk, multiple nodules, optional CT at 12 mo  >6-8mm - low dose CT 3-6 mo, then 2nd f/u CT at 18-24mo  >8mm - low dose CT 3-6 mo, then 2nd f/u CT at 18-24mo        Slot 67    Electronically signed by:  Arcenio Vanegas M.D.    10/5/2021 9:30 PM    XR Chest 1 View [[id number]] Collected: 10/05/21 2104     Updated: 10/05/21 2108    Narrative:      Examination: XR CHEST 1 VW-     Date of Exam: 10/5/2021 8:10 PM     Indication: Weakness.       Comparison: 05/20/2015     Technique: 1 view of the chest      FINDINGS:  The heart size is within normal. There is a lobulated opacity in the  inferior lateral right chest that measures about 5.5 cm; the lower  margin of this is obscured. There is some adjacent pleural thickening or  fluid in the inferior lateral right chest. These findings have developed  since the 2015 exam. There may be some minor patchy airspace opacity in  the lateral aspect of the left midlung. There is aortic calcification.  No significant bone abnormality.       Impression:      1. There is an abnormality in the right lower chest. Part of this has a  masslike configuration. It may be due to parenchymal parenchymal  disease, pleural disease, or both. A lung mass is possible. Further  evaluation with chest CT with contrast is recommended.  2. Question some minor opacity in the lateral left midlung which may be  due to pneumonia.     Electronically Signed By-Margarita Cooper MD On:10/5/2021 9:06 PM  This report was finalized on [id number] by  Margarita Cooper MD.            Results for orders placed during the hospital encounter of 10/05/21    XR Chest 1 View    Narrative  Examination: XR CHEST 1 VW-    Date of Exam:  10/5/2021 8:10 PM    Indication: Weakness.    Comparison: 05/20/2015    Technique: 1 view of the chest    FINDINGS:  The heart size is within normal. There is a lobulated opacity in the  inferior lateral right chest that measures about 5.5 cm; the lower  margin of this is obscured. There is some adjacent pleural thickening or  fluid in the inferior lateral right chest. These findings have developed  since the 2015 exam. There may be some minor patchy airspace opacity in  the lateral aspect of the left midlung. There is aortic calcification.  No significant bone abnormality.    Impression  1. There is an abnormality in the right lower chest. Part of this has a  masslike configuration. It may be due to parenchymal parenchymal  disease, pleural disease, or both. A lung mass is possible. Further  evaluation with chest CT with contrast is recommended.  2. Question some minor opacity in the lateral left midlung which may be  due to pneumonia.    Electronically Signed By-Margarita Cooper MD On:10/5/2021 9:06 PM  This report was finalized on [id number] by  Margarita Cooper MD.      Results for orders placed in visit on 04/14/21    SCANNED - IMAGING      Results for orders placed in visit on 03/05/21    SCANNED - IMAGING            ASSESSMENT / PLAN      Sepsis (HCC)    · Hypokalemia/hypomagnesemia-likley related to thiazides. we will replace.   · Hyponatremia--due to thiazides. Check urine osm/Na. Has lung mass, there could be underlying SIADH, exacerbated on thiazides. TSH is normal. Screen for paraproteins.  · Pneumonia  · Hypertension-his blood pressure was low admission hold meds.    · Right lung mass--may need biopsy/PET for further evaluation    Continue on NS, add KCl  Replace mag and potassium as needed  Check urine Na/osm  Monitor UOP, cr, lytes        I discussed the patients findings and my recommendations with patient, nursing staff and consulting provider    Will follow along closely. Thank you for allowing us to  see this patient in renal consultation.    [facility]  [phone]  MD Uche Ramires MD  10/06/21  14:06 EDT

## 2021-10-06 NOTE — CONSULTS
Hematology/Oncology Inpatient Consultation    Patient name: Prabhjot Roldan  : 1935  MRN: [id number]  Referring Provider:Herve Baez MD  Reason for Consultation: Right lung mass    Chief complaint:     History of present illness:    86 y.o. male presented to [facility] on 10/5/2021 with a complaint of altered mental status.  He was noted to be lethargic, hypotensive upon presentation.  Electrolyte abnormalities and community-acquired pneumonia is suspected and he was admitted.  Incidentally CT of the chest showed right lung mass lower lobe.    10/06/21  Hematology/Oncology was consulted for right lung mass.  His daughter was there.  Is having diminished appetite.  He is not able to eat due to lack of dentures since he lost left home.    Past Medical History:  #1 hypertension  2.  Hyperlipidemia  3.  Arthritis  Surgical History:  Hernia repair  Social History:  He was ex-smoker.  He quit smoking 40 years ago.  There is no history of alcohol abuse.  Family History:  1.  Mother had carcinoma.  She also suffered from diabetes mellitus.  2.  Father had prostate carcinoma.  He  from myocardial infarction.  Allergies:  No known allergies.    PCP: Herve Baez MD    History:  Past Medical History:   Diagnosis Date   • Arthritis    • Hyperlipidemia    • Hypertension    • Neck pain    ,   Past Surgical History:   Procedure Laterality Date   • HERNIA REPAIR     ,   Family History   Problem Relation Age of Onset   • Cancer Mother    • Diabetes Mother    • Cancer Father    • Prostate cancer Father    • Heart attack Father    ,   Social History     Tobacco Use   • Smoking status: Never Smoker   • Smokeless tobacco: Never Used   Vaping Use   • Vaping Use: Never used   Substance Use Topics   • Alcohol use: Never   • Drug use: Defer   , (Not in a hospital admission)  , Scheduled Meds:  aspirin, 81 mg, Oral, Daily  atorvastatin, 20 mg, Oral, Nightly  budesonide, 0.5 mg, Nebulization, BID - RT  cefepime,  "2 g, Intravenous, Q8H  dilTIAZem CD, 180 mg, Oral, Q24H  enoxaparin, 40 mg, Subcutaneous, Daily  ipratropium-albuterol, 3 mL, Nebulization, 4x Daily - RT  [START ON 10/7/2021] vancomycin, 1,500 mg, Intravenous, Q24H    , Continuous Infusions:  Pharmacy to dose vancomycin,   custom IV infusion builder, , Last Rate: 75 mL/hr at 10/06/21 1418    , PRN Meds:  HYDROcodone-acetaminophen  •  magnesium sulfate **OR** magnesium sulfate **OR** magnesium sulfate  •  Pharmacy to dose vancomycin  •  potassium chloride **OR** potassium chloride **OR** potassium chloride  •  [COMPLETED] Insert peripheral IV **AND** sodium chloride  •  tiZANidine   Allergies:  Patient has no known allergies.    ROS:  Review of Systems   Constitutional: Positive for activity change and appetite change.   Respiratory: Positive for cough. Negative for shortness of breath.    Cardiovascular: Negative for chest pain and leg swelling.   Endocrine: Negative for polydipsia, polyphagia and polyuria.   Genitourinary: Negative for flank pain, frequency and hematuria.   Musculoskeletal: Positive for arthralgias and neck pain. Negative for back pain and joint swelling.   Skin: Negative for rash.   Neurological: Negative for dizziness and speech difficulty.   Psychiatric/Behavioral: Positive for confusion. Negative for hallucinations and suicidal ideas.        Objective     Vital Signs:   /74   Pulse 98   Temp 95.4 °F (35.2 °C) (Rectal)   Resp 16   Ht 175.3 cm (69\")   Wt 86.2 kg (190 lb)   SpO2 94%   BMI 28.06 kg/m²     Physical Exam:  Physical Exam  Vitals and nursing note reviewed.   Constitutional:       General: He is not in acute distress.     Appearance: Normal appearance. He is well-developed. He is not diaphoretic.   HENT:      Head: Normocephalic and atraumatic.      Nose: Nose normal.      Mouth/Throat:      Mouth: Mucous membranes are moist.      Pharynx: Oropharynx is clear. No oropharyngeal exudate or posterior oropharyngeal erythema. "   Eyes:      General: No scleral icterus.     Extraocular Movements: Extraocular movements intact.      Conjunctiva/sclera: Conjunctivae normal.      Pupils: Pupils are equal, round, and reactive to light.   Cardiovascular:      Rate and Rhythm: Normal rate. Rhythm irregular.      Heart sounds: Normal heart sounds. No murmur heard.     Pulmonary:      Effort: Pulmonary effort is normal. No respiratory distress.      Breath sounds: Normal breath sounds. No wheezing or rales.   Abdominal:      General: Bowel sounds are normal. There is no distension.      Palpations: Abdomen is soft. There is no mass.      Tenderness: There is abdominal tenderness (Tenderness on the right upper quadrant). There is no guarding.   Genitourinary:     Comments: Deferred   Musculoskeletal:         General: No swelling, tenderness or deformity. Normal range of motion.      Cervical back: Normal range of motion and neck supple.      Right lower leg: No edema.      Left lower leg: No edema.   Lymphadenopathy:      Cervical: No cervical adenopathy.      Upper Body:      Right upper body: No supraclavicular adenopathy.      Left upper body: No supraclavicular adenopathy.   Skin:     General: Skin is warm and dry.      Coloration: Skin is not pale.      Findings: No bruising, erythema or rash.   Neurological:      General: No focal deficit present.      Mental Status: He is alert and oriented to person, place, and time.      Coordination: Coordination normal.   Psychiatric:         Mood and Affect: Mood normal.         Behavior: Behavior normal.          Results Review:  Lab Results (last 48 hours)     Procedure Component Value Units Date/Time    MRSA Screen, PCR (Inpatient) - Swab, Nares [[id number]] Collected: 10/06/21 1600    Specimen: Swab from Nares Updated: 10/06/21 1608    Urine Culture - Urine, Urine, Catheter [[id number]]  (Normal) Collected: 10/05/21 1957    Specimen: Urine, Catheter Updated: 10/06/21 1148     Urine Culture Culture in  progress    Comprehensive Metabolic Panel [[id number]]  (Abnormal) Collected: 10/06/21 0906    Specimen: Blood Updated: 10/06/21 0932     Glucose 101 mg/dL      BUN 14 mg/dL      Creatinine 0.67 mg/dL      Sodium 123 mmol/L      Potassium 3.1 mmol/L      Chloride 82 mmol/L      CO2 25.0 mmol/L      Calcium 8.6 mg/dL      Total Protein 7.1 g/dL      Albumin 3.00 g/dL      ALT (SGPT) 177 U/L      AST (SGOT) 260 U/L      Alkaline Phosphatase 88 U/L      Total Bilirubin 0.8 mg/dL      eGFR Non African Amer 113 mL/min/1.73      Globulin 4.1 gm/dL      A/G Ratio 0.7 g/dL      BUN/Creatinine Ratio 20.9     Anion Gap 16.0 mmol/L     Narrative:      GFR Normal >60  Chronic Kidney Disease <60  Kidney Failure <15      Magnesium [[id number]]  (Abnormal) Collected: 10/06/21 0906    Specimen: Blood Updated: 10/06/21 0932     Magnesium 1.5 mg/dL     CBC & Differential [[id number]]  (Abnormal) Collected: 10/06/21 0906    Specimen: Blood Updated: 10/06/21 0920    Narrative:      The following orders were created for panel order CBC & Differential.  Procedure                               Abnormality         Status                     ---------                               -----------         ------                     CBC Auto Differential[[id number]]        Abnormal            Final result                 Please view results for these tests on the individual orders.    CBC Auto Differential [[id number]]  (Abnormal) Collected: 10/06/21 0906    Specimen: Blood Updated: 10/06/21 0920     WBC 19.60 10*3/mm3      RBC 4.27 10*6/mm3      Hemoglobin 12.6 g/dL      Hematocrit 36.9 %      MCV 86.4 fL      MCH 29.5 pg      MCHC 34.1 g/dL      RDW 14.4 %      RDW-SD 43.3 fl      MPV 7.3 fL      Platelets 316 10*3/mm3      Neutrophil % 79.7 %      Lymphocyte % 10.5 %      Monocyte % 8.8 %      Eosinophil % 0.2 %      Basophil % 0.8 %      Neutrophils, Absolute 15.70 10*3/mm3      Lymphocytes, Absolute 2.10 10*3/mm3      Monocytes, Absolute 1.70  10*3/mm3      Eosinophils, Absolute 0.00 10*3/mm3      Basophils, Absolute 0.10 10*3/mm3      nRBC 0.2 /100 WBC     STAT Lactic Acid, Reflex [[id number]]  (Abnormal) Collected: 10/05/21 2035    Specimen: Blood Updated: 10/05/21 2100     Lactate 4.1 mmol/L     COVID-19,CEPHEID/DIANE/BDMAX,COR/NIMO/PAD/LEWIS IN-HOUSE(OR EMERGENT/ADD-ON),NP SWAB IN TRANSPORT MEDIA 3-4 HR TAT, RT-PCR - Swab, Nasopharynx [[id number]]  (Normal) Collected: 10/05/21 1953    Specimen: Swab from Nasopharynx Updated: 10/05/21 2050     COVID19 Not Detected    Narrative:      Fact sheet for providers: https://www.fda.gov/media/819773/download     Fact sheet for patients: https://www.fda.gov/media/606823/download  Fact sheet for providers: https://www.fda.gov/media/948546/download    Fact sheet for patients: https://www.fda.gov/media/587712/download    Test performed by PCR.    Troponin [[id number]]  (Normal) Collected: 10/05/21 1951    Specimen: Blood from Arm, Left Updated: 10/05/21 2034     Troponin T <0.010 ng/mL     Narrative:      Troponin T Reference Range:  <= 0.03 ng/mL-   Negative for AMI  >0.03 ng/mL-     Abnormal for myocardial necrosis.  Clinicians would have to utilize clinical acumen, EKG, Troponin and serial changes to determine if it is an Acute Myocardial Infarction or myocardial injury due to an underlying chronic condition.       Results may be falsely decreased if patient taking Biotin.      TSH [[id number]]  (Normal) Collected: 10/05/21 1951    Specimen: Blood from Arm, Left Updated: 10/05/21 2034     TSH 1.420 uIU/mL     Comprehensive Metabolic Panel [[id number]]  (Abnormal) Collected: 10/05/21 1951    Specimen: Blood from Arm, Left Updated: 10/05/21 2032     Glucose 178 mg/dL      BUN 16 mg/dL      Creatinine 0.96 mg/dL      Sodium 120 mmol/L      Potassium 2.5 mmol/L      Chloride 75 mmol/L      CO2 21.0 mmol/L      Calcium 8.9 mg/dL      Total Protein 7.7 g/dL      Albumin 3.30 g/dL      ALT (SGPT) 92 U/L      AST (SGOT) 155 U/L       Alkaline Phosphatase 83 U/L      Total Bilirubin 0.6 mg/dL      eGFR Non African Amer 75 mL/min/1.73      Globulin 4.4 gm/dL      A/G Ratio 0.8 g/dL      BUN/Creatinine Ratio 16.7     Anion Gap 24.0 mmol/L     Narrative:      GFR Normal >60  Chronic Kidney Disease <60  Kidney Failure <15      Lipase [[id number]]  (Normal) Collected: 10/05/21 1951    Specimen: Blood from Arm, Left Updated: 10/05/21 2030     Lipase 47 U/L     CK [[id number]]  (Normal) Collected: 10/05/21 1951    Specimen: Blood from Arm, Left Updated: 10/05/21 2030     Creatine Kinase 97 U/L     Magnesium [[id number]]  (Abnormal) Collected: 10/05/21 1951    Specimen: Blood from Arm, Left Updated: 10/05/21 2030     Magnesium 1.5 mg/dL     Urinalysis, Microscopic Only - Urine, Catheter [[id number]]  (Abnormal) Collected: 10/05/21 1957    Specimen: Urine, Catheter Updated: 10/05/21 2026     RBC, UA 0-2 /HPF      WBC, UA 31-50 /HPF      Bacteria, UA None Seen /HPF      Squamous Epithelial Cells, UA 7-12 /HPF      Hyaline Casts, UA 0-2 /LPF      Methodology Manual Light Microscopy    Extra Tubes [[id number]] Collected: 10/05/21 1951    Specimen: Blood, Venous Line Updated: 10/05/21 2023    Narrative:      The following orders were created for panel order Extra Tubes.  Procedure                               Abnormality         Status                     ---------                               -----------         ------                     Gold Top - SST[[id number]]                                   Final result               Green Top (Gel)[[id number]]                                  Final result                 Please view results for these tests on the individual orders.    Gold Top - SST [[id number]] Collected: 10/05/21 1951    Specimen: Blood Updated: 10/05/21 2023    Green Top (Gel) [[id number]] Collected: 10/05/21 1951    Specimen: Blood Updated: 10/05/21 2022    Urinalysis With Culture If Indicated - Urine, Catheter [[id number]]  (Abnormal) Collected:  10/05/21 1957    Specimen: Urine, Catheter Updated: 10/05/21 2021     Color, UA Yellow     Appearance, UA Cloudy     Comment: Result checked         pH, UA 5.5     Specific Gravity, UA 1.023     Glucose, UA Negative     Ketones, UA Negative     Bilirubin, UA Negative     Blood, UA Small (1+)     Protein, UA Negative     Leuk Esterase, UA Negative     Nitrite, UA Negative     Urobilinogen, UA 0.2 E.U./dL    aPTT [[id number]]  (Normal) Collected: 10/05/21 1951    Specimen: Blood from Arm, Left Updated: 10/05/21 2018     PTT 30.8 seconds     Protime-INR [[id number]]  (Abnormal) Collected: 10/05/21 1951    Specimen: Blood from Arm, Left Updated: 10/05/21 2018     Protime 12.9 Seconds      INR 1.18    CBC & Differential [[id number]]  (Abnormal) Collected: 10/05/21 1951    Specimen: Blood from Arm, Left Updated: 10/05/21 2007    Narrative:      The following orders were created for panel order CBC & Differential.  Procedure                               Abnormality         Status                     ---------                               -----------         ------                     CBC Auto Differential[[id number]]        Abnormal            Final result                 Please view results for these tests on the individual orders.    CBC Auto Differential [[id number]]  (Abnormal) Collected: 10/05/21 1951    Specimen: Blood from Arm, Left Updated: 10/05/21 2007     WBC 14.20 10*3/mm3      RBC 3.92 10*6/mm3      Hemoglobin 11.7 g/dL      Hematocrit 33.8 %      MCV 86.3 fL      MCH 29.9 pg      MCHC 34.6 g/dL      RDW 14.2 %      RDW-SD 42.4 fl      MPV 7.2 fL      Platelets 392 10*3/mm3      Neutrophil % 73.0 %      Lymphocyte % 12.7 %      Monocyte % 13.4 %      Eosinophil % 0.3 %      Basophil % 0.6 %      Neutrophils, Absolute 10.40 10*3/mm3      Lymphocytes, Absolute 1.80 10*3/mm3      Monocytes, Absolute 1.90 10*3/mm3      Eosinophils, Absolute 0.00 10*3/mm3      Basophils, Absolute 0.10 10*3/mm3      nRBC 0.1 /100 WBC      Blood Culture - Blood, Arm, Right [[id number]] Collected: 10/05/21 2001    Specimen: Blood from Arm, Right Updated: 10/05/21 2006    POC Lactate [[id number]]  (Abnormal) Collected: 10/05/21 2001    Specimen: Blood Updated: 10/05/21 2002     Lactate 4.0 mmol/L      Comment: Serial Number: [id number]:  [id number]       Blood Culture - Blood, Arm, Left [[id number]] Collected: 10/05/21 1951    Specimen: Blood from Arm, Left Updated: 10/05/21 2001           Pending Results:     Imaging Reviewed:   CT Head Without Contrast    Result Date: 10/5/2021  No acute intracranial abnormality. Electronically signed by:  Lenny Mckee  10/5/2021 9:37 PM    CT Chest With Contrast Diagnostic    Result Date: 10/5/2021  1. There is a large low-density mass in the right lung base measuring 9.5 x 5.3 cm. This is favored to represent a neoplastic process which must be considered and excluded. This lesion would be amenable to CT-guided biopsy as determined clinically. Alternatively, a PET/CT study could be obtained for better characterization. Follow-up is recommended. 2. Reticulonodular type infiltrates in the right upper lobe with additional nodular type groundglass and confluent groundglass airspace opacities in the left upper lobe and lingula. Findings are most likely related to infectious bronchopneumonia, possibly related to viral pneumonia and/or aspiration. COVID pneumonia should be considered. 3. Element of interlobular septal thickening may represent early pulmonary edema. 4. Emphysema. 5. Advanced coronary artery calcifications. 6. Enlarged subcarinal lymph node measuring 1.8 cm in short axis. Metastatic disease must be excluded. 7. Hepatic steatosis. The liver also demonstrates a very subtle micronodular contour which may represent liver disease or early cirrhosis. Correlation with hepatic function is recommended. 8. Diverticulosis. 9. Severe atherosclerotic disease. 10. Additional incidental and chronic findings as  above. *[employer] GUIDELINES: Low risk patient: <6mm - Low Risk, no further f/u >6-8mm - low dose CT 6-12 mo, then 2nd f/u CT at 18-24mo >8mm - low dose CT 3,9,24mo OR PET/CT OR Biopsy High Risk Patient: <6mm - optional CT at 12 mo >6-8mm - low dose CT 6-12 mo, then 2nd f/u CT at 18-24mo >8mm - low dose CT at 3 mo, OR PET/CT OR Biopsy MULTIPLE NODULES: Low Risk Patient: <6mm - Low Risk, no further f/u >6-8mm - low dose CT 3-6 mo, then 2nd f/u CT at 18-24mo >8mm - low dose CT 3-6 mo, then 2nd f/u CT at 18-24mo High Risk Patient: <6mm - High risk, multiple nodules, optional CT at 12 mo >6-8mm - low dose CT 3-6 mo, then 2nd f/u CT at 18-24mo >8mm - low dose CT 3-6 mo, then 2nd f/u CT at 18-24mo Slot 67 Electronically signed by:  Arcenio Vanegas M.D.  10/5/2021 9:30 PM    CT Abdomen Pelvis With Contrast    Result Date: 10/5/2021  1. There is a large low-density mass in the right lung base measuring 9.5 x 5.3 cm. This is favored to represent a neoplastic process which must be considered and excluded. This lesion would be amenable to CT-guided biopsy as determined clinically. Alternatively, a PET/CT study could be obtained for better characterization. Follow-up is recommended. 2. Reticulonodular type infiltrates in the right upper lobe with additional nodular type groundglass and confluent groundglass airspace opacities in the left upper lobe and lingula. Findings are most likely related to infectious bronchopneumonia, possibly related to viral pneumonia and/or aspiration. COVID pneumonia should be considered. 3. Element of interlobular septal thickening may represent early pulmonary edema. 4. Emphysema. 5. Advanced coronary artery calcifications. 6. Enlarged subcarinal lymph node measuring 1.8 cm in short axis. Metastatic disease must be excluded. 7. Hepatic steatosis. The liver also demonstrates a very subtle micronodular contour which may represent liver disease or early cirrhosis. Correlation with hepatic  function is recommended. 8. Diverticulosis. 9. Severe atherosclerotic disease. 10. Additional incidental and chronic findings as above. *[employer] GUIDELINES: Low risk patient: <6mm - Low Risk, no further f/u >6-8mm - low dose CT 6-12 mo, then 2nd f/u CT at 18-24mo >8mm - low dose CT 3,9,24mo OR PET/CT OR Biopsy High Risk Patient: <6mm - optional CT at 12 mo >6-8mm - low dose CT 6-12 mo, then 2nd f/u CT at 18-24mo >8mm - low dose CT at 3 mo, OR PET/CT OR Biopsy MULTIPLE NODULES: Low Risk Patient: <6mm - Low Risk, no further f/u >6-8mm - low dose CT 3-6 mo, then 2nd f/u CT at 18-24mo >8mm - low dose CT 3-6 mo, then 2nd f/u CT at 18-24mo High Risk Patient: <6mm - High risk, multiple nodules, optional CT at 12 mo >6-8mm - low dose CT 3-6 mo, then 2nd f/u CT at 18-24mo >8mm - low dose CT 3-6 mo, then 2nd f/u CT at 18-24mo Slot 67 Electronically signed by:  Arcenio Vanegas M.D.  10/5/2021 9:30 PM    XR Chest 1 View    Result Date: 10/5/2021  1. There is an abnormality in the right lower chest. Part of this has a masslike configuration. It may be due to parenchymal parenchymal disease, pleural disease, or both. A lung mass is possible. Further evaluation with chest CT with contrast is recommended. 2. Question some minor opacity in the lateral left midlung which may be due to pneumonia.  Electronically Signed By-Margarita Cooper MD On:10/5/2021 9:06 PM This report was finalized on [id number] by  Margarita Cooper MD.      I have reviewed the patient's labs, imaging, reports, and other clinician documentation.         Assessment/Plan       ASSESSMENT  1. This 86-year-old  male is seen with altered mental status.  CT of the chest showed reticulonodular infiltrate in the right upper lobe and left upper lobe suspicious of bronchopneumonia.  Incidentally there is a large low-density 9.5 cm x 5.3 cm at the right lung base.  This is suspicious for malignancy.  He is currently getting antibiotics for suspected  bronchopneumonia.  Meanwhile pulmonary consultation is awaited.  He may need FNA biopsy of this hypodensity lesion differential diagnosis include loculated parapneumonic effusion with elevated white cell count and elevated lactate.    PLAN  1. I will await pulmonary evaluation and his input at this time.  2. 2.  Continue antibiotics.          I discussed the patients findings and my recommendations with patient and family.    Thank you for this consult.  We will be happy to follow along in the care of this patient.

## 2021-10-06 NOTE — ED PROVIDER NOTES
Subjective   Chief complaint general weakness fatigue    History presents 86-year-old male who was found by the family sitting in the recliner seem to be weak lethargic and pale.  Patient was transported to hospital by EMS hypotensive.  The patient just complains of general weakness he denies any fever chills he said some cough he said no vomiting or diarrhea no black or bloody stool some constipation noted complains of generalized pain all over which is moderate degree nothing makes better or worse continuous today.  Denies any recent change in medications no tick bites or rashes no shortness of breath he had his Covid vaccinations no dysuria frequency and he is making urine.  No one in the home with similar illness.  Note chest pain neck arm or jaw pain no headache vision change speech difficulty or paralysis.  Symptoms have been severe today.  No other associated symptoms          Review of Systems   Constitutional: Positive for fatigue. Negative for chills and fever.   HENT: Negative for congestion and sinus pressure.    Eyes: Negative for photophobia and visual disturbance.   Respiratory: Negative for chest tightness and shortness of breath.    Cardiovascular: Negative for chest pain and palpitations.   Gastrointestinal: Negative for abdominal pain and vomiting.   Endocrine: Negative for cold intolerance and heat intolerance.   Genitourinary: Negative for difficulty urinating and dysuria.   Musculoskeletal: Positive for arthralgias and myalgias.   Skin: Negative for color change and rash.   Neurological: Positive for weakness. Negative for dizziness, facial asymmetry, speech difficulty, light-headedness and headaches.   Psychiatric/Behavioral: Negative for agitation and behavioral problems.       Past Medical History:   Diagnosis Date   • Arthritis    • Hyperlipidemia    • Hypertension    • Neck pain        No Known Allergies    Past Surgical History:   Procedure Laterality Date   • HERNIA REPAIR         Family  History   Problem Relation Age of Onset   • Cancer Mother    • Diabetes Mother    • Cancer Father    • Prostate cancer Father    • Heart attack Father        Social History     Socioeconomic History   • Marital status:      Spouse name: Not on file   • Number of children: Not on file   • Years of education: Not on file   • Highest education level: Not on file   Tobacco Use   • Smoking status: Never Smoker   • Smokeless tobacco: Never Used   Vaping Use   • Vaping Use: Never used   Substance and Sexual Activity   • Alcohol use: Never   • Drug use: Defer   • Sexual activity: Defer     Prior to Admission medications    Medication Sig Start Date End Date Taking? Authorizing Provider   amLODIPine (NORVASC) 10 MG tablet  1/18/21   Emergency, Nurse Epic, RN   aspirin (Adult Aspirin EC Low Strength) 81 MG EC tablet ADULT ASPIRIN EC LOW STRENGTH 81 MG ORAL TABLET DELAYED RELEASE 3/21/12   Emergency, Nurse Chaya RN   dilTIAZem CD (CARDIZEM CD) 240 MG 24 hr capsule  12/7/20   Emergency, Nurse Chaya RN   hydrALAZINE (APRESOLINE) 10 MG tablet  6/9/21   ProviderZenaida MD   hydroCHLOROthiazide (HYDRODIURIL) 25 MG tablet  9/24/21   ProviderZenaida MD   HYDROcodone-acetaminophen (NORCO) 5-325 MG per tablet Take 1 tablet by mouth Daily As Needed for Severe Pain  for up to 7 days. 9/27/21 10/4/21  Chris Mccarthy DO   HYDROcodone-acetaminophen (NORCO) 5-325 MG per tablet Take 1 tablet by mouth Daily As Needed for Severe Pain  for up to 30 days. 10/3/21 11/2/21  Chris Mccarthy DO   pravastatin (PRAVACHOL) 80 MG tablet  1/18/21   Emergency, Nurse Chaya RN   tiZANidine (ZANAFLEX) 4 MG tablet Take 1 tablet by mouth At Night As Needed for Muscle Spasms for up to 30 days. 9/27/21 10/27/21  Chris Mccarthy DO             Objective   Physical Exam  86-year-old pale appearing lethargic blood pressure of 67/44.  Ill-appearing  HEENT extraocular muscles are intact pupils equal round react there is no photophobia mouth is clear but  dry neck supple no adenopathy no JVD no bruits no meningeal signs lungs rhonchi throughout no retractions heart regular with large systolic murmur no rub abdomen soft nontender good bowel sounds no peritoneal findings or pulsatile mass or bruits rectal exam no masses tenderness brown stool heme-negative extremities pulses are equal throughout upper lower extremities cool to touch no palpable cords or Homans' sign or evidence of DVT.  Neurologic patient sleepy but arousable no facial asymmetry normal speech he is orientated x3 no drift the arms or legs there is no focal weakness.  There is generalized weakness.  Skin cool clammy.  Procedures           ED Course      Results for orders placed or performed during the hospital encounter of 10/05/21   COVID-19,CEPHEID/DIANE/BDMAX,COR/NIMO/PAD/LEWIS IN-HOUSE(OR EMERGENT/ADD-ON),NP SWAB IN TRANSPORT MEDIA 3-4 HR TAT, RT-PCR - Swab, Nasopharynx    Specimen: Nasopharynx; Swab   Result Value Ref Range    COVID19 Not Detected Not Detected - Ref. Range   Comprehensive Metabolic Panel    Specimen: Arm, Left; Blood   Result Value Ref Range    Glucose 178 (H) 65 - 99 mg/dL    BUN 16 8 - 23 mg/dL    Creatinine 0.96 0.76 - 1.27 mg/dL    Sodium 120 (L) 136 - 145 mmol/L    Potassium 2.5 (C) 3.5 - 5.2 mmol/L    Chloride 75 (L) 98 - 107 mmol/L    CO2 21.0 (L) 22.0 - 29.0 mmol/L    Calcium 8.9 8.6 - 10.5 mg/dL    Total Protein 7.7 6.0 - 8.5 g/dL    Albumin 3.30 (L) 3.50 - 5.20 g/dL    ALT (SGPT) 92 (H) 1 - 41 U/L    AST (SGOT) 155 (H) 1 - 40 U/L    Alkaline Phosphatase 83 39 - 117 U/L    Total Bilirubin 0.6 0.0 - 1.2 mg/dL    eGFR Non African Amer 75 >60 mL/min/1.73    Globulin 4.4 gm/dL    A/G Ratio 0.8 g/dL    BUN/Creatinine Ratio 16.7 7.0 - 25.0    Anion Gap 24.0 (H) 5.0 - 15.0 mmol/L   Protime-INR    Specimen: Arm, Left; Blood   Result Value Ref Range    Protime 12.9 (H) 9.6 - 11.7 Seconds    INR 1.18 (H) 0.93 - 1.10   aPTT    Specimen: Arm, Left; Blood   Result Value Ref Range    PTT  30.8 24.0 - 31.0 seconds   Lipase    Specimen: Arm, Left; Blood   Result Value Ref Range    Lipase 47 13 - 60 U/L   Urinalysis With Culture If Indicated - Urine, Catheter    Specimen: Urine, Catheter   Result Value Ref Range    Color, UA Yellow Yellow, Straw    Appearance, UA Cloudy (A) Clear    pH, UA 5.5 5.0 - 8.0    Specific Gravity, UA 1.023 1.005 - 1.030    Glucose, UA Negative Negative    Ketones, UA Negative Negative    Bilirubin, UA Negative Negative    Blood, UA Small (1+) (A) Negative    Protein, UA Negative Negative    Leuk Esterase, UA Negative Negative    Nitrite, UA Negative Negative    Urobilinogen, UA 0.2 E.U./dL 0.2 - 1.0 E.U./dL   Troponin    Specimen: Arm, Left; Blood   Result Value Ref Range    Troponin T <0.010 0.000 - 0.030 ng/mL   CK    Specimen: Arm, Left; Blood   Result Value Ref Range    Creatine Kinase 97 20 - 200 U/L   Magnesium    Specimen: Arm, Left; Blood   Result Value Ref Range    Magnesium 1.5 (L) 1.6 - 2.4 mg/dL   TSH    Specimen: Arm, Left; Blood   Result Value Ref Range    TSH 1.420 0.270 - 4.200 uIU/mL   CBC Auto Differential    Specimen: Arm, Left; Blood   Result Value Ref Range    WBC 14.20 (H) 3.40 - 10.80 10*3/mm3    RBC 3.92 (L) 4.14 - 5.80 10*6/mm3    Hemoglobin 11.7 (L) 13.0 - 17.7 g/dL    Hematocrit 33.8 (L) 37.5 - 51.0 %    MCV 86.3 79.0 - 97.0 fL    MCH 29.9 26.6 - 33.0 pg    MCHC 34.6 31.5 - 35.7 g/dL    RDW 14.2 12.3 - 15.4 %    RDW-SD 42.4 37.0 - 54.0 fl    MPV 7.2 6.0 - 12.0 fL    Platelets 392 140 - 450 10*3/mm3    Neutrophil % 73.0 42.7 - 76.0 %    Lymphocyte % 12.7 (L) 19.6 - 45.3 %    Monocyte % 13.4 (H) 5.0 - 12.0 %    Eosinophil % 0.3 0.3 - 6.2 %    Basophil % 0.6 0.0 - 1.5 %    Neutrophils, Absolute 10.40 (H) 1.70 - 7.00 10*3/mm3    Lymphocytes, Absolute 1.80 0.70 - 3.10 10*3/mm3    Monocytes, Absolute 1.90 (H) 0.10 - 0.90 10*3/mm3    Eosinophils, Absolute 0.00 0.00 - 0.40 10*3/mm3    Basophils, Absolute 0.10 0.00 - 0.20 10*3/mm3    nRBC 0.1 0.0 - 0.2 /100  WBC   STAT Lactic Acid, Reflex    Specimen: Blood   Result Value Ref Range    Lactate 4.1 (C) 0.5 - 2.0 mmol/L   Urinalysis, Microscopic Only - Urine, Catheter    Specimen: Urine, Catheter   Result Value Ref Range    RBC, UA 0-2 (A) None Seen /HPF    WBC, UA 31-50 (A) None Seen /HPF    Bacteria, UA None Seen None Seen /HPF    Squamous Epithelial Cells, UA 7-12 (A) None Seen, 0-2 /HPF    Hyaline Casts, UA 0-2 None Seen /LPF    Methodology Manual Light Microscopy    POC Lactate    Specimen: Blood   Result Value Ref Range    Lactate 4.0 (C) 0.5 - 2.0 mmol/L   ECG 12 Lead   Result Value Ref Range    QT Interval 401 ms     XR Chest 1 View    Result Date: 10/5/2021  1. There is an abnormality in the right lower chest. Part of this has a masslike configuration. It may be due to parenchymal parenchymal disease, pleural disease, or both. A lung mass is possible. Further evaluation with chest CT with contrast is recommended. 2. Question some minor opacity in the lateral left midlung which may be due to pneumonia.  Electronically Signed By-Margarita Cooper MD On:10/5/2021 9:06 PM This report was finalized on [id number] by  Margarita Cooper MD.    Medications   sodium chloride 0.9 % flush 10 mL (has no administration in time range)   vancomycin IVPB 1750 mg in 0.9% Sodium Chloride (premix) 500 mL (has no administration in time range)   potassium chloride (K-DUR,KLOR-CON) CR tablet 40 mEq (has no administration in time range)   lactated ringers bolus 2,586 mL (2,586 mL Intravenous New Bag 10/5/21 1952)   cefepime 2 gm IVPB in 100 ml NS (MBP) (0 g Intravenous Stopped 10/5/21 2034)   iopamidol (ISOVUE-370) 76 % injection 100 mL (100 mL Intravenous Given 10/5/21 2253)     SEPTIC SHOCK FOCUSED EXAM ATTESTATION    I attest that I have reassessed tissue perfusion after the fluid bolus given.    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  Patient also was noted to have reticulonodular infiltrates in the right upper lobe and additional nodular groundglass in conflict on gradual bases in the left lobe and lingula consistent with pneumonia.  Emphysema advanced coronary artery calcifications enlarged subcarinal lymph node measuring 1.8 cm fatty liver diverticulosis.                     CT head without without acute findings        MDM  Number of Diagnoses or Management Options  Diagnosis management comments: Medical decision making.  Patient had IV established placed on a cardiac monitor and had the above exam evaluation he triggered sepsis his temperature was 95.4 blood pressure 67/44 start lactated Ringer's 30 mill per kilo bolus.  Labs cultures lactate all sent.  He was started on cefepime 2 g IV.  EKG was a sinus rhythm without acute ST elevation or acute changes on my review lactate was 4.1 white count was 14.2 hemoglobin 11.7 mag was 1.5 the patient had a CK of 97 troponin was negative urine had 50 white cells but otherwise negative that was cultured blood cultures pending sodium was low at 120.  Potassium 3.5 the CO2 was 21 ALT was 92 AST of 155.  Potassium 2.5 blood sugar 178.  Patient repeat exam is feeling much better his blood pressure now was up to 148/84 after fluids has been given cefepime and vancomycin.  The patient's chest x-ray showed a mass in the right lung base but his CT scan is consistent with a mass in that right lung base but some bilateral pneumonia.  No other acute findings head CT without was unremarkable.  The patient was made aware of the findings.  His blood pressure is improved his lactate is 4.1 on initial he is mentating much better.   Good and equal pulses throughout lungs are clear heart is regular with harsh systolic murmur he is awake alert follows commands he is no longer lethargic.  Good cap refill good skin turgor skin is warm and dry.  Dr. Guerrero paged the patient will be admitted to hospital for further care stable improved course critical care 30 minutes       Amount and/or Complexity of Data Reviewed  Clinical lab tests: reviewed  Tests in the radiology section of CPT®: reviewed  Discuss the patient with other providers: yes  Independent visualization of images, tracings, or specimens: yes    Risk of Complications, Morbidity, and/or Mortality  Presenting problems: high  Diagnostic procedures: high  Management options: high    Critical Care  Total time providing critical care: 30-74 minutes    Patient Progress  Patient progress: stable      Final diagnoses:   Sepsis, due to unspecified organism, unspecified whether acute organ dysfunction present (HCC)   Ammonia dermatitis   Pneumonia of both lungs due to infectious organism, unspecified part of lung   Hyponatremia   Hypokalemia   Weakness   Lung mass       ED Disposition  ED Disposition     ED Disposition Condition Comment    Decision to Admit  Level of Care: Telemetry [5]   Admitting Physician: ELISEO GUERRERO [[id number]]   Attending Physician: ELISEO GUERRERO [[id number]]   Bed Request Comments: pcu            No follow-up provider specified.       Medication List      ASK your doctor about these medications    HYDROcodone-acetaminophen 5-325 MG per tablet  Commonly known as: NORCO  Take 1 tablet by mouth Daily As Needed for Severe Pain  for up to 7 days.  Ask about: Should I take this medication?             Aly Mccurdy MD  10/05/21 9383

## 2021-10-06 NOTE — PROGRESS NOTES
"Pharmacy Antimicrobial Dosing Service    Subjective:  Prabhjot Roldan is a 86 y.o.male admitted with Sepsis. Pharmacy has been consulted to dose Vancomycin for possible pneumonia.        Assessment/Plan    1. Day #1 Vancomycin: Goal -600 mcg*h/mL. Vancomycin 1750mg given x1 load in ED.  Continue with vancomycin 1500mg IV q24h.  Obtain random 10/7 at 0900.  Will adjust dose based on patient specific parameters.    2. Day #1 Cefepime: 2gm IV q8h for estCrCl > 60 mL/min.    Will continue to monitor drug levels, renal function, culture and sensitivities, and patient clinical status.       Objective:  Relevant clinical data and objective history reviewed:  175.3 cm (69\")   86.2 kg (190 lb)   Ideal body weight: 70.7 kg (155 lb 13.8 oz)  Adjusted ideal body weight: 76.9 kg (169 lb 8.3 oz)  Body mass index is 28.06 kg/m².        Results from last 7 days   Lab Units 10/05/21  1951   CREATININE mg/dL 0.96     Estimated Creatinine Clearance: 62.3 mL/min (by C-G formula based on SCr of 0.96 mg/dL).  No intake/output data recorded.    Results from last 7 days   Lab Units 10/05/21  1951   WBC 10*3/mm3 14.20*     Temperature    10/05/21 1935   Temp: 95.4 °F (35.2 °C)     Baseline culture/source/susceptibility:  Microbiology Results (last 10 days)       Procedure Component Value - Date/Time    COVID-19,CEPHEID/DIANE/BDMAX,COR/NIMO/PAD/LEWIS IN-HOUSE(OR EMERGENT/ADD-ON),NP SWAB IN TRANSPORT MEDIA 3-4 HR TAT, RT-PCR - Swab, Nasopharynx [[id number]]  (Normal) Collected: 10/05/21 1953    Lab Status: Final result Specimen: Swab from Nasopharynx Updated: 10/05/21 2050     COVID19 Not Detected    Narrative:      Fact sheet for providers: https://www.fda.gov/media/662679/download     Fact sheet for patients: https://www.fda.gov/media/304213/download  Fact sheet for providers: https://www.fda.gov/media/827368/download    Fact sheet for patients: https://www.fda.gov/media/389618/download    Test performed by PCR.            Anti-Infectives " (From admission, onward)      Ordered     Dose/Rate Route Frequency Start Stop    10/06/21 0807  vancomycin 1500 mg/500 mL 0.9% NS IVPB ([facility])     Ordering Provider: Herve Baez MD    1,500 mg Intravenous Every 24 Hours 10/07/21 0000 10/10/21 2359    10/06/21 0755  cefepime 2 gm IVPB in 100 ml NS (MBP)     Ordering Provider: Herve Baez MD    2 g  25 mL/hr over 4 Hours Intravenous Every 8 Hours 10/06/21 0800 10/11/21 0759    10/06/21 0755  Pharmacy to dose vancomycin     Ordering Provider: Herve Baez MD     Does not apply Continuous PRN 10/06/21 0755 10/11/21 0754    10/05/21 2342  vancomycin IVPB 1750 mg in 0.9% Sodium Chloride (premix) 500 mL     Ordering Provider: Aly Mccurdy MD    20 mg/kg × 86.2 kg  over 105 Minutes Intravenous Once 10/05/21 2343 10/06/21 0538    10/05/21 1940  cefepime 2 gm IVPB in 100 ml NS (MBP)     Ordering Provider: Aly Mccurdy MD    2 g  over 30 Minutes Intravenous Once 10/05/21 1942 10/05/21 2034            Wesly Boyer PharmD  10/06/21 08:08 EDT

## 2021-10-06 NOTE — H&P
Patient Care Team:  Herve Baez MD as PCP - General (Family Medicine)    Chief Conplaint  Subjective     The patient is a 86 y.o. male presents with acute mental status changes with lethargy, hypotension and electrolyte disturbance with evidence of a community-acquired pneumonia present upon admission and an acute lung mass    HPI  The patient was found on the day of presentation to be lethargic and to be poorly able to respond.  They were uncertain about the duration of his symptom complex but he was brought to the [facility] emergency room for evaluation where he was admitted and found to have a community-acquired pneumonia as well as a large low-density mass in the right lung base.  He is poorly able to give me any additional information about his presentation although he has improved from a neurocognitive standpoint since presentation but clearly not at baseline.  Review of Systems  Review of Systems   Unable to perform ROS: Mental status change       History  Past Medical History:   Diagnosis Date   • Arthritis    • Hyperlipidemia    • Hypertension    • Neck pain      Past Surgical History:   Procedure Laterality Date   • HERNIA REPAIR       Family History   Problem Relation Age of Onset   • Cancer Mother    • Diabetes Mother    • Cancer Father    • Prostate cancer Father    • Heart attack Father      Social History     Tobacco Use   • Smoking status: Never Smoker   • Smokeless tobacco: Never Used   Vaping Use   • Vaping Use: Never used   Substance Use Topics   • Alcohol use: Never   • Drug use: Defer     Allergies:  Patient has no known allergies.    Objective     Vital Signs  Temp:  [95.4 °F (35.2 °C)] 95.4 °F (35.2 °C)  Heart Rate:  [] 94  Resp:  [18-19] 18  BP: ()/(4-84) 141/77      Physical Exam:   Physical Exam  Vitals and nursing note reviewed.   Constitutional:       Appearance: He is ill-appearing. He is not toxic-appearing.   Cardiovascular:      Rate and Rhythm: Rhythm  irregular.      Heart sounds: Normal heart sounds.   Pulmonary:      Breath sounds: Normal breath sounds.   Skin:     General: Skin is warm.   Neurological:      Mental Status: He is alert.          Results Review:   CBC    Results from last 7 days   Lab Units 10/05/21  1951   WBC 10*3/mm3 14.20*   HEMOGLOBIN g/dL 11.7*   PLATELETS 10*3/mm3 392     BMP   Results from last 7 days   Lab Units 10/05/21  1951   SODIUM mmol/L 120*   POTASSIUM mmol/L 2.5*   CHLORIDE mmol/L 75*   CO2 mmol/L 21.0*   BUN mg/dL 16   CREATININE mg/dL 0.96   GLUCOSE mg/dL 178*   MAGNESIUM mg/dL 1.5*     Cr Clearance Estimated Creatinine Clearance: 62.3 mL/min (by C-G formula based on SCr of 0.96 mg/dL).  Coag   Results from last 7 days   Lab Units 10/05/21  1951   INR  1.18*   APTT seconds 30.8     HbA1C No results found for: HGBA1C  Blood Glucose No results found for: POCGLU  Infection     CMP   Results from last 7 days   Lab Units 10/05/21  1951   SODIUM mmol/L 120*   POTASSIUM mmol/L 2.5*   CHLORIDE mmol/L 75*   CO2 mmol/L 21.0*   BUN mg/dL 16   CREATININE mg/dL 0.96   GLUCOSE mg/dL 178*   ALBUMIN g/dL 3.30*   BILIRUBIN mg/dL 0.6   ALK PHOS U/L 83   AST (SGOT) U/L 155*   ALT (SGPT) U/L 92*   LIPASE U/L 47     UA    Results from last 7 days   Lab Units 10/05/21  1957   NITRITE UA  Negative   WBC UA /HPF 31-50*   BACTERIA UA /HPF None Seen   SQUAM EPITHEL UA /HPF 7-12*     Radiology(recent) CT Head Without Contrast    Result Date: 10/5/2021  No acute intracranial abnormality. Electronically signed by:  Lenny Mckee  10/5/2021 9:37 PM    CT Chest With Contrast Diagnostic    Result Date: 10/5/2021  1. There is a large low-density mass in the right lung base measuring 9.5 x 5.3 cm. This is favored to represent a neoplastic process which must be considered and excluded. This lesion would be amenable to CT-guided biopsy as determined clinically. Alternatively, a PET/CT study could be obtained for better characterization. Follow-up is  recommended. 2. Reticulonodular type infiltrates in the right upper lobe with additional nodular type groundglass and confluent groundglass airspace opacities in the left upper lobe and lingula. Findings are most likely related to infectious bronchopneumonia, possibly related to viral pneumonia and/or aspiration. COVID pneumonia should be considered. 3. Element of interlobular septal thickening may represent early pulmonary edema. 4. Emphysema. 5. Advanced coronary artery calcifications. 6. Enlarged subcarinal lymph node measuring 1.8 cm in short axis. Metastatic disease must be excluded. 7. Hepatic steatosis. The liver also demonstrates a very subtle micronodular contour which may represent liver disease or early cirrhosis. Correlation with hepatic function is recommended. 8. Diverticulosis. 9. Severe atherosclerotic disease. 10. Additional incidental and chronic findings as above. *[employer] GUIDELINES: Low risk patient: <6mm - Low Risk, no further f/u >6-8mm - low dose CT 6-12 mo, then 2nd f/u CT at 18-24mo >8mm - low dose CT 3,9,24mo OR PET/CT OR Biopsy High Risk Patient: <6mm - optional CT at 12 mo >6-8mm - low dose CT 6-12 mo, then 2nd f/u CT at 18-24mo >8mm - low dose CT at 3 mo, OR PET/CT OR Biopsy MULTIPLE NODULES: Low Risk Patient: <6mm - Low Risk, no further f/u >6-8mm - low dose CT 3-6 mo, then 2nd f/u CT at 18-24mo >8mm - low dose CT 3-6 mo, then 2nd f/u CT at 18-24mo High Risk Patient: <6mm - High risk, multiple nodules, optional CT at 12 mo >6-8mm - low dose CT 3-6 mo, then 2nd f/u CT at 18-24mo >8mm - low dose CT 3-6 mo, then 2nd f/u CT at 18-24mo Slot 67 Electronically signed by:  Arcenio Vanegas M.D.  10/5/2021 9:30 PM    CT Abdomen Pelvis With Contrast    Result Date: 10/5/2021  1. There is a large low-density mass in the right lung base measuring 9.5 x 5.3 cm. This is favored to represent a neoplastic process which must be considered and excluded. This lesion would be amenable to  CT-guided biopsy as determined clinically. Alternatively, a PET/CT study could be obtained for better characterization. Follow-up is recommended. 2. Reticulonodular type infiltrates in the right upper lobe with additional nodular type groundglass and confluent groundglass airspace opacities in the left upper lobe and lingula. Findings are most likely related to infectious bronchopneumonia, possibly related to viral pneumonia and/or aspiration. COVID pneumonia should be considered. 3. Element of interlobular septal thickening may represent early pulmonary edema. 4. Emphysema. 5. Advanced coronary artery calcifications. 6. Enlarged subcarinal lymph node measuring 1.8 cm in short axis. Metastatic disease must be excluded. 7. Hepatic steatosis. The liver also demonstrates a very subtle micronodular contour which may represent liver disease or early cirrhosis. Correlation with hepatic function is recommended. 8. Diverticulosis. 9. Severe atherosclerotic disease. 10. Additional incidental and chronic findings as above. *[employer] GUIDELINES: Low risk patient: <6mm - Low Risk, no further f/u >6-8mm - low dose CT 6-12 mo, then 2nd f/u CT at 18-24mo >8mm - low dose CT 3,9,24mo OR PET/CT OR Biopsy High Risk Patient: <6mm - optional CT at 12 mo >6-8mm - low dose CT 6-12 mo, then 2nd f/u CT at 18-24mo >8mm - low dose CT at 3 mo, OR PET/CT OR Biopsy MULTIPLE NODULES: Low Risk Patient: <6mm - Low Risk, no further f/u >6-8mm - low dose CT 3-6 mo, then 2nd f/u CT at 18-24mo >8mm - low dose CT 3-6 mo, then 2nd f/u CT at 18-24mo High Risk Patient: <6mm - High risk, multiple nodules, optional CT at 12 mo >6-8mm - low dose CT 3-6 mo, then 2nd f/u CT at 18-24mo >8mm - low dose CT 3-6 mo, then 2nd f/u CT at 18-24mo Slot 67 Electronically signed by:  Arcenio Vanegas M.D.  10/5/2021 9:30 PM    XR Chest 1 View    Result Date: 10/5/2021  1. There is an abnormality in the right lower chest. Part of this has a masslike configuration.  It may be due to parenchymal parenchymal disease, pleural disease, or both. A lung mass is possible. Further evaluation with chest CT with contrast is recommended. 2. Question some minor opacity in the lateral left midlung which may be due to pneumonia.  Electronically Signed By-Margarita Cooper MD On:10/5/2021 9:06 PM This report was finalized on [id number] by  Margarita Cooper MD.       Assessment:    Acute mental status changes secondary to acute metabolic encephalopathy secondary to community-acquired pneumonia  Acute sepsis secondary to the above  Acute lung mass  Panlobular COPD with emphysema  Chronic mucopurulent bronchitis  Hepatic steatosis  Hepatic transaminase derangement  Atherosclerotic heart disease of native coronary arteries with angina pectoris  Coronary calcifications  Chronic kidney disease stage II  Microscopic hematuria  Hypertension associated chronic kidney disease stage II  Hypokalemia likely secondary to the above  Hyponatremia  DDD L/S  Myofascial pain syndrome  Narcotic dependency    Plan:  Pulmonary and oncologic evaluations//nephrology to participate //aggressive pulmonary toilet  Expected Length of Stay 5 days    I discussed the patients findings and my recommendations with patient and nursing staff.     Herve Baez MD  10/06/21  08:02 EDT

## 2021-10-07 NOTE — PROGRESS NOTES
"NEPHROLOGY PROGRESS NOTE------[facility]    [facility]  [phone]  Uche Mojica MD      Patient Care Team:  Herve Baez MD as PCP - General (Family Medicine)  Uche Mojica MD as Consulting Physician (Nephrology)  Estefania Sancehz MD as Consulting Physician (Hematology and Oncology)      Provider:  Uche Mojica MD  Patient Name: Prabhjot Roldan  :  1935    SUBJECTIVE:  F/U Hyponatremia  No chest pain or SOA  Feels better    Medication:  aspirin, 81 mg, Oral, Daily  atorvastatin, 20 mg, Oral, Nightly  budesonide, 0.5 mg, Nebulization, BID - RT  cefepime, 2 g, Intravenous, Q8H  dilTIAZem CD, 180 mg, Oral, Q24H  enoxaparin, 40 mg, Subcutaneous, Daily  ipratropium-albuterol, 3 mL, Nebulization, 4x Daily - RT      custom IV infusion builder, , Last Rate: 75 mL/hr at 10/06/21 1825        OBJECTIVE    Vital Sign Min/Max for last 24 hours  Temp  Min: 98.5 °F (36.9 °C)  Max: 98.5 °F (36.9 °C)   BP  Min: 118/65  Max: 149/77   Pulse  Min: 86  Max: 102   Resp  Min: 16  Max: 20   SpO2  Min: 92 %  Max: 99 %   No data recorded   No data recorded     Flowsheet Rows      First Filed Value   Admission Height  175.3 cm (69\") Documented at 10/05/2021 1928   Admission Weight  86.2 kg (190 lb) Documented at 10/05/2021 1928          No intake/output data recorded.  No intake/output data recorded.    Physical Exam:  General Appearance: alert, appears stated age and cooperative  Head: normocephalic, without obvious abnormality and atraumatic  Eyes: conjunctivae and sclerae normal and no icterus  Neck: supple and no JVD  Lungs: clear to auscultation and respirations regular  Heart: regular rhythm & normal rate and normal S1, S2  Chest: Wall no abnormalities observed  Abdomen: normal bowel sounds and soft non-tender  Extremities: moves extremities well, no edema, no cyanosis and no redness  Skin: no bleeding, bruising or rash, turgor normal, color normal " and no lesions noted  Neurologic: Alert, and oriented. No focal deficits    Labs:    WBC WBC   Date Value Ref Range Status   10/07/2021 15.60 (H) 3.40 - 10.80 10*3/mm3 Final   10/06/2021 19.60 (H) 3.40 - 10.80 10*3/mm3 Final   10/05/2021 14.20 (H) 3.40 - 10.80 10*3/mm3 Final      HGB Hemoglobin   Date Value Ref Range Status   10/07/2021 11.7 (L) 13.0 - 17.7 g/dL Final   10/06/2021 12.6 (L) 13.0 - 17.7 g/dL Final   10/05/2021 11.7 (L) 13.0 - 17.7 g/dL Final      HCT Hematocrit   Date Value Ref Range Status   10/07/2021 34.3 (L) 37.5 - 51.0 % Final   10/06/2021 36.9 (L) 37.5 - 51.0 % Final   10/05/2021 33.8 (L) 37.5 - 51.0 % Final      Platlets No results found for: LABPLAT   MCV MCV   Date Value Ref Range Status   10/07/2021 86.8 79.0 - 97.0 fL Final   10/06/2021 86.4 79.0 - 97.0 fL Final   10/05/2021 86.3 79.0 - 97.0 fL Final          Sodium Sodium   Date Value Ref Range Status   10/07/2021 124 (L) 136 - 145 mmol/L Final   10/06/2021 123 (L) 136 - 145 mmol/L Final   10/05/2021 120 (L) 136 - 145 mmol/L Final      Potassium Potassium   Date Value Ref Range Status   10/07/2021 3.7 3.5 - 5.2 mmol/L Final     Comment:     Slight hemolysis detected by analyzer. Results may be affected.   10/06/2021 3.1 (L) 3.5 - 5.2 mmol/L Final   10/05/2021 2.5 (C) 3.5 - 5.2 mmol/L Final      Chloride Chloride   Date Value Ref Range Status   10/07/2021 87 (L) 98 - 107 mmol/L Final   10/06/2021 82 (L) 98 - 107 mmol/L Final   10/05/2021 75 (L) 98 - 107 mmol/L Final      CO2 CO2   Date Value Ref Range Status   10/07/2021 24.0 22.0 - 29.0 mmol/L Final   10/06/2021 25.0 22.0 - 29.0 mmol/L Final   10/05/2021 21.0 (L) 22.0 - 29.0 mmol/L Final      BUN BUN   Date Value Ref Range Status   10/07/2021 22 8 - 23 mg/dL Final   10/06/2021 14 8 - 23 mg/dL Final   10/05/2021 16 8 - 23 mg/dL Final      Creatinine Creatinine   Date Value Ref Range Status   10/07/2021 0.65 (L) 0.76 - 1.27 mg/dL Final   10/06/2021 0.67 (L) 0.76 - 1.27 mg/dL Final    10/05/2021 0.96 0.76 - 1.27 mg/dL Final      Calcium Calcium   Date Value Ref Range Status   10/07/2021 8.4 (L) 8.6 - 10.5 mg/dL Final   10/06/2021 8.6 8.6 - 10.5 mg/dL Final   10/05/2021 8.9 8.6 - 10.5 mg/dL Final      PO4 No components found for: PO4   Albumin Albumin   Date Value Ref Range Status   10/07/2021 2.60 (L) 3.50 - 5.20 g/dL Final   10/06/2021 3.00 (L) 3.50 - 5.20 g/dL Final   10/05/2021 3.30 (L) 3.50 - 5.20 g/dL Final      Magnesium Magnesium   Date Value Ref Range Status   10/06/2021 1.5 (L) 1.6 - 2.4 mg/dL Final   10/05/2021 1.5 (L) 1.6 - 2.4 mg/dL Final      Uric Acid No components found for: URIC ACID     Imaging Results (Last 72 Hours)     Procedure Component Value Units Date/Time    CT Head Without Contrast [[id number]] Collected: 10/05/21 2335     Updated: 10/05/21 2338    Narrative:      EXAMINATION: CT HEAD WO CONTRAST    DATE: 10/5/2021 10:28 PM     INDICATION: Altered mental status. Dizziness and weakness.     COMPARISON: None available.     TECHNIQUE: Thin section noncontrast axial images were obtained through the head. Coronal reformatted images were created.  CT dose lowering techniques were used, to include: automated exposure control, adjustment for patient size, and or use of iterative   reconstruction    FINDINGS:  Intracranial contents:    Generalized parenchymal volume loss without lobar predominance. No hydrocephalus.   A few foci of hypoattenuation within periventricular white matter most commonly represent chronic microangiopathy.  There is no midline shift or mass effect. No   hemorrhage, mass lesion, or evidence of evolving large territorial infarct. Intracranial atherosclerosis.    Bones and extracranial soft tissues:    Normal calvarium.  Orbits unremarkable. The visualized paranasal sinuses are clear. No mastoid or middle ear effusions.        Impression:        No acute intracranial abnormality.     Electronically signed by:  Lenny Mckee    10/5/2021 9:37 PM    CT  Abdomen Pelvis With Contrast [[id number]] Collected: 10/05/21 2330     Updated: 10/05/21 2331    Narrative:      Exam: CT thorax with contrast    Date: October 5, 2021    History: Generalized weakness, hypotension, dehydration, shortness of breath    Comparison: None available    Technique: Contiguous axial CT images obtained of the thorax, abdomen and pelvis following the uneventful intravenous administration of 100 mL Isovue-370 contrast. Delayed imaging was obtained. Additionally, sagittal and coronal reformatted images were   obtained. CT dose lowering techniques were used, to include: automated exposure control, adjustment for patient size, and or use of iterative reconstruction.    Findings:    Thoracic aorta: There are diffuse atherosclerotic changes. There is no aneurysm or dissection.    HEART: The heart is borderline in size. There are coronary artery calcifications.    Pulmonary arteries: The pulmonary arteries are well visualized. There is no filling defect to suggest an acute pulmonary embolus.    Mediastinum: There is an enlarged subcarinal lymph node measuring 1.8 cm in short axis.    Lung parenchyma: There is a subtle background of emphysema. There are reticulonodular type infiltrates in the right upper lobe. There is interlobular septal thickening in both lungs. There are nodular type groundglass and confluent groundglass airspace   opacities in the left upper lobe and lingula. There is a large low-density mass in the right lung base measuring up to 9.5 x 5.3 cm. This is well visualized on series 2 image 75. There is pleural thickening along the lateral aspect of the right lung.    CT ABDOMEN:  Liver: The liver is mildly hypodense. The liver demonstrates a very subtle micronodular contour.    Spleen: Normal.    Pancreas: Normal.    Adrenal glands: Normal.    Gallbladder: The gallbladder is mildly distended.    Kidneys: The kidneys demonstrate symmetric enhancement. There is no hydronephrosis or  obstructive uropathy.    Abdominal mesentery: There is no pathologic intra-abdominal mass or adenopathy.    Bowel loops: There is colonic diverticulosis. The appendix is normal. There is no small bowel obstruction.    CT PELVIS:  There is a urinary bladder diverticulum along the left lateral aspect of the urinary bladder. There is a questionable undescended right testicle. This can be correlated with physical exam.    Abdominal aorta: There are severe atherosclerotic changes.    Osseous structures: There is levoconvex scoliotic curvature of the lumbar spine. There are moderate or moderate to severe degenerative changes in the spine. There are severe degenerative changes in the left shoulder and moderate degenerative changes in   the right shoulder. No acute fractures are identified.      Impression:      1. There is a large low-density mass in the right lung base measuring 9.5 x 5.3 cm. This is favored to represent a neoplastic process which must be considered and excluded. This lesion would be amenable to CT-guided biopsy as determined clinically.   Alternatively, a PET/CT study could be obtained for better characterization. Follow-up is recommended.  2. Reticulonodular type infiltrates in the right upper lobe with additional nodular type groundglass and confluent groundglass airspace opacities in the left upper lobe and lingula. Findings are most likely related to infectious bronchopneumonia,   possibly related to viral pneumonia and/or aspiration. COVID pneumonia should be considered.  3. Element of interlobular septal thickening may represent early pulmonary edema.  4. Emphysema.  5. Advanced coronary artery calcifications.  6. Enlarged subcarinal lymph node measuring 1.8 cm in short axis. Metastatic disease must be excluded.  7. Hepatic steatosis. The liver also demonstrates a very subtle micronodular contour which may represent liver disease or early cirrhosis. Correlation with hepatic function is  recommended.  8. Diverticulosis.  9. Severe atherosclerotic disease.  10. Additional incidental and chronic findings as above.    *[employer] GUIDELINES:  Low risk patient:  <6mm - Low Risk, no further f/u  >6-8mm - low dose CT 6-12 mo, then 2nd f/u CT at 18-24mo  >8mm - low dose CT 3,9,24mo OR PET/CT OR Biopsy    High Risk Patient:  <6mm - optional CT at 12 mo  >6-8mm - low dose CT 6-12 mo, then 2nd f/u CT at 18-24mo  >8mm - low dose CT at 3 mo, OR PET/CT OR Biopsy    MULTIPLE NODULES:  Low Risk Patient:  <6mm - Low Risk, no further f/u  >6-8mm - low dose CT 3-6 mo, then 2nd f/u CT at 18-24mo  >8mm - low dose CT 3-6 mo, then 2nd f/u CT at 18-24mo    High Risk Patient:  <6mm - High risk, multiple nodules, optional CT at 12 mo  >6-8mm - low dose CT 3-6 mo, then 2nd f/u CT at 18-24mo  >8mm - low dose CT 3-6 mo, then 2nd f/u CT at 18-24mo        Slot 67    Electronically signed by:  Arcenio Vanegas M.D.    10/5/2021 9:30 PM    CT Chest With Contrast Diagnostic [[id number]] Collected: 10/05/21 2318     Updated: 10/05/21 2331    Narrative:      Exam: CT thorax with contrast    Date: October 5, 2021    History: Generalized weakness, hypotension, dehydration, shortness of breath    Comparison: None available    Technique: Contiguous axial CT images obtained of the thorax, abdomen and pelvis following the uneventful intravenous administration of 100 mL Isovue-370 contrast. Delayed imaging was obtained. Additionally, sagittal and coronal reformatted images were   obtained. CT dose lowering techniques were used, to include: automated exposure control, adjustment for patient size, and or use of iterative reconstruction.    Findings:    Thoracic aorta: There are diffuse atherosclerotic changes. There is no aneurysm or dissection.    HEART: The heart is borderline in size. There are coronary artery calcifications.    Pulmonary arteries: The pulmonary arteries are well visualized. There is no filling defect to suggest an  acute pulmonary embolus.    Mediastinum: There is an enlarged subcarinal lymph node measuring 1.8 cm in short axis.    Lung parenchyma: There is a subtle background of emphysema. There are reticulonodular type infiltrates in the right upper lobe. There is interlobular septal thickening in both lungs. There are nodular type groundglass and confluent groundglass airspace   opacities in the left upper lobe and lingula. There is a large low-density mass in the right lung base measuring up to 9.5 x 5.3 cm. This is well visualized on series 2 image 75. There is pleural thickening along the lateral aspect of the right lung.    CT ABDOMEN:  Liver: The liver is mildly hypodense. The liver demonstrates a very subtle micronodular contour.    Spleen: Normal.    Pancreas: Normal.    Adrenal glands: Normal.    Gallbladder: The gallbladder is mildly distended.    Kidneys: The kidneys demonstrate symmetric enhancement. There is no hydronephrosis or obstructive uropathy.    Abdominal mesentery: There is no pathologic intra-abdominal mass or adenopathy.    Bowel loops: There is colonic diverticulosis. The appendix is normal. There is no small bowel obstruction.    CT PELVIS:  There is a urinary bladder diverticulum along the left lateral aspect of the urinary bladder. There is a questionable undescended right testicle. This can be correlated with physical exam.    Abdominal aorta: There are severe atherosclerotic changes.    Osseous structures: There is levoconvex scoliotic curvature of the lumbar spine. There are moderate or moderate to severe degenerative changes in the spine. There are severe degenerative changes in the left shoulder and moderate degenerative changes in   the right shoulder. No acute fractures are identified.      Impression:      1. There is a large low-density mass in the right lung base measuring 9.5 x 5.3 cm. This is favored to represent a neoplastic process which must be considered and excluded. This lesion  would be amenable to CT-guided biopsy as determined clinically.   Alternatively, a PET/CT study could be obtained for better characterization. Follow-up is recommended.  2. Reticulonodular type infiltrates in the right upper lobe with additional nodular type groundglass and confluent groundglass airspace opacities in the left upper lobe and lingula. Findings are most likely related to infectious bronchopneumonia,   possibly related to viral pneumonia and/or aspiration. COVID pneumonia should be considered.  3. Element of interlobular septal thickening may represent early pulmonary edema.  4. Emphysema.  5. Advanced coronary artery calcifications.  6. Enlarged subcarinal lymph node measuring 1.8 cm in short axis. Metastatic disease must be excluded.  7. Hepatic steatosis. The liver also demonstrates a very subtle micronodular contour which may represent liver disease or early cirrhosis. Correlation with hepatic function is recommended.  8. Diverticulosis.  9. Severe atherosclerotic disease.  10. Additional incidental and chronic findings as above.    *[employer]ET[employer] GUIDELINES:  Low risk patient:  <6mm - Low Risk, no further f/u  >6-8mm - low dose CT 6-12 mo, then 2nd f/u CT at 18-24mo  >8mm - low dose CT 3,9,24mo OR PET/CT OR Biopsy    High Risk Patient:  <6mm - optional CT at 12 mo  >6-8mm - low dose CT 6-12 mo, then 2nd f/u CT at 18-24mo  >8mm - low dose CT at 3 mo, OR PET/CT OR Biopsy    MULTIPLE NODULES:  Low Risk Patient:  <6mm - Low Risk, no further f/u  >6-8mm - low dose CT 3-6 mo, then 2nd f/u CT at 18-24mo  >8mm - low dose CT 3-6 mo, then 2nd f/u CT at 18-24mo    High Risk Patient:  <6mm - High risk, multiple nodules, optional CT at 12 mo  >6-8mm - low dose CT 3-6 mo, then 2nd f/u CT at 18-24mo  >8mm - low dose CT 3-6 mo, then 2nd f/u CT at 18-24mo        Slot 67    Electronically signed by:  Arcenio Vanegas M.D.    10/5/2021 9:30 PM    XR Chest 1 View [[id number]] Collected: 10/05/21 2104     Updated:  10/05/21 2108    Narrative:      Examination: XR CHEST 1 VW-     Date of Exam: 10/5/2021 8:10 PM     Indication: Weakness.       Comparison: 05/20/2015     Technique: 1 view of the chest      FINDINGS:  The heart size is within normal. There is a lobulated opacity in the  inferior lateral right chest that measures about 5.5 cm; the lower  margin of this is obscured. There is some adjacent pleural thickening or  fluid in the inferior lateral right chest. These findings have developed  since the 2015 exam. There may be some minor patchy airspace opacity in  the lateral aspect of the left midlung. There is aortic calcification.  No significant bone abnormality.       Impression:      1. There is an abnormality in the right lower chest. Part of this has a  masslike configuration. It may be due to parenchymal parenchymal  disease, pleural disease, or both. A lung mass is possible. Further  evaluation with chest CT with contrast is recommended.  2. Question some minor opacity in the lateral left midlung which may be  due to pneumonia.     Electronically Signed By-Margarita Cooper MD On:10/5/2021 9:06 PM  This report was finalized on [id number] by  Margarita Cooper MD.          Results for orders placed during the hospital encounter of 10/05/21    XR Chest 1 View    Narrative  Examination: XR CHEST 1 VW-    Date of Exam: 10/5/2021 8:10 PM    Indication: Weakness.    Comparison: 05/20/2015    Technique: 1 view of the chest    FINDINGS:  The heart size is within normal. There is a lobulated opacity in the  inferior lateral right chest that measures about 5.5 cm; the lower  margin of this is obscured. There is some adjacent pleural thickening or  fluid in the inferior lateral right chest. These findings have developed  since the 2015 exam. There may be some minor patchy airspace opacity in  the lateral aspect of the left midlung. There is aortic calcification.  No significant bone abnormality.    Impression  1. There is an  abnormality in the right lower chest. Part of this has a  masslike configuration. It may be due to parenchymal parenchymal  disease, pleural disease, or both. A lung mass is possible. Further  evaluation with chest CT with contrast is recommended.  2. Question some minor opacity in the lateral left midlung which may be  due to pneumonia.    Electronically Signed By-Margarita Cooper MD On:10/5/2021 9:06 PM  This report was finalized on [id number] by  Margarita Cooper MD.      Results for orders placed in visit on 04/14/21    SCANNED - IMAGING      Results for orders placed in visit on 03/05/21    SCANNED - IMAGING            ASSESSMENT / PLAN      Sepsis (HCC)    ·  Hypokalemia/hypomagnesemia-likley related to thiazides. we will replace.   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 Urine osm > 500, Jess 27. exacerbated on thiazides. TSH is normal. Screen for paraproteins.  · Pneumonia  · Hypertension-his blood pressure was low admission hold meds.    · Right lung mass--may need biopsy/PET for further evaluation  · SIADH     Sodium slightly better, urine osmolality greater than 500 suggestive of underlying SIADH  We will give him a dose of Samsca, fluid restriction  Stop IVF  Monitor UOP, cr, lytes        Uche Mojica MD  [facility]  [phone]  10/07/21  09:22 EDT

## 2021-10-07 NOTE — PLAN OF CARE
Goal Outcome Evaluation:  Patient is alert and oriented, able to make needs known to staff. Patient has no complaints of pain noted at this time. Will continue to monitor.   Problem: Adult Inpatient Plan of Care  Goal: Plan of Care Review  Outcome: Ongoing, Progressing

## 2021-10-07 NOTE — PROGRESS NOTES
LOS: 1 day   Patient Care Team:  Herve Baez MD as PCP - General (Family Medicine)  Uche Mojica MD as Consulting Physician (Nephrology)  Estefania Sanchez MD as Consulting Physician (Hematology and Oncology)    Subjective:  Events noted    Objective:   Afebrile      Review of Systems:   Review of Systems        Vital Signs  Heart Rate:  [] 101  Resp:  [16-21] 18  BP: (118-131)/(65-77) 127/77    Physical Exam:  Physical Exam  Vitals and nursing note reviewed.          Radiology:  CT Head Without Contrast    Result Date: 10/5/2021  No acute intracranial abnormality. Electronically signed by:  Lenny Mckee  10/5/2021 9:37 PM    CT Chest With Contrast Diagnostic    Result Date: 10/5/2021  1. There is a large low-density mass in the right lung base measuring 9.5 x 5.3 cm. This is favored to represent a neoplastic process which must be considered and excluded. This lesion would be amenable to CT-guided biopsy as determined clinically. Alternatively, a PET/CT study could be obtained for better characterization. Follow-up is recommended. 2. Reticulonodular type infiltrates in the right upper lobe with additional nodular type groundglass and confluent groundglass airspace opacities in the left upper lobe and lingula. Findings are most likely related to infectious bronchopneumonia, possibly related to viral pneumonia and/or aspiration. COVID pneumonia should be considered. 3. Element of interlobular septal thickening may represent early pulmonary edema. 4. Emphysema. 5. Advanced coronary artery calcifications. 6. Enlarged subcarinal lymph node measuring 1.8 cm in short axis. Metastatic disease must be excluded. 7. Hepatic steatosis. The liver also demonstrates a very subtle micronodular contour which may represent liver disease or early cirrhosis. Correlation with hepatic function is recommended. 8. Diverticulosis. 9. Severe atherosclerotic disease. 10. Additional incidental and chronic findings  as above. *[employer] GUIDELINES: Low risk patient: <6mm - Low Risk, no further f/u >6-8mm - low dose CT 6-12 mo, then 2nd f/u CT at 18-24mo >8mm - low dose CT 3,9,24mo OR PET/CT OR Biopsy High Risk Patient: <6mm - optional CT at 12 mo >6-8mm - low dose CT 6-12 mo, then 2nd f/u CT at 18-24mo >8mm - low dose CT at 3 mo, OR PET/CT OR Biopsy MULTIPLE NODULES: Low Risk Patient: <6mm - Low Risk, no further f/u >6-8mm - low dose CT 3-6 mo, then 2nd f/u CT at 18-24mo >8mm - low dose CT 3-6 mo, then 2nd f/u CT at 18-24mo High Risk Patient: <6mm - High risk, multiple nodules, optional CT at 12 mo >6-8mm - low dose CT 3-6 mo, then 2nd f/u CT at 18-24mo >8mm - low dose CT 3-6 mo, then 2nd f/u CT at 18-24mo Slot 67 Electronically signed by:  Arcenio Vanegas M.D.  10/5/2021 9:30 PM    CT Abdomen Pelvis With Contrast    Result Date: 10/5/2021  1. There is a large low-density mass in the right lung base measuring 9.5 x 5.3 cm. This is favored to represent a neoplastic process which must be considered and excluded. This lesion would be amenable to CT-guided biopsy as determined clinically. Alternatively, a PET/CT study could be obtained for better characterization. Follow-up is recommended. 2. Reticulonodular type infiltrates in the right upper lobe with additional nodular type groundglass and confluent groundglass airspace opacities in the left upper lobe and lingula. Findings are most likely related to infectious bronchopneumonia, possibly related to viral pneumonia and/or aspiration. COVID pneumonia should be considered. 3. Element of interlobular septal thickening may represent early pulmonary edema. 4. Emphysema. 5. Advanced coronary artery calcifications. 6. Enlarged subcarinal lymph node measuring 1.8 cm in short axis. Metastatic disease must be excluded. 7. Hepatic steatosis. The liver also demonstrates a very subtle micronodular contour which may represent liver disease or early cirrhosis. Correlation with hepatic  function is recommended. 8. Diverticulosis. 9. Severe atherosclerotic disease. 10. Additional incidental and chronic findings as above. *[employer] GUIDELINES: Low risk patient: <6mm - Low Risk, no further f/u >6-8mm - low dose CT 6-12 mo, then 2nd f/u CT at 18-24mo >8mm - low dose CT 3,9,24mo OR PET/CT OR Biopsy High Risk Patient: <6mm - optional CT at 12 mo >6-8mm - low dose CT 6-12 mo, then 2nd f/u CT at 18-24mo >8mm - low dose CT at 3 mo, OR PET/CT OR Biopsy MULTIPLE NODULES: Low Risk Patient: <6mm - Low Risk, no further f/u >6-8mm - low dose CT 3-6 mo, then 2nd f/u CT at 18-24mo >8mm - low dose CT 3-6 mo, then 2nd f/u CT at 18-24mo High Risk Patient: <6mm - High risk, multiple nodules, optional CT at 12 mo >6-8mm - low dose CT 3-6 mo, then 2nd f/u CT at 18-24mo >8mm - low dose CT 3-6 mo, then 2nd f/u CT at 18-24mo Slot 67 Electronically signed by:  Arcenio Vanegas M.D.  10/5/2021 9:30 PM    XR Chest 1 View    Result Date: 10/5/2021  1. There is an abnormality in the right lower chest. Part of this has a masslike configuration. It may be due to parenchymal parenchymal disease, pleural disease, or both. A lung mass is possible. Further evaluation with chest CT with contrast is recommended. 2. Question some minor opacity in the lateral left midlung which may be due to pneumonia.  Electronically Signed By-Margarita Cooper MD On:10/5/2021 9:06 PM This report was finalized on [id number] by  Margarita Cooper MD.         Results Review:     I reviewed the patient's new clinical results.  I reviewed the patient's new imaging results and agree with the interpretation.    Medication Review:   Scheduled Meds:aspirin, 81 mg, Oral, Daily  atorvastatin, 20 mg, Oral, Nightly  budesonide, 0.5 mg, Nebulization, BID - RT  cefepime, 2 g, Intravenous, Q8H  dilTIAZem CD, 180 mg, Oral, Q24H  enoxaparin, 40 mg, Subcutaneous, Daily  ipratropium-albuterol, 3 mL, Nebulization, 4x Daily - RT      Continuous Infusions:custom IV  infusion builder, , Last Rate: 75 mL/hr at 10/06/21 1825      PRN Meds:.HYDROcodone-acetaminophen  •  magnesium sulfate **OR** magnesium sulfate **OR** magnesium sulfate  •  potassium chloride **OR** potassium chloride **OR** potassium chloride  •  [COMPLETED] Insert peripheral IV **AND** sodium chloride  •  tiZANidine    Labs:    CBC    Results from last 7 days   Lab Units 10/07/21  0546 10/06/21  0906 10/05/21  1951   WBC 10*3/mm3 15.60* 19.60* 14.20*   HEMOGLOBIN g/dL 11.7* 12.6* 11.7*   PLATELETS 10*3/mm3 317 316 392     BMP   Results from last 7 days   Lab Units 10/06/21  0906 10/05/21  1951   SODIUM mmol/L 123* 120*   POTASSIUM mmol/L 3.1* 2.5*   CHLORIDE mmol/L 82* 75*   CO2 mmol/L 25.0 21.0*   BUN mg/dL 14 16   CREATININE mg/dL 0.67* 0.96   GLUCOSE mg/dL 101* 178*   MAGNESIUM mg/dL 1.5* 1.5*     Cr Clearance Estimated Creatinine Clearance: 74.8 mL/min (A) (by C-G formula based on SCr of 0.67 mg/dL (L)).  Coag   Results from last 7 days   Lab Units 10/05/21  1951   INR  1.18*   APTT seconds 30.8     HbA1C No results found for: HGBA1C  Blood Glucose No results found for: POCGLU  Infection   Results from last 7 days   Lab Units 10/05/21  2001 10/05/21  1957 10/05/21  1951   BLOODCX  No growth at 24 hours  --  No growth at 24 hours   URINECX   --  Culture in progress  --      CMP   Results from last 7 days   Lab Units 10/06/21  0906 10/05/21  1951   SODIUM mmol/L 123* 120*   POTASSIUM mmol/L 3.1* 2.5*   CHLORIDE mmol/L 82* 75*   CO2 mmol/L 25.0 21.0*   BUN mg/dL 14 16   CREATININE mg/dL 0.67* 0.96   GLUCOSE mg/dL 101* 178*   ALBUMIN g/dL 3.00* 3.30*   BILIRUBIN mg/dL 0.8 0.6   ALK PHOS U/L 88 83   AST (SGOT) U/L 260* 155*   ALT (SGPT) U/L 177* 92*   LIPASE U/L  --  47     UA    Results from last 7 days   Lab Units 10/05/21  1957   NITRITE UA  Negative   WBC UA /HPF 31-50*   BACTERIA UA /HPF None Seen   SQUAM EPITHEL UA /HPF 7-12*   URINECX  Culture in progress     Radiology(recent) CT Head Without  Contrast    Result Date: 10/5/2021  No acute intracranial abnormality. Electronically signed by:  Lenny Mckee  10/5/2021 9:37 PM    CT Chest With Contrast Diagnostic    Result Date: 10/5/2021  1. There is a large low-density mass in the right lung base measuring 9.5 x 5.3 cm. This is favored to represent a neoplastic process which must be considered and excluded. This lesion would be amenable to CT-guided biopsy as determined clinically. Alternatively, a PET/CT study could be obtained for better characterization. Follow-up is recommended. 2. Reticulonodular type infiltrates in the right upper lobe with additional nodular type groundglass and confluent groundglass airspace opacities in the left upper lobe and lingula. Findings are most likely related to infectious bronchopneumonia, possibly related to viral pneumonia and/or aspiration. COVID pneumonia should be considered. 3. Element of interlobular septal thickening may represent early pulmonary edema. 4. Emphysema. 5. Advanced coronary artery calcifications. 6. Enlarged subcarinal lymph node measuring 1.8 cm in short axis. Metastatic disease must be excluded. 7. Hepatic steatosis. The liver also demonstrates a very subtle micronodular contour which may represent liver disease or early cirrhosis. Correlation with hepatic function is recommended. 8. Diverticulosis. 9. Severe atherosclerotic disease. 10. Additional incidental and chronic findings as above. *[employer] GUIDELINES: Low risk patient: <6mm - Low Risk, no further f/u >6-8mm - low dose CT 6-12 mo, then 2nd f/u CT at 18-24mo >8mm - low dose CT 3,9,24mo OR PET/CT OR Biopsy High Risk Patient: <6mm - optional CT at 12 mo >6-8mm - low dose CT 6-12 mo, then 2nd f/u CT at 18-24mo >8mm - low dose CT at 3 mo, OR PET/CT OR Biopsy MULTIPLE NODULES: Low Risk Patient: <6mm - Low Risk, no further f/u >6-8mm - low dose CT 3-6 mo, then 2nd f/u CT at 18-24mo >8mm - low dose CT 3-6 mo, then 2nd f/u CT at 18-24mo  High Risk Patient: <6mm - High risk, multiple nodules, optional CT at 12 mo >6-8mm - low dose CT 3-6 mo, then 2nd f/u CT at 18-24mo >8mm - low dose CT 3-6 mo, then 2nd f/u CT at 18-24mo Slot 67 Electronically signed by:  Arcenio Vanegas M.D.  10/5/2021 9:30 PM    CT Abdomen Pelvis With Contrast    Result Date: 10/5/2021  1. There is a large low-density mass in the right lung base measuring 9.5 x 5.3 cm. This is favored to represent a neoplastic process which must be considered and excluded. This lesion would be amenable to CT-guided biopsy as determined clinically. Alternatively, a PET/CT study could be obtained for better characterization. Follow-up is recommended. 2. Reticulonodular type infiltrates in the right upper lobe with additional nodular type groundglass and confluent groundglass airspace opacities in the left upper lobe and lingula. Findings are most likely related to infectious bronchopneumonia, possibly related to viral pneumonia and/or aspiration. COVID pneumonia should be considered. 3. Element of interlobular septal thickening may represent early pulmonary edema. 4. Emphysema. 5. Advanced coronary artery calcifications. 6. Enlarged subcarinal lymph node measuring 1.8 cm in short axis. Metastatic disease must be excluded. 7. Hepatic steatosis. The liver also demonstrates a very subtle micronodular contour which may represent liver disease or early cirrhosis. Correlation with hepatic function is recommended. 8. Diverticulosis. 9. Severe atherosclerotic disease. 10. Additional incidental and chronic findings as above. *[employer] GUIDELINES: Low risk patient: <6mm - Low Risk, no further f/u >6-8mm - low dose CT 6-12 mo, then 2nd f/u CT at 18-24mo >8mm - low dose CT 3,9,24mo OR PET/CT OR Biopsy High Risk Patient: <6mm - optional CT at 12 mo >6-8mm - low dose CT 6-12 mo, then 2nd f/u CT at 18-24mo >8mm - low dose CT at 3 mo, OR PET/CT OR Biopsy MULTIPLE NODULES: Low Risk Patient: <6mm - Low Risk,  no further f/u >6-8mm - low dose CT 3-6 mo, then 2nd f/u CT at 18-24mo >8mm - low dose CT 3-6 mo, then 2nd f/u CT at 18-24mo High Risk Patient: <6mm - High risk, multiple nodules, optional CT at 12 mo >6-8mm - low dose CT 3-6 mo, then 2nd f/u CT at 18-24mo >8mm - low dose CT 3-6 mo, then 2nd f/u CT at 18-24mo Slot 67 Electronically signed by:  Arcenio Vangeas M.D.  10/5/2021 9:30 PM    XR Chest 1 View    Result Date: 10/5/2021  1. There is an abnormality in the right lower chest. Part of this has a masslike configuration. It may be due to parenchymal parenchymal disease, pleural disease, or both. A lung mass is possible. Further evaluation with chest CT with contrast is recommended. 2. Question some minor opacity in the lateral left midlung which may be due to pneumonia.  Electronically Signed By-Margarita Cooper MD On:10/5/2021 9:06 PM This report was finalized on [id number] by  Margarita Cooper MD.     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

## 2021-10-07 NOTE — NURSING NOTE
MD places needle into right back but is unable to aspirate.  Tissue samples obtained for pathologist.

## 2021-10-07 NOTE — POST-PROCEDURE NOTE
Multiple 18 g cores obtained of RLL pleural based mass. Not clearly malignant. Final path pending. Well tolerated.

## 2021-10-07 NOTE — PROGRESS NOTES
Daily Progress Note        Sepsis (HCC)      Assessment    Right lower lobe mass which was not seen on imaging in 2015 suspicious for malignancy    Generalized weakness and lethargy and fatigue  Hyponatremia possible related to lung mass  COPD  Possible pneumonia with leukocytosis   hypokalemia  UTI        Recommendations:    s/p ultrasound-guided biopsy of right lower lobe mass with interventional radiology 10/7/2021  Multiple 18 g cores obtained of RLL pleural based mass. Not clearly malignant.   Final path pending     Antibiotic Rocephin  Bronchodilator\inhaled corticosteroid  DVT prophylaxis Lovenox              LOS: 1 day     Subjective         Objective     Vital signs for last 24 hours:  Vitals:    10/07/21 1001 10/07/21 1143 10/07/21 1149 10/07/21 1433   BP: 110/59      BP Location:       Patient Position:       Pulse: 102 91 92 93   Resp: 16 15 16 16   Temp:       TempSrc:       SpO2: 97% 96% 96% 95%   Weight:       Height:           Intake/Output last 3 shifts:  No intake/output data recorded.  Intake/Output this shift:  No intake/output data recorded.      Radiology  Imaging Results (Last 24 Hours)     Procedure Component Value Units Date/Time    CT Needle Biopsy Lung [[id number]] Collected: 10/07/21 1005     Updated: 10/07/21 1127    Narrative:      DATE OF EXAM:  10/7/2021 9:27 AM     PROCEDURE:  CT NEEDLE BIOPSY LUNG-     INDICATIONS:  Right lower lobe lung mass; A41.9-Sepsis, unspecified organism;  L22-Diaper dermatitis; J18.9-Pneumonia, unspecified organism;  E87.4-Ylaj-sskgclktma and hyponatremia; E87.6-Hypokalemia;  R53.1-Weakness; R91.8-Other nonspecific abnormal finding of lung field     COMPARISON:  No Comparisons Available     FLUOROSCOPIC TIME:  CT fluoroscopy time 2.61 seconds     PHYSICIAN MONITORED CONSCIOUS SEDATION TIME:  15 minutes     CONTRAST MEDIA:  None     TECHNIQUE:   The patient's medications were reviewed prior to the procedure. The  procedure, risks and alternatives were  discussed and informed written  consent was obtained. Maximal sterile barrier technique was utilized  throughout the procedure. The patient was placed in the right lateral  decubitus position and preliminary images were obtained. An appropriate  site was chosen for biopsy posteriorly over the right lower chest wall.  The skin was marked prepped and draped. IV conscious sedation was  administered consisting of Versed and fentanyl. The patient was  monitored by the IR nurse during the entire procedure. Utilizing maximal  sterile barrier technique and under local anesthesia a 17-gauge guide  needle was advanced incrementally into this area. The needle was  aspirated and no purulent fluid could be obtained. Multiple 18-gauge  core specimens were then obtained. These were reviewed by the  pathologist and felt to represent reactive fibrous tissue. Multiple  areas were sampled. These were placed in formalin. The needle was  removed and hemostasis achieved.          Impression:      Technically successful biopsy of the pleural-based mass at the right  lung base. Initial preliminary interpretation suggest dense fibrous  tissue. Final path results are pending. Postprocedure chest radiograph  is pending.     Electronically Signed By-Aly Brandon MD On:10/7/2021 10:07 AM  This report was finalized on [id number] by  Aly Brandon MD.    XR Chest 1 View [[id number]] Collected: 10/07/21 1015     Updated: 10/07/21 1127    Narrative:      DATE OF EXAM:  10/7/2021 10:12 AM     PROCEDURE:  XR CHEST 1 VW-     INDICATIONS:  s/p right lung biopsy; A41.9-Sepsis, unspecified organism; L22-Diaper  dermatitis; J18.9-Pneumonia, unspecified organism; E87.6-Dxgv-zvkndrzjsq  and hyponatremia; E87.6-Hypokalemia; R53.1-Weakness; R91.8-Other  nonspecific abnormal finding of lung field       COMPARISON:  AP portable chest 10/5/2021. CT-guided needle biopsy of right lung Mass.  10/7/2021 at 9:44 AM.     TECHNIQUE:   Single radiographic view of the  chest was obtained.     FINDINGS:  No pneumothorax is seen. Right lower lobe convex masslike density at the  right lateral costophrenic angle is again noted. Small right pleural  effusion is seen. Convex opacity in the medial left base corresponds to  focal eventration of the left hemidiaphragm, which is seen to better  advantage on prior CT chest study. Interstitial thickening or fibrotic  changes are thought to be present in both lungs. Heart size is normal.  Degenerative endplate spurring in the thoracic spine. Moderately  advanced degenerative changes of both shoulders.                   Impression:      No visible pneumothorax status post right lower lobe lung mass biopsy  earlier today.     Electronically Signed By-Opal Perales MD On:10/7/2021 10:17 AM  This report was finalized on [id number] by  Opal Perales MD.    US Liver [[id number]] Collected: 10/07/21 0948     Updated: 10/07/21 0952    Narrative:      DATE OF EXAM:  10/7/2021 9:29 AM     PROCEDURE:  US LIVER-     INDICATIONS:  Hepatic transaminase derangement; A41.9-Sepsis, unspecified organism;  L22-Diaper dermatitis; J18.9-Pneumonia, unspecified organism;  E87.1-Pwbh-bxloyjzkir and hyponatremia; E87.6-Hypokalemia;  R53.1-Weakness; R91.8-Other nonspecific abnormal finding of lung field     COMPARISON:  CT abdomen and pelvis 10/5/2021.     TECHNIQUE:   Grayscale and color Doppler ultrasound evaluation of the limited abdomen  was performed.     FINDINGS:  The liver size is normal, 16.3 cm in length. The liver maintains a  normal echotexture. There are no focal liver lesions. The liver does not  appear grossly cirrhotic or steatotic. The main portal vein is patent.  Hepatic veins are patent. Common bile duct caliber is normal, 4 mm. No  intrahepatic biliary ductal dilation is identified.        Impression:      Normal hepatic ultrasound.     Electronically Signed By-Opal Perales MD On:10/7/2021 9:50 AM  This report was finalized on [id number]  by  Opal Perales MD.          Labs:  Results from last 7 days   Lab Units 10/07/21  0546   WBC 10*3/mm3 15.60*   HEMOGLOBIN g/dL 11.7*   HEMATOCRIT % 34.3*   PLATELETS 10*3/mm3 317     Results from last 7 days   Lab Units 10/07/21  1252 10/07/21  0547 10/07/21  0547   SODIUM mmol/L 125*   < > 124*   POTASSIUM mmol/L 3.6   < > 3.7   CHLORIDE mmol/L 85*   < > 87*   CO2 mmol/L 26.0   < > 24.0   BUN mg/dL 22   < > 22   CREATININE mg/dL 0.65*   < > 0.65*   CALCIUM mg/dL 8.2*   < > 8.4*   BILIRUBIN mg/dL  --   --  0.9   ALK PHOS U/L  --   --  77   ALT (SGPT) U/L  --   --  592*   AST (SGOT) U/L  --   --  814*   GLUCOSE mg/dL 103*   < > 90    < > = values in this interval not displayed.         Results from last 7 days   Lab Units 10/07/21  0547 10/06/21  0906 10/05/21  1951   ALBUMIN g/dL 2.60* 3.00* 3.30*     Results from last 7 days   Lab Units 10/05/21  1951   CK TOTAL U/L 97   TROPONIN T ng/mL <0.010         Results from last 7 days   Lab Units 10/06/21  0906   MAGNESIUM mg/dL 1.5*     Results from last 7 days   Lab Units 10/05/21  1951   INR  1.18*   APTT seconds 30.8     Results from last 7 days   Lab Units 10/05/21  1951   TSH uIU/mL 1.420           Meds:   SCHEDULE  aspirin, 81 mg, Oral, Daily  atorvastatin, 20 mg, Oral, Nightly  budesonide, 0.5 mg, Nebulization, BID - RT  cefTRIAXone, 1 g, Intravenous, Q24H  dilTIAZem CD, 180 mg, Oral, Q24H  enoxaparin, 40 mg, Subcutaneous, Daily  ipratropium-albuterol, 3 mL, Nebulization, 4x Daily - RT      Infusions     PRNs  HYDROcodone-acetaminophen  •  magnesium sulfate **OR** magnesium sulfate **OR** magnesium sulfate  •  potassium chloride **OR** potassium chloride **OR** potassium chloride  •  [COMPLETED] Insert peripheral IV **AND** sodium chloride  •  tiZANidine    Physical Exam:  Physical Exam  Vitals reviewed.   Pulmonary:      Breath sounds: Rhonchi and rales present.   Neurological:      Mental Status: He is alert.         ROS  Review of Systems   Respiratory:  Positive for cough and shortness of breath.        Reviewed the patient's new clinical results    Electronically signed by DAVID Quigley

## 2021-10-08 NOTE — PROGRESS NOTES
Hematology/Oncology Inpatient Progress Note    PATIENT NAME: Prabhjot Roldan  : 1935  MRN: [id number]    CHIEF COMPLAINT: Lethargy and altered mental status    HISTORY OF PRESENT ILLNESS:    86 y.o. male presented to [facility] on 10/5/2021 with a complaint of altered mental status.  He was noted to be lethargic, hypotensive upon presentation.  Electrolyte abnormalities and community-acquired pneumonia is suspected and he was admitted.  Incidentally CT of the chest showed right lung mass lower lobe.     10/06/21  Hematology/Oncology was consulted for right lung mass.  His daughter was there.  Is having diminished appetite.  He is not able to eat due to lack of dentures since he lost left home.     Past Medical History:  #1 hypertension  2.  Hyperlipidemia  3.  Arthritis  Surgical History:  Hernia repair  Social History:  He was ex-smoker.  He quit smoking 40 years ago.  There is no history of alcohol abuse.  Family History:  1.  Mother had carcinoma.  She also suffered from diabetes mellitus.  2.  Father had prostate carcinoma.  He  from myocardial infarction.  Allergies:  No known allergies.    INTERVAL HISTORY:  • 10/7/2021 CT-guided lung biopsy was done.  • 10/8/2021 path report is pending.  WBC 13, hemoglobin 11.1, platelet count 300,000.   (1-40)  (1-41) total bilirubin 0.9 (0-1.2)    Subjective   Patient continues to feel fatigued.    ROS:  Review of Systems   Constitutional: Positive for activity change and appetite change. Negative for chills, fatigue, fever and unexpected weight change.   HENT: Negative for congestion, dental problem, hearing loss, mouth sores, nosebleeds, sore throat and trouble swallowing.    Eyes: Negative for photophobia and visual disturbance.   Respiratory: Negative for cough, chest tightness and shortness of breath.    Cardiovascular: Negative for chest pain, palpitations and leg swelling.   Gastrointestinal: Negative for abdominal distention,  "abdominal pain, blood in stool, constipation, diarrhea, nausea and vomiting.   Endocrine: Negative for cold intolerance and heat intolerance.   Genitourinary: Negative for decreased urine volume, difficulty urinating, frequency, hematuria and urgency.   Musculoskeletal: Negative for arthralgias and gait problem.   Skin: Negative for rash and wound.   Neurological: Negative for dizziness, tremors, weakness, light-headedness, numbness and headaches.   Hematological: Negative for adenopathy. Does not bruise/bleed easily.   Psychiatric/Behavioral: Positive for confusion. Negative for hallucinations. The patient is not nervous/anxious.    All other systems reviewed and are negative.       MEDICATIONS:    Scheduled Meds:  aspirin, 81 mg, Oral, Daily  atorvastatin, 20 mg, Oral, Nightly  budesonide, 0.5 mg, Nebulization, BID - RT  cefTRIAXone, 1 g, Intravenous, Q24H  dilTIAZem CD, 180 mg, Oral, Q24H  enoxaparin, 40 mg, Subcutaneous, Daily  ipratropium-albuterol, 3 mL, Nebulization, 4x Daily - RT  tolvaptan, 15 mg, Oral, Once       Continuous Infusions:      PRN Meds:  HYDROcodone-acetaminophen  •  magnesium sulfate **OR** magnesium sulfate **OR** magnesium sulfate  •  potassium chloride **OR** potassium chloride **OR** potassium chloride  •  [COMPLETED] Insert peripheral IV **AND** sodium chloride  •  tiZANidine     ALLERGIES:  No Known Allergies    Objective    VITALS:   /62 (BP Location: Left arm, Patient Position: Lying)   Pulse 91   Temp 97.8 °F (36.6 °C) (Axillary)   Resp 18   Ht 175.3 cm (69\")   Wt 84.4 kg (186 lb)   SpO2 96%   BMI 27.47 kg/m²     PHYSICAL EXAM:  Physical Exam  Vitals and nursing note reviewed.   Constitutional:       General: He is not in acute distress.     Appearance: Normal appearance. He is well-developed. He is not diaphoretic.   HENT:      Head: Normocephalic and atraumatic.      Nose: Nose normal.      Mouth/Throat:      Mouth: Mucous membranes are moist.      Pharynx: Oropharynx " is clear. No oropharyngeal exudate or posterior oropharyngeal erythema.   Eyes:      General: No scleral icterus.     Extraocular Movements: Extraocular movements intact.      Conjunctiva/sclera: Conjunctivae normal.      Pupils: Pupils are equal, round, and reactive to light.   Cardiovascular:      Rate and Rhythm: Normal rate and regular rhythm.      Heart sounds: Normal heart sounds. No murmur heard.     Pulmonary:      Effort: Pulmonary effort is normal. No respiratory distress.      Breath sounds: No wheezing or rales.      Comments: There is a diminished breath sounds in the right infrascapular area  Abdominal:      General: Bowel sounds are normal. There is no distension.      Palpations: Abdomen is soft. There is no mass.      Tenderness: There is no abdominal tenderness. There is no guarding.   Genitourinary:     Comments: Deferred   Musculoskeletal:         General: No swelling, tenderness or deformity. Normal range of motion.      Cervical back: Normal range of motion and neck supple.      Right lower leg: No edema.      Left lower leg: No edema.   Lymphadenopathy:      Cervical: No cervical adenopathy.      Upper Body:      Right upper body: No supraclavicular adenopathy.      Left upper body: No supraclavicular adenopathy.   Skin:     General: Skin is warm and dry.      Coloration: Skin is not pale.      Findings: No bruising, erythema or rash.   Neurological:      General: No focal deficit present.      Mental Status: He is alert and oriented to person, place, and time.      Coordination: Coordination normal.   Psychiatric:         Mood and Affect: Mood normal.         Behavior: Behavior normal.         Thought Content: Thought content normal.           RECENT LABS:  Lab Results (last 24 hours)     Procedure Component Value Units Date/Time    Hepatic Function Panel [[id number]]  (Abnormal) Collected: 10/08/21 0533    Specimen: Blood Updated: 10/08/21 0634     Total Protein 6.7 g/dL      Albumin 2.50  g/dL      ALT (SGPT) 881 U/L      AST (SGOT) 965 U/L      Alkaline Phosphatase 74 U/L      Total Bilirubin 0.9 mg/dL      Bilirubin, Direct 0.4 mg/dL      Bilirubin, Indirect 0.5 mg/dL     Basic Metabolic Panel [[id number]]  (Abnormal) Collected: 10/08/21 0533    Specimen: Blood Updated: 10/08/21 0620     Glucose 103 mg/dL      BUN 20 mg/dL      Creatinine 0.62 mg/dL      Sodium 127 mmol/L      Potassium 3.5 mmol/L      Comment: Slight hemolysis detected by analyzer. Results may be affected.        Chloride 89 mmol/L      CO2 25.0 mmol/L      Calcium 8.6 mg/dL      eGFR Non African Amer 124 mL/min/1.73      BUN/Creatinine Ratio 32.3     Anion Gap 13.0 mmol/L     Narrative:      GFR Normal >60  Chronic Kidney Disease <60  Kidney Failure <15      CBC (No Diff) [[id number]]  (Abnormal) Collected: 10/08/21 0533    Specimen: Blood Updated: 10/08/21 0554     WBC 13.00 10*3/mm3      RBC 3.76 10*6/mm3      Hemoglobin 11.1 g/dL      Hematocrit 33.1 %      MCV 87.9 fL      MCH 29.5 pg      MCHC 33.5 g/dL      RDW 14.6 %      RDW-SD 44.2 fl      MPV 7.5 fL      Platelets 300 10*3/mm3     Basic Metabolic Panel [[id number]]  (Abnormal) Collected: 10/08/21 0042    Specimen: Blood Updated: 10/08/21 0114     Glucose 128 mg/dL      BUN 22 mg/dL      Creatinine 0.65 mg/dL      Sodium 127 mmol/L      Potassium 3.6 mmol/L      Chloride 89 mmol/L      CO2 26.0 mmol/L      Calcium 8.3 mg/dL      eGFR Non African Amer 117 mL/min/1.73      BUN/Creatinine Ratio 33.8     Anion Gap 12.0 mmol/L     Narrative:      GFR Normal >60  Chronic Kidney Disease <60  Kidney Failure <15      Blood Culture - Blood, Arm, Right [[id number]] Collected: 10/05/21 2001    Specimen: Blood from Arm, Right Updated: 10/07/21 2015     Blood Culture No growth at 2 days    Blood Culture - Blood, Arm, Left [[id number]] Collected: 10/05/21 1951    Specimen: Blood from Arm, Left Updated: 10/07/21 2015     Blood Culture No growth at 2 days    Basic Metabolic Panel [[id number]]   (Abnormal) Collected: 10/07/21 1902    Specimen: Blood Updated: 10/07/21 1940     Glucose 145 mg/dL      BUN 22 mg/dL      Creatinine 0.66 mg/dL      Sodium 129 mmol/L      Potassium 3.7 mmol/L      Comment: Slight hemolysis detected by analyzer. Results may be affected.        Chloride 91 mmol/L      CO2 24.0 mmol/L      Calcium 8.3 mg/dL      eGFR Non African Amer 115 mL/min/1.73      BUN/Creatinine Ratio 33.3     Anion Gap 14.0 mmol/L     Narrative:      GFR Normal >60  Chronic Kidney Disease <60  Kidney Failure <15            PENDING RESULTS: Right lung biopsy report is pending    IMAGING REVIEWED:  US Liver    Result Date: 10/7/2021  Normal hepatic ultrasound.  Electronically Signed By-Opal Perales MD On:10/7/2021 9:50 AM This report was finalized on [id number] by  Opal Perales MD.    XR Chest 1 View    Result Date: 10/7/2021  No visible pneumothorax status post right lower lobe lung mass biopsy earlier today.  Electronically Signed By-Opal Perales MD On:10/7/2021 10:17 AM This report was finalized on [id number] by  Opal Perales MD.    CT Needle Biopsy Lung    Result Date: 10/7/2021  Technically successful biopsy of the pleural-based mass at the right lung base. Initial preliminary interpretation suggest dense fibrous tissue. Final path results are pending. Postprocedure chest radiograph is pending.  Electronically Signed By-Aly Brandon MD On:10/7/2021 10:07 AM This report was finalized on [id number] by  Aly Brandon MD.      I have reviewed the patient's labs, imaging, reports, and other clinician documentation.    Assessment/Plan   ASSESSMENT:  1. Right lung lower lobe mass, characterized as hypodense lesion: Patient had a biopsy the biopsy report is pending.  Attempted aspiration of this mass was not successful and it suggestive of solid or thick purulent effusion.  He is currently receiving antibiotics with ceftriaxone.  2. Anemia: He has normochromic normocytic anemia.  I will request  iron profile, B12/folate level and TSH level.  3. Elevated LFTs: There is no evidence of metastatic disease in the liver based on the imaging.  Hepatitis profile is negative.  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 Follow lung biopsy report  2. Serum iron, TIBC, ferritin, reticulocyte count, B12/folate level, free kappa/lambda light chain ratio, serum immunofixation.            I discussed the patients findings and my recommendations with patient.

## 2021-10-08 NOTE — PLAN OF CARE
Goal Outcome Evaluation:  Plan of Care Reviewed With: patient        Progress: no change  Pt is a 86 y.o. male presented to [facility] on 10/5/21 with acute mental status changes with lethargy, hypotension, and electrolyte disturbances with evidence of community-acquired pneumonia and acute lung mass. Pt PMH includes back pain, degeneration of lumbar/lumbosacral intervertebral disc, narrowing of spinal canal. Pt lives at home alone in single story home with no steps to enter. Pt reports PLOF independent with all ADLs, has assistance from daughter who lives nearby if necessary. He has a walker and a wheelchair at home but reports never using them. Pt required min A to sit EOB, able to maintain seated balance with UE support. Pt min A to stand and CGA for 20 feet ambulation with RW, v/c for staying close to walker and increased step length. Pt unable to progress further d/t fatigue and pain in neck and back. Patient is not safe to return home alone at this time. Recommending IP rehab at due to decline from baseline function.

## 2021-10-08 NOTE — PLAN OF CARE
Goal Outcome Evaluation:  Plan of Care Reviewed With: patient        Progress: improving  Outcome Summary: Pt is high falls risk. Tissue pathology from lung biopsy still pending. Pt has c/o neck and back pain this shift which he claims is not new for him. Pt lives at home. Vitals stable this shift. No new changes.

## 2021-10-08 NOTE — PROGRESS NOTES
Daily Progress Note        Sepsis (HCC)      Assessment    Right lower lobe mass which was not seen on imaging in 2015 suspicious for malignancy    Generalized weakness and lethargy and fatigue  Hyponatremia possible related to lung mass  COPD  Possible pneumonia with leukocytosis   hypokalemia  UTI     Hepatic steatosis  Hepatic transaminase derangement  -Elevated liver enzymes     Recommendations:    Hepatitis panel    s/p ultrasound-guided biopsy of right lower lobe mass with interventional radiology 10/7/2021  Multiple 18 g cores obtained of RLL pleural based mass. Not clearly malignant.   Final path pending     Antibiotic Rocephin  Bronchodilator\inhaled corticosteroid  DVT prophylaxis Lovenox              LOS: 2 days     Subjective         Objective     Vital signs for last 24 hours:  Vitals:    10/08/21 0600 10/08/21 0726 10/08/21 0729 10/08/21 1022   BP: 114/62   130/64   BP Location: Left arm   Right arm   Patient Position: Lying   Lying   Pulse: 102 91  95   Resp: 21 18 18 15   Temp: 97.8 °F (36.6 °C)   97.4 °F (36.3 °C)   TempSrc: Axillary   Axillary   SpO2: 95% 96%  93%   Weight:       Height:           Intake/Output last 3 shifts:  I/O last 3 completed shifts:  In: 170 [P.O.:120; I.V.:50]  Out: 1350 [Urine:1350]  Intake/Output this shift:  I/O this shift:  In: -   Out: 200 [Urine:200]      Radiology  Imaging Results (Last 24 Hours)     ** No results found for the last 24 hours. **          Labs:  Results from last 7 days   Lab Units 10/08/21  0533   WBC 10*3/mm3 13.00*   HEMOGLOBIN g/dL 11.1*   HEMATOCRIT % 33.1*   PLATELETS 10*3/mm3 300     Results from last 7 days   Lab Units 10/08/21  0533   SODIUM mmol/L 127*   POTASSIUM mmol/L 3.5   CHLORIDE mmol/L 89*   CO2 mmol/L 25.0   BUN mg/dL 20   CREATININE mg/dL 0.62*   CALCIUM mg/dL 8.6   BILIRUBIN mg/dL 0.9   ALK PHOS U/L 74   ALT (SGPT) U/L 881*   AST (SGOT) U/L 965*   GLUCOSE mg/dL 103*         Results from last 7 days   Lab Units 10/08/21  0533  10/07/21  0547 10/06/21  0906   ALBUMIN g/dL 2.50* 2.60* 3.00*     Results from last 7 days   Lab Units 10/05/21  1951   CK TOTAL U/L 97   TROPONIN T ng/mL <0.010         Results from last 7 days   Lab Units 10/06/21  0906   MAGNESIUM mg/dL 1.5*     Results from last 7 days   Lab Units 10/05/21  1951   INR  1.18*   APTT seconds 30.8     Results from last 7 days   Lab Units 10/05/21  1951   TSH uIU/mL 1.420           Meds:   SCHEDULE  aspirin, 81 mg, Oral, Daily  atorvastatin, 20 mg, Oral, Nightly  budesonide, 0.5 mg, Nebulization, BID - RT  cefTRIAXone, 1 g, Intravenous, Q24H  dilTIAZem CD, 180 mg, Oral, Q24H  enoxaparin, 40 mg, Subcutaneous, Daily  ipratropium-albuterol, 3 mL, Nebulization, 4x Daily - RT  tolvaptan, 15 mg, Oral, Once      Infusions     PRNs  HYDROcodone-acetaminophen  •  magnesium sulfate **OR** magnesium sulfate **OR** magnesium sulfate  •  potassium chloride **OR** potassium chloride **OR** potassium chloride  •  [COMPLETED] Insert peripheral IV **AND** sodium chloride  •  tiZANidine    Physical Exam:  Physical Exam  Vitals reviewed.   Pulmonary:      Breath sounds: Rhonchi present.   Skin:     General: Skin is warm and dry.   Neurological:      Mental Status: He is alert.         ROS  Review of Systems   Respiratory: Positive for cough and shortness of breath.    Neurological: Positive for headaches.       Reviewed the patient's new clinical results    Electronically signed by DAVID Quigley

## 2021-10-08 NOTE — CASE MANAGEMENT/SOCIAL WORK
Continued Stay Note  MATILDE Reeves     Patient Name: Prabhjot Roldan  MRN: [id number]  Today's Date: 10/8/2021    Admit Date: 10/5/2021    Discharge Plan     Row Name 10/08/21 0827       Plan    Plan  From home, PT ordered, watch for IV antibiotic need    Plan Comments  d/c barries: Pulmonolgy and renal following        Phone communication or documentation only - no physical contact with patient or family.        Melva Mariscal RN

## 2021-10-08 NOTE — PROGRESS NOTES
"NEPHROLOGY PROGRESS NOTE------[facility]    [facility]  [phone]  Uche Mojica MD      Patient Care Team:  Herve Baez MD as PCP - General (Family Medicine)  Uche Mojica MD as Consulting Physician (Nephrology)  Estefania Sanchez MD as Consulting Physician (Hematology and Oncology)      Provider:  Uche Mojica MD  Patient Name: Prabhjot Roldan  :  1935    SUBJECTIVE:  F/U Hyponatremia  No chest pain or SOA  Feels better    Medication:  aspirin, 81 mg, Oral, Daily  atorvastatin, 20 mg, Oral, Nightly  budesonide, 0.5 mg, Nebulization, BID - RT  cefTRIAXone, 1 g, Intravenous, Q24H  dilTIAZem CD, 180 mg, Oral, Q24H  enoxaparin, 40 mg, Subcutaneous, Daily  ipratropium-albuterol, 3 mL, Nebulization, 4x Daily - RT           OBJECTIVE    Vital Sign Min/Max for last 24 hours  Temp  Min: 97.4 °F (36.3 °C)  Max: 98.5 °F (36.9 °C)   BP  Min: 92/54  Max: 130/64   Pulse  Min: 86  Max: 102   Resp  Min: 15  Max: 26   SpO2  Min: 91 %  Max: 98 %   No data recorded   Weight  Min: 84.4 kg (186 lb)  Max: 84.4 kg (186 lb)     Flowsheet Rows      First Filed Value   Admission Height  175.3 cm (69\") Documented at 10/05/2021 1928   Admission Weight  86.2 kg (190 lb) Documented at 10/05/2021 1928          I/O this shift:  In: -   Out: 200 [Urine:200]  I/O last 3 completed shifts:  In: 170 [P.O.:120; I.V.:50]  Out: 1350 [Urine:1350]    Physical Exam:  General Appearance: alert, appears stated age and cooperative  Head: normocephalic, without obvious abnormality and atraumatic  Eyes: conjunctivae and sclerae normal and no icterus  Neck: supple and no JVD  Lungs: clear to auscultation and respirations regular  Heart: regular rhythm & normal rate and normal S1, S2  Chest: Wall no abnormalities observed  Abdomen: normal bowel sounds and soft non-tender  Extremities: moves extremities well, no edema, no cyanosis and no redness  Skin: no bleeding, bruising or " rash, turgor normal, color normal and no lesions noted  Neurologic: Alert, and oriented. No focal deficits    Labs:    WBC WBC   Date Value Ref Range Status   10/08/2021 13.00 (H) 3.40 - 10.80 10*3/mm3 Final   10/07/2021 15.60 (H) 3.40 - 10.80 10*3/mm3 Final   10/06/2021 19.60 (H) 3.40 - 10.80 10*3/mm3 Final   10/05/2021 14.20 (H) 3.40 - 10.80 10*3/mm3 Final      HGB Hemoglobin   Date Value Ref Range Status   10/08/2021 11.1 (L) 13.0 - 17.7 g/dL Final   10/07/2021 11.7 (L) 13.0 - 17.7 g/dL Final   10/06/2021 12.6 (L) 13.0 - 17.7 g/dL Final   10/05/2021 11.7 (L) 13.0 - 17.7 g/dL Final      HCT Hematocrit   Date Value Ref Range Status   10/08/2021 33.1 (L) 37.5 - 51.0 % Final   10/07/2021 34.3 (L) 37.5 - 51.0 % Final   10/06/2021 36.9 (L) 37.5 - 51.0 % Final   10/05/2021 33.8 (L) 37.5 - 51.0 % Final      Platlets No results found for: LABPLAT   MCV MCV   Date Value Ref Range Status   10/08/2021 87.9 79.0 - 97.0 fL Final   10/07/2021 86.8 79.0 - 97.0 fL Final   10/06/2021 86.4 79.0 - 97.0 fL Final   10/05/2021 86.3 79.0 - 97.0 fL Final          Sodium Sodium   Date Value Ref Range Status   10/08/2021 127 (L) 136 - 145 mmol/L Final   10/08/2021 127 (L) 136 - 145 mmol/L Final   10/08/2021 127 (L) 136 - 145 mmol/L Final   10/07/2021 129 (L) 136 - 145 mmol/L Final   10/07/2021 125 (L) 136 - 145 mmol/L Final   10/07/2021 124 (L) 136 - 145 mmol/L Final   10/06/2021 123 (L) 136 - 145 mmol/L Final   10/05/2021 120 (L) 136 - 145 mmol/L Final      Potassium Potassium   Date Value Ref Range Status   10/08/2021 3.6 3.5 - 5.2 mmol/L Final     Comment:     Slight hemolysis detected by analyzer. Results may be affected.   10/08/2021 3.5 3.5 - 5.2 mmol/L Final     Comment:     Slight hemolysis detected by analyzer. Results may be affected.   10/08/2021 3.6 3.5 - 5.2 mmol/L Final   10/07/2021 3.7 3.5 - 5.2 mmol/L Final     Comment:     Slight hemolysis detected by analyzer. Results may be affected.   10/07/2021 3.6 3.5 - 5.2 mmol/L  Final     Comment:     Slight hemolysis detected by analyzer. Results may be affected.   10/07/2021 3.7 3.5 - 5.2 mmol/L Final     Comment:     Slight hemolysis detected by analyzer. Results may be affected.   10/06/2021 3.1 (L) 3.5 - 5.2 mmol/L Final   10/05/2021 2.5 (C) 3.5 - 5.2 mmol/L Final      Chloride Chloride   Date Value Ref Range Status   10/08/2021 87 (L) 98 - 107 mmol/L Final   10/08/2021 89 (L) 98 - 107 mmol/L Final   10/08/2021 89 (L) 98 - 107 mmol/L Final   10/07/2021 91 (L) 98 - 107 mmol/L Final   10/07/2021 85 (L) 98 - 107 mmol/L Final   10/07/2021 87 (L) 98 - 107 mmol/L Final   10/06/2021 82 (L) 98 - 107 mmol/L Final   10/05/2021 75 (L) 98 - 107 mmol/L Final      CO2 CO2   Date Value Ref Range Status   10/08/2021 26.0 22.0 - 29.0 mmol/L Final   10/08/2021 25.0 22.0 - 29.0 mmol/L Final   10/08/2021 26.0 22.0 - 29.0 mmol/L Final   10/07/2021 24.0 22.0 - 29.0 mmol/L Final   10/07/2021 26.0 22.0 - 29.0 mmol/L Final   10/07/2021 24.0 22.0 - 29.0 mmol/L Final   10/06/2021 25.0 22.0 - 29.0 mmol/L Final   10/05/2021 21.0 (L) 22.0 - 29.0 mmol/L Final      BUN BUN   Date Value Ref Range Status   10/08/2021 21 8 - 23 mg/dL Final   10/08/2021 20 8 - 23 mg/dL Final   10/08/2021 22 8 - 23 mg/dL Final   10/07/2021 22 8 - 23 mg/dL Final   10/07/2021 22 8 - 23 mg/dL Final   10/07/2021 22 8 - 23 mg/dL Final   10/06/2021 14 8 - 23 mg/dL Final   10/05/2021 16 8 - 23 mg/dL Final      Creatinine Creatinine   Date Value Ref Range Status   10/08/2021 0.66 (L) 0.76 - 1.27 mg/dL Final   10/08/2021 0.62 (L) 0.76 - 1.27 mg/dL Final   10/08/2021 0.65 (L) 0.76 - 1.27 mg/dL Final   10/07/2021 0.66 (L) 0.76 - 1.27 mg/dL Final   10/07/2021 0.65 (L) 0.76 - 1.27 mg/dL Final   10/07/2021 0.65 (L) 0.76 - 1.27 mg/dL Final   10/06/2021 0.67 (L) 0.76 - 1.27 mg/dL Final   10/05/2021 0.96 0.76 - 1.27 mg/dL Final      Calcium Calcium   Date Value Ref Range Status   10/08/2021 8.8 8.6 - 10.5 mg/dL Final   10/08/2021 8.6 8.6 - 10.5 mg/dL  Final   10/08/2021 8.3 (L) 8.6 - 10.5 mg/dL Final   10/07/2021 8.3 (L) 8.6 - 10.5 mg/dL Final   10/07/2021 8.2 (L) 8.6 - 10.5 mg/dL Final   10/07/2021 8.4 (L) 8.6 - 10.5 mg/dL Final   10/06/2021 8.6 8.6 - 10.5 mg/dL Final   10/05/2021 8.9 8.6 - 10.5 mg/dL Final      PO4 No components found for: PO4   Albumin Albumin   Date Value Ref Range Status   10/08/2021 2.50 (L) 3.50 - 5.20 g/dL Final   10/07/2021 2.60 (L) 3.50 - 5.20 g/dL Final   10/06/2021 3.00 (L) 3.50 - 5.20 g/dL Final   10/05/2021 3.30 (L) 3.50 - 5.20 g/dL Final      Magnesium Magnesium   Date Value Ref Range Status   10/06/2021 1.5 (L) 1.6 - 2.4 mg/dL Final   10/05/2021 1.5 (L) 1.6 - 2.4 mg/dL Final      Uric Acid No components found for: URIC ACID     Imaging Results (Last 72 Hours)     Procedure Component Value Units Date/Time    CT Needle Biopsy Lung [[id number]] Collected: 10/07/21 1005     Updated: 10/07/21 1127    Narrative:      DATE OF EXAM:  10/7/2021 9:27 AM     PROCEDURE:  CT NEEDLE BIOPSY LUNG-     INDICATIONS:  Right lower lobe lung mass; A41.9-Sepsis, unspecified organism;  L22-Diaper dermatitis; J18.9-Pneumonia, unspecified organism;  E87.1-Emwb-qzgvqjwczo and hyponatremia; E87.6-Hypokalemia;  R53.1-Weakness; R91.8-Other nonspecific abnormal finding of lung field     COMPARISON:  No Comparisons Available     FLUOROSCOPIC TIME:  CT fluoroscopy time 2.61 seconds     PHYSICIAN MONITORED CONSCIOUS SEDATION TIME:  15 minutes     CONTRAST MEDIA:  None     TECHNIQUE:   The patient's medications were reviewed prior to the procedure. The  procedure, risks and alternatives were discussed and informed written  consent was obtained. Maximal sterile barrier technique was utilized  throughout the procedure. The patient was placed in the right lateral  decubitus position and preliminary images were obtained. An appropriate  site was chosen for biopsy posteriorly over the right lower chest wall.  The skin was marked prepped and draped. IV conscious sedation  was  administered consisting of Versed and fentanyl. The patient was  monitored by the IR nurse during the entire procedure. Utilizing maximal  sterile barrier technique and under local anesthesia a 17-gauge guide  needle was advanced incrementally into this area. The needle was  aspirated and no purulent fluid could be obtained. Multiple 18-gauge  core specimens were then obtained. These were reviewed by the  pathologist and felt to represent reactive fibrous tissue. Multiple  areas were sampled. These were placed in formalin. The needle was  removed and hemostasis achieved.          Impression:      Technically successful biopsy of the pleural-based mass at the right  lung base. Initial preliminary interpretation suggest dense fibrous  tissue. Final path results are pending. Postprocedure chest radiograph  is pending.     Electronically Signed By-Aly Brandon MD On:10/7/2021 10:07 AM  This report was finalized on [id number] by  Aly Brandon MD.    XR Chest 1 View [[id number]] Collected: 10/07/21 1015     Updated: 10/07/21 1127    Narrative:      DATE OF EXAM:  10/7/2021 10:12 AM     PROCEDURE:  XR CHEST 1 VW-     INDICATIONS:  s/p right lung biopsy; A41.9-Sepsis, unspecified organism; L22-Diaper  dermatitis; J18.9-Pneumonia, unspecified organism; E87.5-Pzvs-lkrcfjiknz  and hyponatremia; E87.6-Hypokalemia; R53.1-Weakness; R91.8-Other  nonspecific abnormal finding of lung field       COMPARISON:  AP portable chest 10/5/2021. CT-guided needle biopsy of right lung Mass.  10/7/2021 at 9:44 AM.     TECHNIQUE:   Single radiographic view of the chest was obtained.     FINDINGS:  No pneumothorax is seen. Right lower lobe convex masslike density at the  right lateral costophrenic angle is again noted. Small right pleural  effusion is seen. Convex opacity in the medial left base corresponds to  focal eventration of the left hemidiaphragm, which is seen to better  advantage on prior CT chest study. Interstitial thickening or  fibrotic  changes are thought to be present in both lungs. Heart size is normal.  Degenerative endplate spurring in the thoracic spine. Moderately  advanced degenerative changes of both shoulders.                   Impression:      No visible pneumothorax status post right lower lobe lung mass biopsy  earlier today.     Electronically Signed By-Opal Perales MD On:10/7/2021 10:17 AM  This report was finalized on [id number] by  Opal Perales MD.    US Liver [[id number]] Collected: 10/07/21 0948     Updated: 10/07/21 0952    Narrative:      DATE OF EXAM:  10/7/2021 9:29 AM     PROCEDURE:  US LIVER-     INDICATIONS:  Hepatic transaminase derangement; A41.9-Sepsis, unspecified organism;  L22-Diaper dermatitis; J18.9-Pneumonia, unspecified organism;  E87.6-Lxjz-xlrxjgzwbm and hyponatremia; E87.6-Hypokalemia;  R53.1-Weakness; R91.8-Other nonspecific abnormal finding of lung field     COMPARISON:  CT abdomen and pelvis 10/5/2021.     TECHNIQUE:   Grayscale and color Doppler ultrasound evaluation of the limited abdomen  was performed.     FINDINGS:  The liver size is normal, 16.3 cm in length. The liver maintains a  normal echotexture. There are no focal liver lesions. The liver does not  appear grossly cirrhotic or steatotic. The main portal vein is patent.  Hepatic veins are patent. Common bile duct caliber is normal, 4 mm. No  intrahepatic biliary ductal dilation is identified.        Impression:      Normal hepatic ultrasound.     Electronically Signed By-Opal Perales MD On:10/7/2021 9:50 AM  This report was finalized on [id number] by  Opal Perales MD.    CT Head Without Contrast [[id number]] Collected: 10/05/21 2335     Updated: 10/05/21 2338    Narrative:      EXAMINATION: CT HEAD WO CONTRAST    DATE: 10/5/2021 10:28 PM     INDICATION: Altered mental status. Dizziness and weakness.     COMPARISON: None available.     TECHNIQUE: Thin section noncontrast axial images were obtained through the head. Coronal  reformatted images were created.  CT dose lowering techniques were used, to include: automated exposure control, adjustment for patient size, and or use of iterative   reconstruction    FINDINGS:  Intracranial contents:    Generalized parenchymal volume loss without lobar predominance. No hydrocephalus.   A few foci of hypoattenuation within periventricular white matter most commonly represent chronic microangiopathy.  There is no midline shift or mass effect. No   hemorrhage, mass lesion, or evidence of evolving large territorial infarct. Intracranial atherosclerosis.    Bones and extracranial soft tissues:    Normal calvarium.  Orbits unremarkable. The visualized paranasal sinuses are clear. No mastoid or middle ear effusions.        Impression:        No acute intracranial abnormality.     Electronically signed by:  Lenny Mckee    10/5/2021 9:37 PM    CT Abdomen Pelvis With Contrast [[id number]] Collected: 10/05/21 2330     Updated: 10/05/21 2331    Narrative:      Exam: CT thorax with contrast    Date: October 5, 2021    History: Generalized weakness, hypotension, dehydration, shortness of breath    Comparison: None available    Technique: Contiguous axial CT images obtained of the thorax, abdomen and pelvis following the uneventful intravenous administration of 100 mL Isovue-370 contrast. Delayed imaging was obtained. Additionally, sagittal and coronal reformatted images were   obtained. CT dose lowering techniques were used, to include: automated exposure control, adjustment for patient size, and or use of iterative reconstruction.    Findings:    Thoracic aorta: There are diffuse atherosclerotic changes. There is no aneurysm or dissection.    HEART: The heart is borderline in size. There are coronary artery calcifications.    Pulmonary arteries: The pulmonary arteries are well visualized. There is no filling defect to suggest an acute pulmonary embolus.    Mediastinum: There is an enlarged subcarinal lymph  node measuring 1.8 cm in short axis.    Lung parenchyma: There is a subtle background of emphysema. There are reticulonodular type infiltrates in the right upper lobe. There is interlobular septal thickening in both lungs. There are nodular type groundglass and confluent groundglass airspace   opacities in the left upper lobe and lingula. There is a large low-density mass in the right lung base measuring up to 9.5 x 5.3 cm. This is well visualized on series 2 image 75. There is pleural thickening along the lateral aspect of the right lung.    CT ABDOMEN:  Liver: The liver is mildly hypodense. The liver demonstrates a very subtle micronodular contour.    Spleen: Normal.    Pancreas: Normal.    Adrenal glands: Normal.    Gallbladder: The gallbladder is mildly distended.    Kidneys: The kidneys demonstrate symmetric enhancement. There is no hydronephrosis or obstructive uropathy.    Abdominal mesentery: There is no pathologic intra-abdominal mass or adenopathy.    Bowel loops: There is colonic diverticulosis. The appendix is normal. There is no small bowel obstruction.    CT PELVIS:  There is a urinary bladder diverticulum along the left lateral aspect of the urinary bladder. There is a questionable undescended right testicle. This can be correlated with physical exam.    Abdominal aorta: There are severe atherosclerotic changes.    Osseous structures: There is levoconvex scoliotic curvature of the lumbar spine. There are moderate or moderate to severe degenerative changes in the spine. There are severe degenerative changes in the left shoulder and moderate degenerative changes in   the right shoulder. No acute fractures are identified.      Impression:      1. There is a large low-density mass in the right lung base measuring 9.5 x 5.3 cm. This is favored to represent a neoplastic process which must be considered and excluded. This lesion would be amenable to CT-guided biopsy as determined clinically.   Alternatively,  a PET/CT study could be obtained for better characterization. Follow-up is recommended.  2. Reticulonodular type infiltrates in the right upper lobe with additional nodular type groundglass and confluent groundglass airspace opacities in the left upper lobe and lingula. Findings are most likely related to infectious bronchopneumonia,   possibly related to viral pneumonia and/or aspiration. COVID pneumonia should be considered.  3. Element of interlobular septal thickening may represent early pulmonary edema.  4. Emphysema.  5. Advanced coronary artery calcifications.  6. Enlarged subcarinal lymph node measuring 1.8 cm in short axis. Metastatic disease must be excluded.  7. Hepatic steatosis. The liver also demonstrates a very subtle micronodular contour which may represent liver disease or early cirrhosis. Correlation with hepatic function is recommended.  8. Diverticulosis.  9. Severe atherosclerotic disease.  10. Additional incidental and chronic findings as above.    *[employer] GUIDELINES:  Low risk patient:  <6mm - Low Risk, no further f/u  >6-8mm - low dose CT 6-12 mo, then 2nd f/u CT at 18-24mo  >8mm - low dose CT 3,9,24mo OR PET/CT OR Biopsy    High Risk Patient:  <6mm - optional CT at 12 mo  >6-8mm - low dose CT 6-12 mo, then 2nd f/u CT at 18-24mo  >8mm - low dose CT at 3 mo, OR PET/CT OR Biopsy    MULTIPLE NODULES:  Low Risk Patient:  <6mm - Low Risk, no further f/u  >6-8mm - low dose CT 3-6 mo, then 2nd f/u CT at 18-24mo  >8mm - low dose CT 3-6 mo, then 2nd f/u CT at 18-24mo    High Risk Patient:  <6mm - High risk, multiple nodules, optional CT at 12 mo  >6-8mm - low dose CT 3-6 mo, then 2nd f/u CT at 18-24mo  >8mm - low dose CT 3-6 mo, then 2nd f/u CT at 18-24mo        Slot 67    Electronically signed by:  Arcenio Vanegas M.D.    10/5/2021 9:30 PM    CT Chest With Contrast Diagnostic [[id number]] Collected: 10/05/21 2318     Updated: 10/05/21 2331    Narrative:      Exam: CT thorax with  contrast    Date: October 5, 2021    History: Generalized weakness, hypotension, dehydration, shortness of breath    Comparison: None available    Technique: Contiguous axial CT images obtained of the thorax, abdomen and pelvis following the uneventful intravenous administration of 100 mL Isovue-370 contrast. Delayed imaging was obtained. Additionally, sagittal and coronal reformatted images were   obtained. CT dose lowering techniques were used, to include: automated exposure control, adjustment for patient size, and or use of iterative reconstruction.    Findings:    Thoracic aorta: There are diffuse atherosclerotic changes. There is no aneurysm or dissection.    HEART: The heart is borderline in size. There are coronary artery calcifications.    Pulmonary arteries: The pulmonary arteries are well visualized. There is no filling defect to suggest an acute pulmonary embolus.    Mediastinum: There is an enlarged subcarinal lymph node measuring 1.8 cm in short axis.    Lung parenchyma: There is a subtle background of emphysema. There are reticulonodular type infiltrates in the right upper lobe. There is interlobular septal thickening in both lungs. There are nodular type groundglass and confluent groundglass airspace   opacities in the left upper lobe and lingula. There is a large low-density mass in the right lung base measuring up to 9.5 x 5.3 cm. This is well visualized on series 2 image 75. There is pleural thickening along the lateral aspect of the right lung.    CT ABDOMEN:  Liver: The liver is mildly hypodense. The liver demonstrates a very subtle micronodular contour.    Spleen: Normal.    Pancreas: Normal.    Adrenal glands: Normal.    Gallbladder: The gallbladder is mildly distended.    Kidneys: The kidneys demonstrate symmetric enhancement. There is no hydronephrosis or obstructive uropathy.    Abdominal mesentery: There is no pathologic intra-abdominal mass or adenopathy.    Bowel loops: There is colonic  diverticulosis. The appendix is normal. There is no small bowel obstruction.    CT PELVIS:  There is a urinary bladder diverticulum along the left lateral aspect of the urinary bladder. There is a questionable undescended right testicle. This can be correlated with physical exam.    Abdominal aorta: There are severe atherosclerotic changes.    Osseous structures: There is levoconvex scoliotic curvature of the lumbar spine. There are moderate or moderate to severe degenerative changes in the spine. There are severe degenerative changes in the left shoulder and moderate degenerative changes in   the right shoulder. No acute fractures are identified.      Impression:      1. There is a large low-density mass in the right lung base measuring 9.5 x 5.3 cm. This is favored to represent a neoplastic process which must be considered and excluded. This lesion would be amenable to CT-guided biopsy as determined clinically.   Alternatively, a PET/CT study could be obtained for better characterization. Follow-up is recommended.  2. Reticulonodular type infiltrates in the right upper lobe with additional nodular type groundglass and confluent groundglass airspace opacities in the left upper lobe and lingula. Findings are most likely related to infectious bronchopneumonia,   possibly related to viral pneumonia and/or aspiration. COVID pneumonia should be considered.  3. Element of interlobular septal thickening may represent early pulmonary edema.  4. Emphysema.  5. Advanced coronary artery calcifications.  6. Enlarged subcarinal lymph node measuring 1.8 cm in short axis. Metastatic disease must be excluded.  7. Hepatic steatosis. The liver also demonstrates a very subtle micronodular contour which may represent liver disease or early cirrhosis. Correlation with hepatic function is recommended.  8. Diverticulosis.  9. Severe atherosclerotic disease.  10. Additional incidental and chronic findings as above.    *[employer] SOC[employer]ETY  GUIDELINES:  Low risk patient:  <6mm - Low Risk, no further f/u  >6-8mm - low dose CT 6-12 mo, then 2nd f/u CT at 18-24mo  >8mm - low dose CT 3,9,24mo OR PET/CT OR Biopsy    High Risk Patient:  <6mm - optional CT at 12 mo  >6-8mm - low dose CT 6-12 mo, then 2nd f/u CT at 18-24mo  >8mm - low dose CT at 3 mo, OR PET/CT OR Biopsy    MULTIPLE NODULES:  Low Risk Patient:  <6mm - Low Risk, no further f/u  >6-8mm - low dose CT 3-6 mo, then 2nd f/u CT at 18-24mo  >8mm - low dose CT 3-6 mo, then 2nd f/u CT at 18-24mo    High Risk Patient:  <6mm - High risk, multiple nodules, optional CT at 12 mo  >6-8mm - low dose CT 3-6 mo, then 2nd f/u CT at 18-24mo  >8mm - low dose CT 3-6 mo, then 2nd f/u CT at 18-24mo        Slot 67    Electronically signed by:  Arcenio Vanegas M.D.    10/5/2021 9:30 PM    XR Chest 1 View [[id number]] Collected: 10/05/21 2104     Updated: 10/05/21 2108    Narrative:      Examination: XR CHEST 1 VW-     Date of Exam: 10/5/2021 8:10 PM     Indication: Weakness.       Comparison: 05/20/2015     Technique: 1 view of the chest      FINDINGS:  The heart size is within normal. There is a lobulated opacity in the  inferior lateral right chest that measures about 5.5 cm; the lower  margin of this is obscured. There is some adjacent pleural thickening or  fluid in the inferior lateral right chest. These findings have developed  since the 2015 exam. There may be some minor patchy airspace opacity in  the lateral aspect of the left midlung. There is aortic calcification.  No significant bone abnormality.       Impression:      1. There is an abnormality in the right lower chest. Part of this has a  masslike configuration. It may be due to parenchymal parenchymal  disease, pleural disease, or both. A lung mass is possible. Further  evaluation with chest CT with contrast is recommended.  2. Question some minor opacity in the lateral left midlung which may be  due to pneumonia.     Electronically Signed By-Margarita Cooper  MD On:10/5/2021 9:06 PM  This report was finalized on [id number] by  Margarita Cooper MD.          Results for orders placed during the hospital encounter of 10/05/21    XR Chest 1 View    Narrative  DATE OF EXAM:  10/7/2021 10:12 AM    PROCEDURE:  XR CHEST 1 VW-    INDICATIONS:  s/p right lung biopsy; A41.9-Sepsis, unspecified organism; L22-Diaper  dermatitis; J18.9-Pneumonia, unspecified organism; E87.9-Buxk-qbdffsurhj  and hyponatremia; E87.6-Hypokalemia; R53.1-Weakness; R91.8-Other  nonspecific abnormal finding of lung field    COMPARISON:  AP portable chest 10/5/2021. CT-guided needle biopsy of right lung Mass.  10/7/2021 at 9:44 AM.    TECHNIQUE:  Single radiographic view of the chest was obtained.    FINDINGS:  No pneumothorax is seen. Right lower lobe convex masslike density at the  right lateral costophrenic angle is again noted. Small right pleural  effusion is seen. Convex opacity in the medial left base corresponds to  focal eventration of the left hemidiaphragm, which is seen to better  advantage on prior CT chest study. Interstitial thickening or fibrotic  changes are thought to be present in both lungs. Heart size is normal.  Degenerative endplate spurring in the thoracic spine. Moderately  advanced degenerative changes of both shoulders.    Impression  No visible pneumothorax status post right lower lobe lung mass biopsy  earlier today.    Electronically Signed By-Opal Perales MD On:10/7/2021 10:17 AM  This report was finalized on [id number] by  Opal Perales MD.      XR Chest 1 View    Narrative  Examination: XR CHEST 1 VW-    Date of Exam: 10/5/2021 8:10 PM    Indication: Weakness.    Comparison: 05/20/2015    Technique: 1 view of the chest    FINDINGS:  The heart size is within normal. There is a lobulated opacity in the  inferior lateral right chest that measures about 5.5 cm; the lower  margin of this is obscured. There is some adjacent pleural thickening or  fluid in the inferior  lateral right chest. These findings have developed  since the 2015 exam. There may be some minor patchy airspace opacity in  the lateral aspect of the left midlung. There is aortic calcification.  No significant bone abnormality.    Impression  1. There is an abnormality in the right lower chest. Part of this has a  masslike configuration. It may be due to parenchymal parenchymal  disease, pleural disease, or both. A lung mass is possible. Further  evaluation with chest CT with contrast is recommended.  2. Question some minor opacity in the lateral left midlung which may be  due to pneumonia.    Electronically Signed By-Margarita Cooper MD On:10/5/2021 9:06 PM  This report was finalized on [id number] by  Margarita Cooper MD.      Results for orders placed in visit on 04/14/21    SCANNED - IMAGING            ASSESSMENT / PLAN      Sepsis (HCC)    ·  Hypokalemia/hypomagnesemia-likley related to thiazides. we will replace.   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 Urine osm > 500, Jess 27. exacerbated on thiazides. TSH is normal.  No paraproteins   · pneumonia  · Hypertension-his blood pressure was low admission hold meds.    · Right lung mass-- biopsy/PET for further evaluation.  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

## 2021-10-08 NOTE — PROGRESS NOTES
LOS: 2 days   Patient Care Team:  Herve Baez MD as PCP - General (Family Medicine)  Uche Mojica MD as Consulting Physician (Nephrology)  Estefania Sanchez MD as Consulting Physician (Hematology and Oncology)    Subjective:  Complains of some urinary frequency    Objective:   Weak      Review of Systems:   Review of Systems   Constitutional: Positive for activity change.   Neurological: Positive for weakness.           Vital Signs  Temp:  [97.8 °F (36.6 °C)-98.5 °F (36.9 °C)] 97.8 °F (36.6 °C)  Heart Rate:  [] 91  Resp:  [15-21] 18  BP: ()/(54-77) 114/62    Physical Exam:  Physical Exam  Constitutional:       General: He is not in acute distress.  Cardiovascular:      Rate and Rhythm: Rhythm irregular.      Heart sounds: Normal heart sounds.   Pulmonary:      Breath sounds: Normal breath sounds.   Skin:     General: Skin is warm.          Radiology:  CT Head Without Contrast    Result Date: 10/5/2021  No acute intracranial abnormality. Electronically signed by:  Lenny Mckee  10/5/2021 9:37 PM    CT Chest With Contrast Diagnostic    Result Date: 10/5/2021  1. There is a large low-density mass in the right lung base measuring 9.5 x 5.3 cm. This is favored to represent a neoplastic process which must be considered and excluded. This lesion would be amenable to CT-guided biopsy as determined clinically. Alternatively, a PET/CT study could be obtained for better characterization. Follow-up is recommended. 2. Reticulonodular type infiltrates in the right upper lobe with additional nodular type groundglass and confluent groundglass airspace opacities in the left upper lobe and lingula. Findings are most likely related to infectious bronchopneumonia, possibly related to viral pneumonia and/or aspiration. COVID pneumonia should be considered. 3. Element of interlobular septal thickening may represent early pulmonary edema. 4. Emphysema. 5. Advanced coronary artery calcifications. 6.  Enlarged subcarinal lymph node measuring 1.8 cm in short axis. Metastatic disease must be excluded. 7. Hepatic steatosis. The liver also demonstrates a very subtle micronodular contour which may represent liver disease or early cirrhosis. Correlation with hepatic function is recommended. 8. Diverticulosis. 9. Severe atherosclerotic disease. 10. Additional incidental and chronic findings as above. *[employer] GUIDELINES: Low risk patient: <6mm - Low Risk, no further f/u >6-8mm - low dose CT 6-12 mo, then 2nd f/u CT at 18-24mo >8mm - low dose CT 3,9,24mo OR PET/CT OR Biopsy High Risk Patient: <6mm - optional CT at 12 mo >6-8mm - low dose CT 6-12 mo, then 2nd f/u CT at 18-24mo >8mm - low dose CT at 3 mo, OR PET/CT OR Biopsy MULTIPLE NODULES: Low Risk Patient: <6mm - Low Risk, no further f/u >6-8mm - low dose CT 3-6 mo, then 2nd f/u CT at 18-24mo >8mm - low dose CT 3-6 mo, then 2nd f/u CT at 18-24mo High Risk Patient: <6mm - High risk, multiple nodules, optional CT at 12 mo >6-8mm - low dose CT 3-6 mo, then 2nd f/u CT at 18-24mo >8mm - low dose CT 3-6 mo, then 2nd f/u CT at 18-24mo Slot 67 Electronically signed by:  Arcenio Vanegas M.D.  10/5/2021 9:30 PM    CT Abdomen Pelvis With Contrast    Result Date: 10/5/2021  1. There is a large low-density mass in the right lung base measuring 9.5 x 5.3 cm. This is favored to represent a neoplastic process which must be considered and excluded. This lesion would be amenable to CT-guided biopsy as determined clinically. Alternatively, a PET/CT study could be obtained for better characterization. Follow-up is recommended. 2. Reticulonodular type infiltrates in the right upper lobe with additional nodular type groundglass and confluent groundglass airspace opacities in the left upper lobe and lingula. Findings are most likely related to infectious bronchopneumonia, possibly related to viral pneumonia and/or aspiration. COVID pneumonia should be considered. 3. Element of  interlobular septal thickening may represent early pulmonary edema. 4. Emphysema. 5. Advanced coronary artery calcifications. 6. Enlarged subcarinal lymph node measuring 1.8 cm in short axis. Metastatic disease must be excluded. 7. Hepatic steatosis. The liver also demonstrates a very subtle micronodular contour which may represent liver disease or early cirrhosis. Correlation with hepatic function is recommended. 8. Diverticulosis. 9. Severe atherosclerotic disease. 10. Additional incidental and chronic findings as above. *[employer] GUIDELINES: Low risk patient: <6mm - Low Risk, no further f/u >6-8mm - low dose CT 6-12 mo, then 2nd f/u CT at 18-24mo >8mm - low dose CT 3,9,24mo OR PET/CT OR Biopsy High Risk Patient: <6mm - optional CT at 12 mo >6-8mm - low dose CT 6-12 mo, then 2nd f/u CT at 18-24mo >8mm - low dose CT at 3 mo, OR PET/CT OR Biopsy MULTIPLE NODULES: Low Risk Patient: <6mm - Low Risk, no further f/u >6-8mm - low dose CT 3-6 mo, then 2nd f/u CT at 18-24mo >8mm - low dose CT 3-6 mo, then 2nd f/u CT at 18-24mo High Risk Patient: <6mm - High risk, multiple nodules, optional CT at 12 mo >6-8mm - low dose CT 3-6 mo, then 2nd f/u CT at 18-24mo >8mm - low dose CT 3-6 mo, then 2nd f/u CT at 18-24mo Slot 67 Electronically signed by:  Arcenio Vanegas M.D.  10/5/2021 9:30 PM    US Liver    Result Date: 10/7/2021  Normal hepatic ultrasound.  Electronically Signed By-Opal Perales MD On:10/7/2021 9:50 AM This report was finalized on [id number] by  Opal Perales MD.    XR Chest 1 View    Result Date: 10/7/2021  No visible pneumothorax status post right lower lobe lung mass biopsy earlier today.  Electronically Signed By-Opal Perales MD On:10/7/2021 10:17 AM This report was finalized on [id number] by  Opal Perales MD.    XR Chest 1 View    Result Date: 10/5/2021  1. There is an abnormality in the right lower chest. Part of this has a masslike configuration. It may be due to parenchymal  parenchymal disease, pleural disease, or both. A lung mass is possible. Further evaluation with chest CT with contrast is recommended. 2. Question some minor opacity in the lateral left midlung which may be due to pneumonia.  Electronically Signed By-Margarita Cooper MD On:10/5/2021 9:06 PM This report was finalized on [id number] by  Margarita Cooper MD.    CT Needle Biopsy Lung    Result Date: 10/7/2021  Technically successful biopsy of the pleural-based mass at the right lung base. Initial preliminary interpretation suggest dense fibrous tissue. Final path results are pending. Postprocedure chest radiograph is pending.  Electronically Signed By-Aly Brandon MD On:10/7/2021 10:07 AM This report was finalized on [id number] by  Aly Brandon MD.         Results Review:     I reviewed the patient's new clinical results.  I reviewed the patient's new imaging results and agree with the interpretation.    Medication Review:   Scheduled Meds:aspirin, 81 mg, Oral, Daily  atorvastatin, 20 mg, Oral, Nightly  budesonide, 0.5 mg, Nebulization, BID - RT  cefTRIAXone, 1 g, Intravenous, Q24H  dilTIAZem CD, 180 mg, Oral, Q24H  enoxaparin, 40 mg, Subcutaneous, Daily  ipratropium-albuterol, 3 mL, Nebulization, 4x Daily - RT  tolvaptan, 15 mg, Oral, Once      Continuous Infusions:   PRN Meds:.HYDROcodone-acetaminophen  •  magnesium sulfate **OR** magnesium sulfate **OR** magnesium sulfate  •  potassium chloride **OR** potassium chloride **OR** potassium chloride  •  [COMPLETED] Insert peripheral IV **AND** sodium chloride  •  tiZANidine    Labs:    CBC    Results from last 7 days   Lab Units 10/08/21  0533 10/07/21  0546 10/06/21  0906 10/05/21  1951   WBC 10*3/mm3 13.00* 15.60* 19.60* 14.20*   HEMOGLOBIN g/dL 11.1* 11.7* 12.6* 11.7*   PLATELETS 10*3/mm3 300 317 316 392     BMP   Results from last 7 days   Lab Units 10/08/21  0533 10/08/21  0042 10/07/21  1902 10/07/21  1252 10/07/21  0547 10/06/21  0906 10/05/21  1951   SODIUM mmol/L  127* 127* 129* 125* 124* 123* 120*   POTASSIUM mmol/L 3.5 3.6 3.7 3.6 3.7 3.1* 2.5*   CHLORIDE mmol/L 89* 89* 91* 85* 87* 82* 75*   CO2 mmol/L 25.0 26.0 24.0 26.0 24.0 25.0 21.0*   BUN mg/dL 20 22 22 22 22 14 16   CREATININE mg/dL 0.62* 0.65* 0.66* 0.65* 0.65* 0.67* 0.96   GLUCOSE mg/dL 103* 128* 145* 103* 90 101* 178*   MAGNESIUM mg/dL  --   --   --   --   --  1.5* 1.5*     Cr Clearance Estimated Creatinine Clearance: 82.1 mL/min (A) (by C-G formula based on SCr of 0.62 mg/dL (L)).  Coag   Results from last 7 days   Lab Units 10/05/21  1951   INR  1.18*   APTT seconds 30.8     HbA1C No results found for: HGBA1C  Blood Glucose No results found for: POCGLU  Infection   Results from last 7 days   Lab Units 10/05/21  2001 10/05/21  1957 10/05/21  1951   BLOODCX  No growth at 2 days  --  No growth at 2 days   URINECX   --  No growth  --      CMP   Results from last 7 days   Lab Units 10/08/21  0533 10/08/21  0042 10/07/21  1902 10/07/21  1252 10/07/21  0547 10/06/21  0906 10/05/21  1951   SODIUM mmol/L 127* 127* 129* 125* 124* 123* 120*   POTASSIUM mmol/L 3.5 3.6 3.7 3.6 3.7 3.1* 2.5*   CHLORIDE mmol/L 89* 89* 91* 85* 87* 82* 75*   CO2 mmol/L 25.0 26.0 24.0 26.0 24.0 25.0 21.0*   BUN mg/dL 20 22 22 22 22 14 16   CREATININE mg/dL 0.62* 0.65* 0.66* 0.65* 0.65* 0.67* 0.96   GLUCOSE mg/dL 103* 128* 145* 103* 90 101* 178*   ALBUMIN g/dL 2.50*  --   --   --  2.60* 3.00* 3.30*   BILIRUBIN mg/dL 0.9  --   --   --  0.9 0.8 0.6   ALK PHOS U/L 74  --   --   --  77 88 83   AST (SGOT) U/L 965*  --   --   --  814* 260* 155*   ALT (SGPT) U/L 881*  --   --   --  592* 177* 92*   LIPASE U/L  --   --   --   --   --   --  47     UA    Results from last 7 days   Lab Units 10/05/21  1957   NITRITE UA  Negative   WBC UA /HPF 31-50*   BACTERIA UA /HPF None Seen   SQUAM EPITHEL UA /HPF 7-12*   URINECX  No growth     Radiology(recent) US Liver    Result Date: 10/7/2021  Normal hepatic ultrasound.  Electronically Signed By-Opal Perales MD  On:10/7/2021 9:50 AM This report was finalized on [id number] by  Opal Perales MD.    XR Chest 1 View    Result Date: 10/7/2021  No visible pneumothorax status post right lower lobe lung mass biopsy earlier today.  Electronically Signed By-Opal Perales MD On:10/7/2021 10:17 AM This report was finalized on [id number] by  Opal Perales MD.    CT Needle Biopsy Lung    Result Date: 10/7/2021  Technically successful biopsy of the pleural-based mass at the right lung base. Initial preliminary interpretation suggest dense fibrous tissue. Final path results are pending. Postprocedure chest radiograph is pending.  Electronically Signed By-Aly Brandon MD On:10/7/2021 10:07 AM This report was finalized on [id number] by  Aly Brandon MD.     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

## 2021-10-08 NOTE — THERAPY EVALUATION
Patient Name: Prabhjot Roldan  : 1935    MRN: [id number]                              Today's Date: 10/8/2021       Admit Date: 10/5/2021    Visit Dx:     ICD-10-CM ICD-9-CM   1. Sepsis, due to unspecified organism, unspecified whether acute organ dysfunction present (HCC)  A41.9 038.9     995.91   2. Ammonia dermatitis  L22 691.0   3. Pneumonia of both lungs due to infectious organism, unspecified part of lung  J18.9 483.8   4. Hyponatremia  E87.1 276.1   5. Hypokalemia  E87.6 276.8   6. Weakness  R53.1 780.79   7. Lung mass  R91.8 786.6     Patient Active Problem List   Diagnosis   • Back pain   • Degeneration of lumbar or lumbosacral intervertebral disc   • Encounter for follow-up examination after completed treatment for conditions other than malignant neoplasm   • Hand pain, right   • Herniated lumbar intervertebral disc   • Peripheral vascular disease (HCC)   • Spinal stenosis of lumbar region   • Thoracic or lumbosacral neuritis or radiculitis   • Umbilical hernia without obstruction or gangrene   • Vertigo   • Sepsis (HCC)     Past Medical History:   Diagnosis Date   • Arthritis    • Hyperlipidemia    • Hypertension    • Neck pain      Past Surgical History:   Procedure Laterality Date   • HERNIA REPAIR       General Information     Row Name 10/08/21 1042          Physical Therapy Time and Intention    Document Type  evaluation  -NK     Mode of Treatment  physical therapy  -NK     Row Name 10/08/21 1042          General Information    Patient Profile Reviewed  yes  -NK     Prior Level of Function  -- Pt has walker but rarely uses it; has manual wheelchair but never uses it  -NK     Existing Precautions/Restrictions  fall  -NK     Row Name 10/08/21 1042          Living Environment    Lives With  alone  -NK     Row Name 10/08/21 1042          Home Main Entrance    Number of Stairs, Main Entrance  none  -NK     Row Name 10/08/21 1042          Stairs Within Home, Primary    Number of Stairs, Within Home,  Primary  other (see comments) Pt has chair lift to go down to basement  -NK     Row Name 10/08/21 1042          Cognition    Orientation Status (Cognition)  oriented to;person;place;time;verbal cues/prompts needed for orientation  -NK     Row Name 10/08/21 1042          Safety Issues, Functional Mobility    Safety Issues Affecting Function (Mobility)  insight into deficits/self-awareness  -NK     Impairments Affecting Function (Mobility)  endurance/activity tolerance;shortness of breath;range of motion (ROM)  -NK       User Key  (r) = Recorded By, (t) = Taken By, (c) = Cosigned By    Initials Name Provider Type    NK Haleigh Craen, PT Student PT Student        Mobility     Row Name 10/08/21 1418          Bed Mobility    Bed Mobility  bed mobility (all) activities  -NK     All Activities, [place] (Bed Mobility)  minimum assist (75% patient effort)  -NK     Assistive Device (Bed Mobility)  bed rails  -NK     Row Name 10/08/21 1418          Bed-Chair Transfer    Bed-Chair [place] (Transfers)  contact guard  -NK     Assistive Device (Bed-Chair Transfers)  walker, front-wheeled  -NK     Row Name 10/08/21 1418          Sit-Stand Transfer    Sit-Stand [place] (Transfers)  contact guard  -NK     Assistive Device (Sit-Stand Transfers)  walker, front-wheeled  -NK     Row Name 10/08/21 1418          Gait/Stairs (Locomotion)    [place] Level (Gait)  contact guard  -NK     Assistive Device (Gait)  walker, front-wheeled  -NK     Distance in Feet (Gait)  20  -NK     Deviations/Abnormal Patterns (Gait)  gait speed decreased;antalgic  -NK       User Key  (r) = Recorded By, (t) = Taken By, (c) = Cosigned By    Initials Name Provider Type    NK Haleigh Crane, PT Student PT Student        Obj/Interventions     Row Name 10/08/21 1420          Range of Motion Comprehensive    General Range of Motion  no range of motion deficits identified  -NK     Row Name 10/08/21 1420          Strength Comprehensive (MMT)     General Manual Muscle Testing (MMT) Assessment  upper extremity strength deficits identified;lower extremity strength deficits identified  -NK     Comment, General Manual Muscle Testing (MMT) Assessment  Pt will global strength 4-/5  -NK     Row Name 10/08/21 1420          Balance    Balance Assessment  sitting static balance;sitting dynamic balance;standing static balance;standing dynamic balance  -NK     Static Sitting Balance  WFL  -NK     Dynamic Sitting Balance  mild impairment  -NK     Static Standing Balance  mild impairment  -NK     Dynamic Standing Balance  mild impairment  -NK       User Key  (r) = Recorded By, (t) = Taken By, (c) = Cosigned By    Initials Name Provider Type    NK Haleigh Crane, PT Student PT Student        Goals/Plan     Row Name 10/08/21 1430          Bed Mobility Goal 1 (PT)    Activity/Assistive Device (Bed Mobility Goal 1, PT)  bed mobility activities, all  -NK     [place] Level/Cues Needed (Bed Mobility Goal 1, PT)  independent  -NK     Time Frame (Bed Mobility Goal 1, PT)  long term goal (LTG);2 weeks  -NK     Row Name 10/08/21 1430          Transfer Goal 1 (PT)    Activity/Assistive Device (Transfer Goal 1, PT)  transfers, all  -NK     [place] Level/Cues Needed (Transfer Goal 1, PT)  independent  -NK     Time Frame (Transfer Goal 1, PT)  long term goal (LTG);2 weeks  -NK     Row Name 10/08/21 1430          Gait Training Goal 1 (PT)    Activity/Assistive Device (Gait Training Goal 1, PT)  gait (walking locomotion)  -NK     [place] Level (Gait Training Goal 1, PT)  modified independence  -NK     Distance (Gait Training Goal 1, PT)  100  -NK     Time Frame (Gait Training Goal 1, PT)  long term goal (LTG);2 weeks  -NK       User Key  (r) = Recorded By, (t) = Taken By, (c) = Cosigned By    Initials Name Provider Type    Haleigh Linares, PT Student PT Student        Clinical Impression     Row Name 10/08/21 1421          Pain    Additional Documentation  Pain Scale:  FACES Pre/Post-Treatment (Group)  -NK     Row Name 10/08/21 1421          Pain Scale: FACES Pre/Post-Treatment    Pain: FACES Scale, Pretreatment  2-->hurts little bit  -NK     Posttreatment Pain Rating  4-->hurts little more  -NK     Pain Location  back;neck  -NK     Row Name 10/08/21 1430 10/08/21 1421       Plan of Care Review    Plan of Care Reviewed With  --  patient  -NK    Progress  --  no change  -NK    Outcome Summary  Pt is a 86 y.o. male presented to [facility] on 10/5/21 with acute mental status changes with lethargy, hypotension, and electrolyte disturbances with evidence of community-acquired pneumonia and acute lung mass. Pt PMH includes back pain, degeneration of lumbar/lumbosacral intervertebral disc, narrowing of spinal canal. Pt lives at home alone in single story home with no steps to enter. Pt reports PLOF independent with all ADLs, has assistance from daughter who lives nearby if necessary. He has a walker and a wheelchair at home but reports never using them. Pt required min A to sit EOB, able to maintain seated balance with UE support. Pt min A to stand and CGA for 20 feet ambulation with RW, v/c for staying close to walker and increased step length. Pt unable to progress further d/t fatigue and pain in neck and back. Patient is not safe to return home alone at this time. Recommending IP rehab at due to decline from baseline function.  -NK  --    Row Name 10/08/21 1421          Therapy Assessment/Plan (PT)    Rehab Potential (PT)  good, to achieve stated therapy goals  -NK     Criteria for Skilled Interventions Met (PT)  yes;skilled treatment is necessary  -NK     Predicted Duration of Therapy Intervention (PT)  Until d/c  -NK     Row Name 10/08/21 1430          Vital Signs    Pre Patient Position  Supine  -NK     Intra Patient Position  Standing  -NK     Post Patient Position  Sitting  -NK     Row Name 10/08/21 1430          Positioning and Restraints    Pre-Treatment Position  in bed  -NK     Post  Treatment Position  chair  -NK     In Chair  encouraged to call for assist;call light within reach;exit alarm on;with other staff;sitting  -NK       User Key  (r) = Recorded By, (t) = Taken By, (c) = Cosigned By    Initials Name Provider Type    Haleigh Linares, PT Student PT Student        Outcome Measures     Row Name 10/08/21 1431          How much help from another person do you currently need...    Turning from your back to your side while in flat bed without using bedrails?  3  -NK     Moving from lying on back to sitting on the side of a flat bed without bedrails?  3  -NK     Moving to and from a bed to a chair (including a wheelchair)?  3  -NK     Standing up from a chair using your arms (e.g., wheelchair, bedside chair)?  3  -NK     Climbing 3-5 steps with a railing?  1  -NK     To walk in hospital room?  2  -NK     AM-PAC 6 Clicks Score (PT)  15  -NK     Row Name 10/08/21 1431          Functional Assessment    Outcome Measure Options  AM-PAC 6 Clicks Basic Mobility (PT)  -NK       User Key  (r) = Recorded By, (t) = Taken By, (c) = Cosigned By    Initials Name Provider Type    Haleigh Linares, PT Student PT Student                       Physical Therapy Education                 Title: PT OT SLP Therapies (Done)     Topic: Physical Therapy (Done)     Point: Mobility training (Done)     Learning Progress Summary           Patient Acceptance, E, VU by NK at 10/8/2021 1432                   Point: Home exercise program (Done)     Learning Progress Summary           Patient Acceptance, E, VU by NK at 10/8/2021 1432                   Point: Body mechanics (Done)     Learning Progress Summary           Patient Acceptance, E, VU by NK at 10/8/2021 1432                   Point: Precautions (Done)     Learning Progress Summary           Patient Acceptance, E, VU by NK at 10/8/2021 1432                               User Key     Initials Effective Dates Name Provider Type [facility]    ADELSO 08/09/21 -  Royce  Haleigh, PT Student PT Student PT              PT Recommendation and Plan  Planned Therapy Interventions (PT): balance training, bed mobility training, gait training, lumbar stabilization, neuromuscular re-education, postural re-education, stair training, strengthening, transfer training  Plan of Care Reviewed With: patient  Progress: no change  Outcome Summary: Pt is a 86 y.o. male presented to [facility] on 10/5/21 with acute mental status changes with lethargy, hypotension, and electrolyte disturbances with evidence of community-acquired pneumonia and acute lung mass. Pt PMH includes back pain, degeneration of lumbar/lumbosacral intervertebral disc, narrowing of spinal canal. Pt lives at home alone in single story home with no steps to enter. Pt reports PLOF independent with all ADLs, has assistance from daughter who lives nearby if necessary. He has a walker and a wheelchair at home but reports never using them. Pt required min A to sit EOB, able to maintain seated balance with UE support. Pt min A to stand and CGA for 20 feet ambulation with RW, v/c for staying close to walker and increased step length. Pt unable to progress further d/t fatigue and pain in neck and back. Patient is not safe to return home alone at this time. Recommending IP rehab at due to decline from baseline function.     Time Calculation:   PT Charges     Row Name 10/08/21 1432             Time Calculation    Start Time  1038  -NK      Stop Time  1113  -NK      Time Calculation (min)  35 min  -NK      PT Received On  10/08/21  -NK      PT - Next Appointment  10/10/21  -NK      PT Goal Re-Cert Due Date  10/22/21  -NK         Time Calculation- PT    Total Timed Code Minutes- PT  0 minute(s)  -NK        User Key  (r) = Recorded By, (t) = Taken By, (c) = Cosigned By    Initials Name Provider Type    Haleigh Linares, PT Student PT Student        Therapy Charges for Today     Code Description Service Date Service Provider Modifiers Qty    [id number]  HC PT EVAL MOD COMPLEXITY 4 10/8/2021 Niles Mcfarland, PT Student GP 1          PT G-Codes  Outcome Measure Options: AM-PAC 6 Clicks Basic Mobility (PT)  AM-PAC 6 Clicks Score (PT): 15    NILES MCFARLAND, PT Student  10/8/2021

## 2021-10-08 NOTE — PLAN OF CARE
Goal Outcome Evaluation:                 85 y/o M admitted for Sepsis. Full code. Pt had RLL biopsied yesterday- tissue pathology pending. Doctors saw what looked to be a fluid filled area in the RLL- once inside the RLL was fibrous tissue and not fluid filled but solid. Pt is A/O but is lethargic. Pt has Hx of lumbar Sx's and epidurals which has made it difficult to move his neck- grimaces w/ all movement of the neck. Liver US was normal. Pt's labs are abnormal- AST/ALT are both very high. May need to do f/u testing to eval the numbers. Pt has been placed on 2 L O2 to keep sats above 90. Pt is hyponatremic, took Samsca yesterday- 1500 cc fluid restriction. Pt is from home. Uses urinal @ bedside. Neuro checks ordered. WBC-15.6 on admit. Replacing Mg this AM. Mg was 1.5 yesterday on dayshift. VSS this shift, Pt gets tachy @ times, but is stable. Pt has Norco 5mg Q4 for pain. C/O headaches this shift. Will cont to monitor Pt throughout the shift.      Problem: Adult Inpatient Plan of Care  Goal: Plan of Care Review  Outcome: Ongoing, Progressing  Goal: Patient-Specific Goal (Individualized)  Outcome: Ongoing, Progressing  Goal: Absence of Hospital-Acquired Illness or Injury  Outcome: Ongoing, Progressing  Intervention: Identify and Manage Fall Risk  Recent Flowsheet Documentation  Taken 10/8/2021 0429 by Max Win, RN  Safety Promotion/Fall Prevention:   safety round/check completed   room organization consistent   nonskid shoes/slippers when out of bed   lighting adjusted   fall prevention program maintained   assistive device/personal items within reach   clutter free environment maintained   activity supervised  Taken 10/8/2021 0201 by Max Win, RN  Safety Promotion/Fall Prevention:   safety round/check completed   room organization consistent   nonskid shoes/slippers when out of bed   fall prevention program maintained   clutter free environment maintained   assistive device/personal items within  reach  Taken 10/8/2021 0030 by Max Win RN  Safety Promotion/Fall Prevention:   safety round/check completed   room organization consistent   nonskid shoes/slippers when out of bed   lighting adjusted   fall prevention program maintained   clutter free environment maintained   assistive device/personal items within reach   activity supervised  Taken 10/7/2021 2200 by Max Win RN  Safety Promotion/Fall Prevention:   safety round/check completed   room organization consistent   nonskid shoes/slippers when out of bed   fall prevention program maintained   clutter free environment maintained   assistive device/personal items within reach  Taken 10/7/2021 2000 by Max Win RN  Safety Promotion/Fall Prevention:   safety round/check completed   room organization consistent   nonskid shoes/slippers when out of bed   lighting adjusted   fall prevention program maintained   clutter free environment maintained   assistive device/personal items within reach   activity supervised  Intervention: Prevent Skin Injury  Recent Flowsheet Documentation  Taken 10/8/2021 0429 by Max Win RN  Skin Protection:   adhesive use limited   incontinence pads utilized   skin-to-device areas padded   tubing/devices free from skin contact  Taken 10/8/2021 0030 by Max Win RN  Skin Protection:   adhesive use limited   incontinence pads utilized   skin-to-device areas padded   tubing/devices free from skin contact  Taken 10/7/2021 2000 by Max Win RN  Skin Protection:   adhesive use limited   incontinence pads utilized   skin-to-device areas padded   tubing/devices free from skin contact   transparent dressing maintained  Taken 10/7/2021 1831 by Max Win RN  Skin Protection:   adhesive use limited   incontinence pads utilized   skin-to-device areas padded   tubing/devices free from skin contact  Intervention: Prevent and Manage VTE (venous thromboembolism) Risk  Recent Flowsheet  Documentation  Taken 10/8/2021 0429 by Max Win RN  VTE Prevention/Management: other (see comments)  Taken 10/8/2021 0030 by Max Win RN  VTE Prevention/Management: other (see comments)  Taken 10/7/2021 2000 by Max Win RN  VTE Prevention/Management: other (see comments)  Taken 10/7/2021 1831 by Max Win RN  VTE Prevention/Management: (Lovenox) other (see comments)  Intervention: Prevent Infection  Recent Flowsheet Documentation  Taken 10/8/2021 0429 by Max Win RN  Infection Prevention:   hand hygiene promoted   personal protective equipment utilized   rest/sleep promoted  Taken 10/8/2021 0201 by Max Win RN  Infection Prevention:   hand hygiene promoted   personal protective equipment utilized   rest/sleep promoted  Taken 10/8/2021 0030 by Max Win RN  Infection Prevention:   hand hygiene promoted   personal protective equipment utilized   rest/sleep promoted  Taken 10/7/2021 2200 by Max Win RN  Infection Prevention:   hand hygiene promoted   personal protective equipment utilized   rest/sleep promoted  Taken 10/7/2021 2000 by Max Win RN  Infection Prevention:   hand hygiene promoted   rest/sleep promoted   personal protective equipment utilized  Goal: Optimal Comfort and Wellbeing  Outcome: Ongoing, Progressing  Intervention: Provide Person-Centered Care  Recent Flowsheet Documentation  Taken 10/8/2021 0429 by Max Win RN  Trust Relationship/Rapport:   reassurance provided   thoughts/feelings acknowledged  Taken 10/8/2021 0030 by Max Win RN  Trust Relationship/Rapport:   reassurance provided   thoughts/feelings acknowledged  Taken 10/7/2021 2000 by Max Win RN  Trust Relationship/Rapport:   care explained   choices provided   emotional support provided   empathic listening provided   questions answered   questions encouraged   reassurance provided   thoughts/feelings acknowledged  Taken 10/7/2021 1831 by  Max Win RN  Trust Relationship/Rapport:   care explained   choices provided   emotional support provided   empathic listening provided   questions answered   questions encouraged   reassurance provided   thoughts/feelings acknowledged  Goal: Readiness for Transition of Care  Outcome: Ongoing, Progressing     Problem: Adjustment to Illness (Sepsis/Septic Shock)  Goal: Optimal Coping  Outcome: Ongoing, Progressing  Intervention: Optimize Psychosocial Adjustment to Illness  Recent Flowsheet Documentation  Taken 10/8/2021 0429 by Max Win RN  Family/Support System Care: support provided  Taken 10/8/2021 0030 by Max Win RN  Supportive Measures: active listening utilized  Family/Support System Care: support provided  Taken 10/7/2021 2000 by Max Win RN  Supportive Measures: active listening utilized  Family/Support System Care: support provided  Taken 10/7/2021 1831 by Max Win RN  Supportive Measures: active listening utilized  Family/Support System Care: support provided     Problem: Bleeding (Sepsis/Septic Shock)  Goal: Absence of Bleeding  Outcome: Ongoing, Progressing     Problem: Glycemic Control Impaired (Sepsis/Septic Shock)  Goal: Blood Glucose Level Within Desired Range  Outcome: Ongoing, Progressing     Problem: Hemodynamic Instability (Sepsis/Septic Shock)  Goal: Effective Tissue Perfusion  Outcome: Ongoing, Progressing  Intervention: Optimize Blood Flow  Recent Flowsheet Documentation  Taken 10/7/2021 2000 by Max Win RN  Fluid/Electrolyte Management: fluids provided  Taken 10/7/2021 1831 by Max Win RN  Fluid/Electrolyte Management: fluids provided     Problem: Infection (Sepsis/Septic Shock)  Goal: Absence of Infection Signs and Symptoms  Outcome: Ongoing, Progressing  Intervention: Prevent or Manage Infection Progression  Recent Flowsheet Documentation  Taken 10/8/2021 0429 by Max Win RN  Infection Prevention:   hand hygiene  promoted   personal protective equipment utilized   rest/sleep promoted  Isolation Precautions: contact spore precautions maintained  Taken 10/8/2021 0201 by Max Win RN  Infection Prevention:   hand hygiene promoted   personal protective equipment utilized   rest/sleep promoted  Taken 10/8/2021 0030 by Max Win RN  Infection Prevention:   hand hygiene promoted   personal protective equipment utilized   rest/sleep promoted  Taken 10/7/2021 2200 by Max Win RN  Infection Prevention:   hand hygiene promoted   personal protective equipment utilized   rest/sleep promoted  Taken 10/7/2021 2000 by Max Win RN  Infection Prevention:   hand hygiene promoted   rest/sleep promoted   personal protective equipment utilized     Problem: Nutrition Impaired (Sepsis/Septic Shock)  Goal: Optimal Nutrition Intake  Outcome: Ongoing, Progressing     Problem: Respiratory Compromise (Sepsis/Septic Shock)  Goal: Effective Oxygenation and Ventilation  Outcome: Ongoing, Progressing  Intervention: Promote Airway Secretion Clearance  Recent Flowsheet Documentation  Taken 10/8/2021 0429 by Max Win RN  Cough And Deep Breathing: done independently per patient  Taken 10/8/2021 0030 by Max Win RN  Cough And Deep Breathing: done independently per patient  Taken 10/7/2021 2000 by Max Win RN  Cough And Deep Breathing: done independently per patient  Taken 10/7/2021 1831 by Max Win RN  Cough And Deep Breathing: done independently per patient  Intervention: Optimize Oxygenation and Ventilation  Recent Flowsheet Documentation  Taken 10/8/2021 0429 by Max Win RN  Airway/Ventilation Management: airway patency maintained  Taken 10/8/2021 0030 by Max Win RN  Airway/Ventilation Management: airway patency maintained  Taken 10/7/2021 2000 by Max Win RN  Airway/Ventilation Management: airway patency maintained  Taken 10/7/2021 1831 by Max Win  RN  Airway/Ventilation Management: airway patency maintained     Problem: Fall Injury Risk  Goal: Absence of Fall and Fall-Related Injury  Outcome: Ongoing, Progressing  Intervention: Identify and Manage Contributors to Fall Injury Risk  Recent Flowsheet Documentation  Taken 10/8/2021 0429 by Max Win RN  Medication Review/Management: medications reviewed  Taken 10/8/2021 0201 by Max Win RN  Medication Review/Management: medications reviewed  Taken 10/8/2021 0030 by Max Win RN  Medication Review/Management: medications reviewed  Taken 10/7/2021 2200 by Max Win RN  Medication Review/Management: medications reviewed  Taken 10/7/2021 2000 by Max Win RN  Medication Review/Management: medications reviewed  Intervention: Promote Injury-Free Environment  Recent Flowsheet Documentation  Taken 10/8/2021 0429 by Max Win RN  Safety Promotion/Fall Prevention:   safety round/check completed   room organization consistent   nonskid shoes/slippers when out of bed   lighting adjusted   fall prevention program maintained   assistive device/personal items within reach   clutter free environment maintained   activity supervised  Taken 10/8/2021 0201 by Max Win RN  Safety Promotion/Fall Prevention:   safety round/check completed   room organization consistent   nonskid shoes/slippers when out of bed   fall prevention program maintained   clutter free environment maintained   assistive device/personal items within reach  Taken 10/8/2021 0030 by Max Win RN  Safety Promotion/Fall Prevention:   safety round/check completed   room organization consistent   nonskid shoes/slippers when out of bed   lighting adjusted   fall prevention program maintained   clutter free environment maintained   assistive device/personal items within reach   activity supervised  Taken 10/7/2021 2200 by Max Win RN  Safety Promotion/Fall Prevention:   safety round/check  completed   room organization consistent   nonskid shoes/slippers when out of bed   fall prevention program maintained   clutter free environment maintained   assistive device/personal items within reach  Taken 10/7/2021 2000 by Max Win RN  Safety Promotion/Fall Prevention:   safety round/check completed   room organization consistent   nonskid shoes/slippers when out of bed   lighting adjusted   fall prevention program maintained   clutter free environment maintained   assistive device/personal items within reach   activity supervised     Problem: Skin Injury Risk Increased  Goal: Skin Health and Integrity  Outcome: Ongoing, Progressing  Intervention: Optimize Skin Protection  Recent Flowsheet Documentation  Taken 10/8/2021 0429 by Max Win RN  Pressure Reduction Techniques:   frequent weight shift encouraged   positioned off wounds   pressure points protected   weight shift assistance provided  Pressure Reduction Devices:   positioning supports utilized   pressure-redistributing mattress utilized  Skin Protection:   adhesive use limited   incontinence pads utilized   skin-to-device areas padded   tubing/devices free from skin contact  Taken 10/8/2021 0030 by Max Win RN  Pressure Reduction Techniques:   frequent weight shift encouraged   positioned off wounds   pressure points protected   weight shift assistance provided  Pressure Reduction Devices:   positioning supports utilized   pressure-redistributing mattress utilized  Skin Protection:   adhesive use limited   incontinence pads utilized   skin-to-device areas padded   tubing/devices free from skin contact  Taken 10/7/2021 2000 by Max Win RN  Skin Protection:   adhesive use limited   incontinence pads utilized   skin-to-device areas padded   tubing/devices free from skin contact   transparent dressing maintained  Taken 10/7/2021 1831 by Max Win RN  Skin Protection:   adhesive use limited   incontinence pads  utilized   skin-to-device areas padded   tubing/devices free from skin contact

## 2021-10-09 NOTE — PLAN OF CARE
Goal Outcome Evaluation:  Plan of Care Reviewed With: patient        Progress: no change  Outcome Summary: Pt still c/o severe neck pain but has been sleeping on and off this shift. Pt has been told he has lung cancer and is anxious. He has been forgetful this shift. Vitals stable.

## 2021-10-09 NOTE — PROGRESS NOTES
LOS: 3 days   Patient Care Team:  Herve Baez MD as PCP - General (Family Medicine)  Uche Mojica MD as Consulting Physician (Nephrology)  Estefania Sanchez MD as Consulting Physician (Hematology and Oncology)    Subjective:  Neck pain but states the pain medication does help    Objective:   Appears Weak      Review of Systems:   Review of Systems   Constitutional: Positive for activity change.   Neurological: Positive for weakness.   Positive for neck pain and headaches        Vital Signs  Temp:  [97.4 °F (36.3 °C)-98.5 °F (36.9 °C)] 97.8 °F (36.6 °C)  Heart Rate:  [] 77  Resp:  [13-26] 13  BP: (107-136)/(55-65) 107/55    Physical Exam:  Physical Exam  Constitutional:       General: He is not in acute distress.  Cardiovascular:      Rate and Rhythm: Rhythm irregular.      Heart sounds: Normal heart sounds.   Pulmonary:      Breath sounds: Normal breath sounds.   Skin:     General: Skin is warm.      Pt in neck brace with ice on    Radiology:  CT Head Without Contrast    Result Date: 10/5/2021  No acute intracranial abnormality. Electronically signed by:  Lenny Mckee  10/5/2021 9:37 PM    CT Chest With Contrast Diagnostic    Result Date: 10/5/2021  1. There is a large low-density mass in the right lung base measuring 9.5 x 5.3 cm. This is favored to represent a neoplastic process which must be considered and excluded. This lesion would be amenable to CT-guided biopsy as determined clinically. Alternatively, a PET/CT study could be obtained for better characterization. Follow-up is recommended. 2. Reticulonodular type infiltrates in the right upper lobe with additional nodular type groundglass and confluent groundglass airspace opacities in the left upper lobe and lingula. Findings are most likely related to infectious bronchopneumonia, possibly related to viral pneumonia and/or aspiration. COVID pneumonia should be considered. 3. Element of interlobular septal thickening may represent  early pulmonary edema. 4. Emphysema. 5. Advanced coronary artery calcifications. 6. Enlarged subcarinal lymph node measuring 1.8 cm in short axis. Metastatic disease must be excluded. 7. Hepatic steatosis. The liver also demonstrates a very subtle micronodular contour which may represent liver disease or early cirrhosis. Correlation with hepatic function is recommended. 8. Diverticulosis. 9. Severe atherosclerotic disease. 10. Additional incidental and chronic findings as above. *[employer] GUIDELINES: Low risk patient: <6mm - Low Risk, no further f/u >6-8mm - low dose CT 6-12 mo, then 2nd f/u CT at 18-24mo >8mm - low dose CT 3,9,24mo OR PET/CT OR Biopsy High Risk Patient: <6mm - optional CT at 12 mo >6-8mm - low dose CT 6-12 mo, then 2nd f/u CT at 18-24mo >8mm - low dose CT at 3 mo, OR PET/CT OR Biopsy MULTIPLE NODULES: Low Risk Patient: <6mm - Low Risk, no further f/u >6-8mm - low dose CT 3-6 mo, then 2nd f/u CT at 18-24mo >8mm - low dose CT 3-6 mo, then 2nd f/u CT at 18-24mo High Risk Patient: <6mm - High risk, multiple nodules, optional CT at 12 mo >6-8mm - low dose CT 3-6 mo, then 2nd f/u CT at 18-24mo >8mm - low dose CT 3-6 mo, then 2nd f/u CT at 18-24mo Slot [id number] Electronically signed by:  Arcenio Vanegas M.D.  10/5/2021 9:30 PM    CT Abdomen Pelvis With Contrast    Result Date: 10/5/2021  1. There is a large low-density mass in the right lung base measuring 9.5 x 5.3 cm. This is favored to represent a neoplastic process which must be considered and excluded. This lesion would be amenable to CT-guided biopsy as determined clinically. Alternatively, a PET/CT study could be obtained for better characterization. Follow-up is recommended. 2. Reticulonodular type infiltrates in the right upper lobe with additional nodular type groundglass and confluent groundglass airspace opacities in the left upper lobe and lingula. Findings are most likely related to infectious bronchopneumonia, possibly related to viral  pneumonia and/or aspiration. COVID pneumonia should be considered. 3. Element of interlobular septal thickening may represent early pulmonary edema. 4. Emphysema. 5. Advanced coronary artery calcifications. 6. Enlarged subcarinal lymph node measuring 1.8 cm in short axis. Metastatic disease must be excluded. 7. Hepatic steatosis. The liver also demonstrates a very subtle micronodular contour which may represent liver disease or early cirrhosis. Correlation with hepatic function is recommended. 8. Diverticulosis. 9. Severe atherosclerotic disease. 10. Additional incidental and chronic findings as above. *[employer] GUIDELINES: Low risk patient: <6mm - Low Risk, no further f/u >6-8mm - low dose CT 6-12 mo, then 2nd f/u CT at 18-24mo >8mm - low dose CT 3,9,24mo OR PET/CT OR Biopsy High Risk Patient: <6mm - optional CT at 12 mo >6-8mm - low dose CT 6-12 mo, then 2nd f/u CT at 18-24mo >8mm - low dose CT at 3 mo, OR PET/CT OR Biopsy MULTIPLE NODULES: Low Risk Patient: <6mm - Low Risk, no further f/u >6-8mm - low dose CT 3-6 mo, then 2nd f/u CT at 18-24mo >8mm - low dose CT 3-6 mo, then 2nd f/u CT at 18-24mo High Risk Patient: <6mm - High risk, multiple nodules, optional CT at 12 mo >6-8mm - low dose CT 3-6 mo, then 2nd f/u CT at 18-24mo >8mm - low dose CT 3-6 mo, then 2nd f/u CT at 18-24mo Slot [id number] Electronically signed by:  Arcenio Vanegas M.D.  10/5/2021 9:30 PM    US Liver    Result Date: 10/7/2021  Normal hepatic ultrasound.  Electronically Signed By-Opal Perales MD On:10/7/2021 9:50 AM This report was finalized on [id number] by  Opal Perales MD.    XR Chest 1 View    Result Date: 10/7/2021  No visible pneumothorax status post right lower lobe lung mass biopsy earlier today.  Electronically Signed By-Opal Perales MD On:10/7/2021 10:17 AM This report was finalized on [id number] by  Opal Perales MD.    XR Chest 1 View    Result Date: 10/5/2021  1. There is an abnormality in the right lower chest.  Part of this has a masslike configuration. It may be due to parenchymal parenchymal disease, pleural disease, or both. A lung mass is possible. Further evaluation with chest CT with contrast is recommended. 2. Question some minor opacity in the lateral left midlung which may be due to pneumonia.  Electronically Signed By-Margarita Copoer MD On:10/5/2021 9:06 PM This report was finalized on [id number] by  Margarita Cooper MD.    CT Needle Biopsy Lung    Result Date: 10/7/2021  Technically successful biopsy of the pleural-based mass at the right lung base. Initial preliminary interpretation suggest dense fibrous tissue. Final path results are pending. Postprocedure chest radiograph is pending.  Electronically Signed By-Aly Brandon MD On:10/7/2021 10:07 AM This report was finalized on [id number] by  Aly Brandon MD.         Results Review:     I reviewed the patient's new clinical results.  I reviewed the patient's new imaging results and agree with the interpretation.    Medication Review:   Scheduled Meds:aspirin, 81 mg, Oral, Daily  atorvastatin, 20 mg, Oral, Nightly  budesonide, 0.5 mg, Nebulization, BID - RT  cefTRIAXone, 1 g, Intravenous, Q24H  dilTIAZem CD, 180 mg, Oral, Q24H  enoxaparin, 40 mg, Subcutaneous, Daily  ferric gluconate, 125 mg, Intravenous, Daily  ipratropium-albuterol, 3 mL, Nebulization, 4x Daily - RT      Continuous Infusions:   PRN Meds:.HYDROcodone-acetaminophen  •  magnesium sulfate **OR** magnesium sulfate **OR** magnesium sulfate  •  potassium chloride **OR** potassium chloride **OR** potassium chloride  •  [COMPLETED] Insert peripheral IV **AND** sodium chloride  •  tiZANidine    Labs:    CBC    Results from last 7 days   Lab Units 10/08/21  0533 10/07/21  0546 10/06/21  0906 10/05/21  1951   WBC 10*3/mm3 13.00* 15.60* 19.60* 14.20*   HEMOGLOBIN g/dL 11.1* 11.7* 12.6* 11.7*   PLATELETS 10*3/mm3 300 317 316 392     BMP   Results from last 7 days   Lab Units 10/09/21  0531 10/09/21  0117  10/08/21  1205 10/08/21  0533 10/08/21  0042 10/07/21  1902 10/07/21  1252 10/07/21  0547 10/07/21  0547 10/06/21  0906 10/06/21  0906 10/05/21  1951 10/05/21  1951   SODIUM mmol/L  --  130* 127* 127* 127* 129* 125*  --  124*   < > 123*   < > 120*   POTASSIUM mmol/L  --  3.5 3.6 3.5 3.6 3.7 3.6  --  3.7   < > 3.1*   < > 2.5*   CHLORIDE mmol/L  --  92* 87* 89* 89* 91* 85*  --  87*   < > 82*   < > 75*   CO2 mmol/L  --  30.0* 26.0 25.0 26.0 24.0 26.0  --  24.0   < > 25.0   < > 21.0*   BUN mg/dL  --  18 21 20 22 22 22  --  22   < > 14   < > 16   CREATININE mg/dL 0.59* 0.62* 0.66* 0.62* 0.65* 0.66* 0.65*   < > 0.65*   < > 0.67*   < > 0.96   GLUCOSE mg/dL  --  111* 153* 103* 128* 145* 103*  --  90   < > 101*   < > 178*   MAGNESIUM mg/dL 2.2  --   --   --   --   --   --   --   --   --  1.5*  --  1.5*    < > = values in this interval not displayed.     Cr Clearance Estimated Creatinine Clearance: 74.9 mL/min (A) (by C-G formula based on SCr of 0.59 mg/dL (L)).  Coag   Results from last 7 days   Lab Units 10/05/21  1951   INR  1.18*   APTT seconds 30.8     HbA1C No results found for: HGBA1C  Blood Glucose No results found for: POCGLU  Infection   Results from last 7 days   Lab Units 10/05/21  2001 10/05/21  1957 10/05/21  1951   BLOODCX  No growth at 3 days  --  No growth at 3 days   URINECX   --  No growth  --      CMP   Results from last 7 days   Lab Units 10/09/21  0531 10/09/21  0117 10/08/21  1205 10/08/21  0533 10/08/21  0042 10/07/21  1902 10/07/21  1252 10/07/21  0547 10/07/21  0547 10/06/21  0906 10/06/21  0906 10/05/21  1951 10/05/21  1951   SODIUM mmol/L  --  130* 127* 127* 127* 129* 125*  --  124*   < > 123*   < > 120*   POTASSIUM mmol/L  --  3.5 3.6 3.5 3.6 3.7 3.6  --  3.7   < > 3.1*   < > 2.5*   CHLORIDE mmol/L  --  92* 87* 89* 89* 91* 85*  --  87*   < > 82*   < > 75*   CO2 mmol/L  --  30.0* 26.0 25.0 26.0 24.0 26.0  --  24.0   < > 25.0   < > 21.0*   BUN mg/dL  --  18 21 20 22 22 22  --  22   < > 14   < > 16    CREATININE mg/dL 0.59* 0.62* 0.66* 0.62* 0.65* 0.66* 0.65*   < > 0.65*   < > 0.67*   < > 0.96   GLUCOSE mg/dL  --  111* 153* 103* 128* 145* 103*  --  90   < > 101*   < > 178*   ALBUMIN g/dL  --   --   --  2.50*  --   --   --   --  2.60*  --  3.00*  --  3.30*   BILIRUBIN mg/dL  --   --   --  0.9  --   --   --   --  0.9  --  0.8  --  0.6   ALK PHOS U/L  --   --   --  74  --   --   --   --  77  --  88  --  83   AST (SGOT) U/L  --   --   --  965*  --   --   --   --  814*  --  260*  --  155*   ALT (SGPT) U/L  --   --   --  881*  --   --   --   --  592*  --  177*  --  92*   LIPASE U/L  --   --   --   --   --   --   --   --   --   --   --   --  47    < > = values in this interval not displayed.     UA    Results from last 7 days   Lab Units 10/05/21  1957   NITRITE UA  Negative   WBC UA /HPF 31-50*   BACTERIA UA /HPF None Seen   SQUAM EPITHEL UA /HPF 7-12*   URINECX  No growth     Radiology(recent) XR Chest 1 View    Result Date: 10/7/2021  No visible pneumothorax status post right lower lobe lung mass biopsy earlier today.  Electronically Signed By-Opal Perales MD On:10/7/2021 10:17 AM This report was finalized on [id number] by  Opal Perales MD.    CT Needle Biopsy Lung    Result Date: 10/7/2021  Technically successful biopsy of the pleural-based mass at the right lung base. Initial preliminary interpretation suggest dense fibrous tissue. Final path results are pending. Postprocedure chest radiograph is pending.  Electronically Signed By-Aly Brandon MD On:10/7/2021 10:07 AM This report was finalized on [id number] by  Aly Brandon MD.     Assessment:    Acute mental status changes secondary to acute metabolic encephalopathy secondary to community-acquired pneumonia  Acute sepsis secondary to the above  Acute right lower lobe lung mass  Status post ultrasound-guided biopsy of right lower lobe mass 10/7/2021 which revealed invasive poorly diff squamous cell carcinoma  Panlobular COPD with emphysema  BPH with  LUTS  Chronic mucopurulent bronchitis  Hepatic steatosis  Hepatic transaminase derangement  Atherosclerotic heart disease of native coronary arteries with angina pectoris  Coronary calcifications  Chronic kidney disease stage II  Microscopic hematuria  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

## 2021-10-09 NOTE — PROGRESS NOTES
Daily Progress Note        Sepsis (HCC)      Assessment    Right lower lobe mass, S post FNA 10/7/2021 + for Invasive poorly differentiated squamous cell carcinoma     Generalized weakness and lethargy and fatigue  Hyponatremia possible related to lung mass  COPD  Possible pneumonia with leukocytosis   hypokalemia  UTI     Hepatic steatosis  Hepatic transaminase derangement  -Elevated liver enzymes     Recommendations:    CT cervical spine, excruciating neck pain    Hepatitis panel, pending   Oncology following      Antibiotic Rocephin  Bronchodilator\inhaled corticosteroid  DVT prophylaxis Lovenox    Will need total body PET scan as outpatient              LOS: 3 days     Subjective         Objective     Vital signs for last 24 hours:  Vitals:    10/09/21 0200 10/09/21 0557 10/09/21 0710 10/09/21 0719   BP: 127/64 107/55     BP Location: Left arm Left arm     Patient Position: Lying Lying     Pulse: 91 92 80 77   Resp: 14 18 15 13   Temp: 98 °F (36.7 °C) 97.8 °F (36.6 °C)     TempSrc: Oral Oral     SpO2: 98% 98% 92% 93%   Weight:  86.5 kg (190 lb 11.2 oz)     Height:           Intake/Output last 3 shifts:  I/O last 3 completed shifts:  In: 530 [P.O.:480; I.V.:50]  Out: 2100 [Urine:2100]  Intake/Output this shift:  No intake/output data recorded.      Radiology  Imaging Results (Last 24 Hours)     ** No results found for the last 24 hours. **          Labs:  Results from last 7 days   Lab Units 10/08/21  0533   WBC 10*3/mm3 13.00*   HEMOGLOBIN g/dL 11.1*   HEMATOCRIT % 33.1*   PLATELETS 10*3/mm3 300     Results from last 7 days   Lab Units 10/09/21  0531 10/09/21  0117 10/09/21  0117 10/08/21  1205 10/08/21  0533   SODIUM mmol/L  --   --  130*   < > 127*   POTASSIUM mmol/L  --   --  3.5   < > 3.5   CHLORIDE mmol/L  --   --  92*   < > 89*   CO2 mmol/L  --   --  30.0*   < > 25.0   BUN mg/dL  --   --  18   < > 20   CREATININE mg/dL 0.59*   < > 0.62*   < > 0.62*   CALCIUM mg/dL  --   --  8.5*   < > 8.6   BILIRUBIN mg/dL   --   --   --   --  0.9   ALK PHOS U/L  --   --   --   --  74   ALT (SGPT) U/L  --   --   --   --  881*   AST (SGOT) U/L  --   --   --   --  965*   GLUCOSE mg/dL  --   --  111*   < > 103*    < > = values in this interval not displayed.         Results from last 7 days   Lab Units 10/08/21  0533 10/07/21  0547 10/06/21  0906   ALBUMIN g/dL 2.50* 2.60* 3.00*     Results from last 7 days   Lab Units 10/05/21  1951   CK TOTAL U/L 97   TROPONIN T ng/mL <0.010         Results from last 7 days   Lab Units 10/09/21  0531   MAGNESIUM mg/dL 2.2     Results from last 7 days   Lab Units 10/05/21  1951   INR  1.18*   APTT seconds 30.8     Results from last 7 days   Lab Units 10/05/21  1951   TSH uIU/mL 1.420           Meds:   SCHEDULE  aspirin, 81 mg, Oral, Daily  atorvastatin, 20 mg, Oral, Nightly  budesonide, 0.5 mg, Nebulization, BID - RT  cefTRIAXone, 1 g, Intravenous, Q24H  dilTIAZem CD, 180 mg, Oral, Q24H  enoxaparin, 40 mg, Subcutaneous, Daily  ferric gluconate, 125 mg, Intravenous, Daily  ipratropium-albuterol, 3 mL, Nebulization, 4x Daily - RT      Infusions     PRNs  HYDROcodone-acetaminophen  •  magnesium sulfate **OR** magnesium sulfate **OR** magnesium sulfate  •  potassium chloride **OR** potassium chloride **OR** potassium chloride  •  [COMPLETED] Insert peripheral IV **AND** sodium chloride  •  tiZANidine    Physical Exam:  Physical Exam  Vitals reviewed.   Pulmonary:      Breath sounds: Rhonchi present.   Skin:     General: Skin is warm and dry.   Neurological:      Mental Status: He is alert.         ROS  Review of Systems   HENT:        Neck pain   Respiratory: Positive for cough and shortness of breath.    Musculoskeletal: Positive for arthralgias.       Reviewed the patient's new clinical results    Electronically signed by DAVID Quigley

## 2021-10-09 NOTE — PLAN OF CARE
Goal Outcome Evaluation:                 85 y/o M admitted for sepsis. Full code. Pt is experiencing sev neck pain. Pt has Hx of back/neck problems. Pt has tried epidural shots as recent as a few months ago. Doctors saw mass in RLL and biopsied mass. Mass was thought to be fluid filled-during biopsy doctors found out the mass was solid- tissue pathology pending this AM. High fall risk, bed alarm on. Up w/ 1-2 assist. Tx of Norco 5 mg PO Q4hr PRN and hot/cold packs to Tx the neck pain this shift. VSS, will cont to monitor Pt throughout the shift.      Problem: Adult Inpatient Plan of Care  Goal: Plan of Care Review  Outcome: Ongoing, Progressing  Goal: Patient-Specific Goal (Individualized)  Outcome: Ongoing, Progressing  Goal: Absence of Hospital-Acquired Illness or Injury  Outcome: Ongoing, Progressing  Intervention: Identify and Manage Fall Risk  Recent Flowsheet Documentation  Taken 10/9/2021 0425 by Max Win RN  Safety Promotion/Fall Prevention:   safety round/check completed   room organization consistent   nonskid shoes/slippers when out of bed   lighting adjusted   fall prevention program maintained   clutter free environment maintained   assistive device/personal items within reach   activity supervised  Taken 10/9/2021 0200 by Max Win RN  Safety Promotion/Fall Prevention:   safety round/check completed   room organization consistent   nonskid shoes/slippers when out of bed   fall prevention program maintained   clutter free environment maintained   assistive device/personal items within reach  Taken 10/9/2021 0040 by Max Win, RN  Safety Promotion/Fall Prevention:   safety round/check completed   room organization consistent   nonskid shoes/slippers when out of bed   fall prevention program maintained   clutter free environment maintained   assistive device/personal items within reach  Taken 10/8/2021 2324 by Max Win, RN  Safety Promotion/Fall Prevention:   safety  round/check completed   room organization consistent   nonskid shoes/slippers when out of bed   lighting adjusted   fall prevention program maintained   clutter free environment maintained   assistive device/personal items within reach   activity supervised  Taken 10/8/2021 2200 by Max Win RN  Safety Promotion/Fall Prevention:   safety round/check completed   room organization consistent   nonskid shoes/slippers when out of bed   fall prevention program maintained   clutter free environment maintained   assistive device/personal items within reach  Taken 10/8/2021 2015 by Max Win RN  Safety Promotion/Fall Prevention:   safety round/check completed   room organization consistent   nonskid shoes/slippers when out of bed   lighting adjusted   fall prevention program maintained   clutter free environment maintained   activity supervised   assistive device/personal items within reach  Intervention: Prevent Skin Injury  Recent Flowsheet Documentation  Taken 10/9/2021 0425 by Max Win RN  Skin Protection:   adhesive use limited   incontinence pads utilized   skin-to-device areas padded   tubing/devices free from skin contact  Taken 10/8/2021 2324 by Max Win RN  Skin Protection:   adhesive use limited   incontinence pads utilized   skin-to-device areas padded   tubing/devices free from skin contact  Taken 10/8/2021 2015 by Max Win RN  Skin Protection:   adhesive use limited   incontinence pads utilized   skin-to-device areas padded   tubing/devices free from skin contact   transparent dressing maintained  Intervention: Prevent and Manage VTE (venous thromboembolism) Risk  Recent Flowsheet Documentation  Taken 10/9/2021 0425 by Max Win RN  VTE Prevention/Management: other (see comments)  Taken 10/8/2021 2324 by Max Win RN  VTE Prevention/Management: other (see comments)  Taken 10/8/2021 2015 by Max Win RN  VTE Prevention/Management: (Lovenox)  other (see comments)  Intervention: Prevent Infection  Recent Flowsheet Documentation  Taken 10/9/2021 0425 by Max Win RN  Infection Prevention:   hand hygiene promoted   personal protective equipment utilized   rest/sleep promoted  Taken 10/9/2021 0200 by Max Win RN  Infection Prevention:   hand hygiene promoted   personal protective equipment utilized   rest/sleep promoted  Taken 10/9/2021 0040 by Max Win RN  Infection Prevention:   hand hygiene promoted   personal protective equipment utilized   rest/sleep promoted  Taken 10/8/2021 2324 by Max Win RN  Infection Prevention:   hand hygiene promoted   personal protective equipment utilized   rest/sleep promoted  Taken 10/8/2021 2200 by Max Win RN  Infection Prevention:   hand hygiene promoted   personal protective equipment utilized   rest/sleep promoted  Taken 10/8/2021 2015 by Max Win RN  Infection Prevention:   hand hygiene promoted   personal protective equipment utilized   rest/sleep promoted  Goal: Optimal Comfort and Wellbeing  Outcome: Ongoing, Progressing  Intervention: Provide Person-Centered Care  Recent Flowsheet Documentation  Taken 10/9/2021 0425 by Max Win RN  Trust Relationship/Rapport:   reassurance provided   thoughts/feelings acknowledged  Taken 10/8/2021 2324 by Max Win RN  Trust Relationship/Rapport:   reassurance provided   thoughts/feelings acknowledged  Taken 10/8/2021 2015 by Max Win RN  Trust Relationship/Rapport:   care explained   choices provided   emotional support provided   empathic listening provided   questions answered   questions encouraged   reassurance provided   thoughts/feelings acknowledged  Goal: Readiness for Transition of Care  Outcome: Ongoing, Progressing     Problem: Adjustment to Illness (Sepsis/Septic Shock)  Goal: Optimal Coping  Outcome: Ongoing, Progressing  Intervention: Optimize Psychosocial Adjustment to Illness  Recent  Flowsheet Documentation  Taken 10/9/2021 0425 by Max Win RN  Family/Support System Care: support provided  Taken 10/8/2021 2324 by Max Win RN  Family/Support System Care: support provided  Taken 10/8/2021 2015 by Max Win RN  Supportive Measures: active listening utilized  Family/Support System Care: support provided     Problem: Bleeding (Sepsis/Septic Shock)  Goal: Absence of Bleeding  Outcome: Ongoing, Progressing     Problem: Glycemic Control Impaired (Sepsis/Septic Shock)  Goal: Blood Glucose Level Within Desired Range  Outcome: Ongoing, Progressing     Problem: Hemodynamic Instability (Sepsis/Septic Shock)  Goal: Effective Tissue Perfusion  Outcome: Ongoing, Progressing  Intervention: Optimize Blood Flow  Recent Flowsheet Documentation  Taken 10/8/2021 2324 by Max Win RN  Fluid/Electrolyte Management: fluids restricted  Taken 10/8/2021 2015 by Max Win RN  Fluid/Electrolyte Management: (1500 cc) fluids restricted     Problem: Infection (Sepsis/Septic Shock)  Goal: Absence of Infection Signs and Symptoms  Outcome: Ongoing, Progressing  Intervention: Prevent or Manage Infection Progression  Recent Flowsheet Documentation  Taken 10/9/2021 0425 by Max Win RN  Infection Prevention:   hand hygiene promoted   personal protective equipment utilized   rest/sleep promoted  Taken 10/9/2021 0200 by Max Win RN  Infection Prevention:   hand hygiene promoted   personal protective equipment utilized   rest/sleep promoted  Taken 10/9/2021 0040 by Max Win RN  Infection Prevention:   hand hygiene promoted   personal protective equipment utilized   rest/sleep promoted  Taken 10/8/2021 2324 by Max Win RN  Infection Prevention:   hand hygiene promoted   personal protective equipment utilized   rest/sleep promoted  Taken 10/8/2021 2200 by Max Win RN  Infection Prevention:   hand hygiene promoted   personal protective equipment  utilized   rest/sleep promoted  Taken 10/8/2021 2015 by Max Win RN  Infection Prevention:   hand hygiene promoted   personal protective equipment utilized   rest/sleep promoted     Problem: Nutrition Impaired (Sepsis/Septic Shock)  Goal: Optimal Nutrition Intake  Outcome: Ongoing, Progressing     Problem: Respiratory Compromise (Sepsis/Septic Shock)  Goal: Effective Oxygenation and Ventilation  Outcome: Ongoing, Progressing  Intervention: Promote Airway Secretion Clearance  Recent Flowsheet Documentation  Taken 10/9/2021 0425 by Max Win RN  Cough And Deep Breathing: done independently per patient  Taken 10/8/2021 2324 by Max Win RN  Cough And Deep Breathing: done independently per patient  Taken 10/8/2021 2015 by Max Win RN  Cough And Deep Breathing: done independently per patient  Intervention: Optimize Oxygenation and Ventilation  Recent Flowsheet Documentation  Taken 10/9/2021 0425 by Max Win RN  Airway/Ventilation Management: airway patency maintained  Taken 10/8/2021 2324 by Max Win RN  Airway/Ventilation Management: airway patency maintained  Taken 10/8/2021 2015 by Max Win RN  Airway/Ventilation Management: airway patency maintained     Problem: Fall Injury Risk  Goal: Absence of Fall and Fall-Related Injury  Outcome: Ongoing, Progressing  Intervention: Identify and Manage Contributors to Fall Injury Risk  Recent Flowsheet Documentation  Taken 10/9/2021 0425 by Max Win RN  Medication Review/Management: medications reviewed  Taken 10/9/2021 0040 by Max Win RN  Medication Review/Management: medications reviewed  Taken 10/8/2021 2324 by Max Win RN  Medication Review/Management: medications reviewed  Taken 10/8/2021 2200 by Max Win RN  Medication Review/Management: medications reviewed  Taken 10/8/2021 2015 by Max Win RN  Medication Review/Management: medications reviewed  Intervention: Promote  Injury-Free Environment  Recent Flowsheet Documentation  Taken 10/9/2021 0425 by Max Win RN  Safety Promotion/Fall Prevention:   safety round/check completed   room organization consistent   nonskid shoes/slippers when out of bed   lighting adjusted   fall prevention program maintained   clutter free environment maintained   assistive device/personal items within reach   activity supervised  Taken 10/9/2021 0200 by Max Win, RN  Safety Promotion/Fall Prevention:   safety round/check completed   room organization consistent   nonskid shoes/slippers when out of bed   fall prevention program maintained   clutter free environment maintained   assistive device/personal items within reach  Taken 10/9/2021 0040 by Max Win RN  Safety Promotion/Fall Prevention:   safety round/check completed   room organization consistent   nonskid shoes/slippers when out of bed   fall prevention program maintained   clutter free environment maintained   assistive device/personal items within reach  Taken 10/8/2021 2324 by Max Win RN  Safety Promotion/Fall Prevention:   safety round/check completed   room organization consistent   nonskid shoes/slippers when out of bed   lighting adjusted   fall prevention program maintained   clutter free environment maintained   assistive device/personal items within reach   activity supervised  Taken 10/8/2021 2200 by Max Win, RN  Safety Promotion/Fall Prevention:   safety round/check completed   room organization consistent   nonskid shoes/slippers when out of bed   fall prevention program maintained   clutter free environment maintained   assistive device/personal items within reach  Taken 10/8/2021 2015 by Max Win RN  Safety Promotion/Fall Prevention:   safety round/check completed   room organization consistent   nonskid shoes/slippers when out of bed   lighting adjusted   fall prevention program maintained   clutter free environment  maintained   activity supervised   assistive device/personal items within reach     Problem: Skin Injury Risk Increased  Goal: Skin Health and Integrity  Outcome: Ongoing, Progressing  Intervention: Optimize Skin Protection  Recent Flowsheet Documentation  Taken 10/9/2021 0425 by Max Win RN  Pressure Reduction Techniques:   frequent weight shift encouraged   positioned off wounds   pressure points protected   weight shift assistance provided  Pressure Reduction Devices:   positioning supports utilized   pressure-redistributing mattress utilized  Skin Protection:   adhesive use limited   incontinence pads utilized   skin-to-device areas padded   tubing/devices free from skin contact  Taken 10/8/2021 2324 by Max Win RN  Pressure Reduction Techniques:   frequent weight shift encouraged   positioned off wounds   pressure points protected   weight shift assistance provided  Pressure Reduction Devices:   positioning supports utilized   pressure-redistributing mattress utilized  Skin Protection:   adhesive use limited   incontinence pads utilized   skin-to-device areas padded   tubing/devices free from skin contact  Taken 10/8/2021 2015 by Max Win RN  Pressure Reduction Techniques:   frequent weight shift encouraged   positioned off wounds   pressure points protected   weight shift assistance provided  Pressure Reduction Devices:   positioning supports utilized   pressure-redistributing mattress utilized  Skin Protection:   adhesive use limited   incontinence pads utilized   skin-to-device areas padded   tubing/devices free from skin contact   transparent dressing maintained

## 2021-10-09 NOTE — PROGRESS NOTES
"NEPHROLOGY PROGRESS NOTE------[facility]    [facility]  [phone]  Ivis Barnes MD      Patient Care Team:  Herve Baez MD as PCP - General (Family Medicine)  Uche Mojica MD as Consulting Physician (Nephrology)  Estefania Sanchez MD as Consulting Physician (Hematology and Oncology)      Provider:  Ivis Barnes MD  Patient Name: Prabhjot Roldan  :  1935    SUBJECTIVE:    F/U HYPONATREMIA    Comfortable. Some neck pain. No SOB, CP, dysuria, palpitations. No HA, blurry vision, dizziness    Medication:  aspirin, 81 mg, Oral, Daily  atorvastatin, 20 mg, Oral, Nightly  budesonide, 0.5 mg, Nebulization, BID - RT  cefTRIAXone, 1 g, Intravenous, Q24H  dilTIAZem CD, 180 mg, Oral, Q24H  enoxaparin, 40 mg, Subcutaneous, Daily  ipratropium-albuterol, 3 mL, Nebulization, 4x Daily - RT           OBJECTIVE    Vital Sign Min/Max for last 24 hours  Temp  Min: 97.4 °F (36.3 °C)  Max: 98.5 °F (36.9 °C)   BP  Min: 107/55  Max: 136/64   Pulse  Min: 80  Max: 100   Resp  Min: 14  Max: 26   SpO2  Min: 91 %  Max: 98 %   No data recorded   Weight  Min: 86.5 kg (190 lb 11.2 oz)  Max: 86.5 kg (190 lb 11.2 oz)     Flowsheet Rows      First Filed Value   Admission Height  175.3 cm (69\") Documented at 10/05/2021 1928   Admission Weight  86.2 kg (190 lb) Documented at 10/05/2021 1928          No intake/output data recorded.  I/O last 3 completed shifts:  In: 530 [P.O.:480; I.V.:50]  Out: 2100 [Urine:2100]    Physical Exam:  General Appearance: alert, appears stated age and cooperative  Head: normocephalic, without obvious abnormality and atraumatic  Eyes: conjunctivae and sclerae normal and no icterus  Neck: supple and no JVD  Lungs: +SCATTERED RHONCHI  Heart: regular rhythm & normal rate and normal S1, S2 +ROGERIO  Chest: Wall no abnormalities observed  Abdomen: normal bowel sounds and soft non-tender +OBESITY  Extremities: moves extremities well, no " edema, no cyanosis and no redness +DJD  Skin: no bleeding, bruising or rash, turgor normal, color normal and no lesions noted  Neurologic: Alert, and oriented. No focal deficits    Labs:    WBC WBC   Date Value Ref Range Status   10/08/2021 13.00 (H) 3.40 - 10.80 10*3/mm3 Final   10/07/2021 15.60 (H) 3.40 - 10.80 10*3/mm3 Final   10/06/2021 19.60 (H) 3.40 - 10.80 10*3/mm3 Final      HGB Hemoglobin   Date Value Ref Range Status   10/08/2021 11.1 (L) 13.0 - 17.7 g/dL Final   10/07/2021 11.7 (L) 13.0 - 17.7 g/dL Final   10/06/2021 12.6 (L) 13.0 - 17.7 g/dL Final      HCT Hematocrit   Date Value Ref Range Status   10/08/2021 33.1 (L) 37.5 - 51.0 % Final   10/07/2021 34.3 (L) 37.5 - 51.0 % Final   10/06/2021 36.9 (L) 37.5 - 51.0 % Final      Platlets No results found for: LABPLAT   MCV MCV   Date Value Ref Range Status   10/08/2021 87.9 79.0 - 97.0 fL Final   10/07/2021 86.8 79.0 - 97.0 fL Final   10/06/2021 86.4 79.0 - 97.0 fL Final          Sodium Sodium   Date Value Ref Range Status   10/09/2021 130 (L) 136 - 145 mmol/L Final   10/08/2021 127 (L) 136 - 145 mmol/L Final   10/08/2021 127 (L) 136 - 145 mmol/L Final   10/08/2021 127 (L) 136 - 145 mmol/L Final   10/07/2021 129 (L) 136 - 145 mmol/L Final   10/07/2021 125 (L) 136 - 145 mmol/L Final   10/07/2021 124 (L) 136 - 145 mmol/L Final   10/06/2021 123 (L) 136 - 145 mmol/L Final      Potassium Potassium   Date Value Ref Range Status   10/09/2021 3.5 3.5 - 5.2 mmol/L Final   10/08/2021 3.6 3.5 - 5.2 mmol/L Final     Comment:     Slight hemolysis detected by analyzer. Results may be affected.   10/08/2021 3.5 3.5 - 5.2 mmol/L Final     Comment:     Slight hemolysis detected by analyzer. Results may be affected.   10/08/2021 3.6 3.5 - 5.2 mmol/L Final   10/07/2021 3.7 3.5 - 5.2 mmol/L Final     Comment:     Slight hemolysis detected by analyzer. Results may be affected.   10/07/2021 3.6 3.5 - 5.2 mmol/L Final     Comment:     Slight hemolysis detected by analyzer. Results  may be affected.   10/07/2021 3.7 3.5 - 5.2 mmol/L Final     Comment:     Slight hemolysis detected by analyzer. Results may be affected.   10/06/2021 3.1 (L) 3.5 - 5.2 mmol/L Final      Chloride Chloride   Date Value Ref Range Status   10/09/2021 92 (L) 98 - 107 mmol/L Final   10/08/2021 87 (L) 98 - 107 mmol/L Final   10/08/2021 89 (L) 98 - 107 mmol/L Final   10/08/2021 89 (L) 98 - 107 mmol/L Final   10/07/2021 91 (L) 98 - 107 mmol/L Final   10/07/2021 85 (L) 98 - 107 mmol/L Final   10/07/2021 87 (L) 98 - 107 mmol/L Final   10/06/2021 82 (L) 98 - 107 mmol/L Final      CO2 CO2   Date Value Ref Range Status   10/09/2021 30.0 (H) 22.0 - 29.0 mmol/L Final   10/08/2021 26.0 22.0 - 29.0 mmol/L Final   10/08/2021 25.0 22.0 - 29.0 mmol/L Final   10/08/2021 26.0 22.0 - 29.0 mmol/L Final   10/07/2021 24.0 22.0 - 29.0 mmol/L Final   10/07/2021 26.0 22.0 - 29.0 mmol/L Final   10/07/2021 24.0 22.0 - 29.0 mmol/L Final   10/06/2021 25.0 22.0 - 29.0 mmol/L Final      BUN BUN   Date Value Ref Range Status   10/09/2021 18 8 - 23 mg/dL Final   10/08/2021 21 8 - 23 mg/dL Final   10/08/2021 20 8 - 23 mg/dL Final   10/08/2021 22 8 - 23 mg/dL Final   10/07/2021 22 8 - 23 mg/dL Final   10/07/2021 22 8 - 23 mg/dL Final   10/07/2021 22 8 - 23 mg/dL Final   10/06/2021 14 8 - 23 mg/dL Final      Creatinine Creatinine   Date Value Ref Range Status   10/09/2021 0.59 (L) 0.76 - 1.27 mg/dL Final   10/09/2021 0.62 (L) 0.76 - 1.27 mg/dL Final   10/08/2021 0.66 (L) 0.76 - 1.27 mg/dL Final   10/08/2021 0.62 (L) 0.76 - 1.27 mg/dL Final   10/08/2021 0.65 (L) 0.76 - 1.27 mg/dL Final   10/07/2021 0.66 (L) 0.76 - 1.27 mg/dL Final   10/07/2021 0.65 (L) 0.76 - 1.27 mg/dL Final   10/07/2021 0.65 (L) 0.76 - 1.27 mg/dL Final   10/06/2021 0.67 (L) 0.76 - 1.27 mg/dL Final      Calcium Calcium   Date Value Ref Range Status   10/09/2021 8.5 (L) 8.6 - 10.5 mg/dL Final   10/08/2021 8.8 8.6 - 10.5 mg/dL Final   10/08/2021 8.6 8.6 - 10.5 mg/dL Final   10/08/2021 8.3  (L) 8.6 - 10.5 mg/dL Final   10/07/2021 8.3 (L) 8.6 - 10.5 mg/dL Final   10/07/2021 8.2 (L) 8.6 - 10.5 mg/dL Final   10/07/2021 8.4 (L) 8.6 - 10.5 mg/dL Final   10/06/2021 8.6 8.6 - 10.5 mg/dL Final      PO4 No components found for: PO4   Albumin Albumin   Date Value Ref Range Status   10/08/2021 2.50 (L) 3.50 - 5.20 g/dL Final   10/07/2021 2.60 (L) 3.50 - 5.20 g/dL Final   10/06/2021 3.00 (L) 3.50 - 5.20 g/dL Final      Magnesium Magnesium   Date Value Ref Range Status   10/09/2021 2.2 1.6 - 2.4 mg/dL Final   10/06/2021 1.5 (L) 1.6 - 2.4 mg/dL Final      Uric Acid No components found for: URIC ACID     Imaging Results (Last 72 Hours)     Procedure Component Value Units Date/Time    CT Needle Biopsy Lung [[id number]] Collected: 10/07/21 1005     Updated: 10/07/21 1127    Narrative:      DATE OF EXAM:  10/7/2021 9:27 AM     PROCEDURE:  CT NEEDLE BIOPSY LUNG-     INDICATIONS:  Right lower lobe lung mass; A41.9-Sepsis, unspecified organism;  L22-Diaper dermatitis; J18.9-Pneumonia, unspecified organism;  E87.3-Uqjz-wqvfqoddcm and hyponatremia; E87.6-Hypokalemia;  R53.1-Weakness; R91.8-Other nonspecific abnormal finding of lung field     COMPARISON:  No Comparisons Available     FLUOROSCOPIC TIME:  CT fluoroscopy time 2.61 seconds     PHYSICIAN MONITORED CONSCIOUS SEDATION TIME:  15 minutes     CONTRAST MEDIA:  None     TECHNIQUE:   The patient's medications were reviewed prior to the procedure. The  procedure, risks and alternatives were discussed and informed written  consent was obtained. Maximal sterile barrier technique was utilized  throughout the procedure. The patient was placed in the right lateral  decubitus position and preliminary images were obtained. An appropriate  site was chosen for biopsy posteriorly over the right lower chest wall.  The skin was marked prepped and draped. IV conscious sedation was  administered consisting of Versed and fentanyl. The patient was  monitored by the IR nurse during the  entire procedure. Utilizing maximal  sterile barrier technique and under local anesthesia a 17-gauge guide  needle was advanced incrementally into this area. The needle was  aspirated and no purulent fluid could be obtained. Multiple 18-gauge  core specimens were then obtained. These were reviewed by the  pathologist and felt to represent reactive fibrous tissue. Multiple  areas were sampled. These were placed in formalin. The needle was  removed and hemostasis achieved.          Impression:      Technically successful biopsy of the pleural-based mass at the right  lung base. Initial preliminary interpretation suggest dense fibrous  tissue. Final path results are pending. Postprocedure chest radiograph  is pending.     Electronically Signed By-Aly Brandon MD On:10/7/2021 10:07 AM  This report was finalized on [id number] by  Aly Brandon MD.    XR Chest 1 View [[id number]] Collected: 10/07/21 1015     Updated: 10/07/21 1127    Narrative:      DATE OF EXAM:  10/7/2021 10:12 AM     PROCEDURE:  XR CHEST 1 VW-     INDICATIONS:  s/p right lung biopsy; A41.9-Sepsis, unspecified organism; L22-Diaper  dermatitis; J18.9-Pneumonia, unspecified organism; E87.7-Yfot-tdavbeunds  and hyponatremia; E87.6-Hypokalemia; R53.1-Weakness; R91.8-Other  nonspecific abnormal finding of lung field       COMPARISON:  AP portable chest 10/5/2021. CT-guided needle biopsy of right lung Mass.  10/7/2021 at 9:44 AM.     TECHNIQUE:   Single radiographic view of the chest was obtained.     FINDINGS:  No pneumothorax is seen. Right lower lobe convex masslike density at the  right lateral costophrenic angle is again noted. Small right pleural  effusion is seen. Convex opacity in the medial left base corresponds to  focal eventration of the left hemidiaphragm, which is seen to better  advantage on prior CT chest study. Interstitial thickening or fibrotic  changes are thought to be present in both lungs. Heart size is normal.  Degenerative endplate  spurring in the thoracic spine. Moderately  advanced degenerative changes of both shoulders.                   Impression:      No visible pneumothorax status post right lower lobe lung mass biopsy  earlier today.     Electronically Signed By-Opal Perales MD On:10/7/2021 10:17 AM  This report was finalized on [id number] by  Opal Perales MD.    US Liver [[id number]] Collected: 10/07/21 0948     Updated: 10/07/21 0952    Narrative:      DATE OF EXAM:  10/7/2021 9:29 AM     PROCEDURE:  US LIVER-     INDICATIONS:  Hepatic transaminase derangement; A41.9-Sepsis, unspecified organism;  L22-Diaper dermatitis; J18.9-Pneumonia, unspecified organism;  E87.0-Dsga-hffoplaqbj and hyponatremia; E87.6-Hypokalemia;  R53.1-Weakness; R91.8-Other nonspecific abnormal finding of lung field     COMPARISON:  CT abdomen and pelvis 10/5/2021.     TECHNIQUE:   Grayscale and color Doppler ultrasound evaluation of the limited abdomen  was performed.     FINDINGS:  The liver size is normal, 16.3 cm in length. The liver maintains a  normal echotexture. There are no focal liver lesions. The liver does not  appear grossly cirrhotic or steatotic. The main portal vein is patent.  Hepatic veins are patent. Common bile duct caliber is normal, 4 mm. No  intrahepatic biliary ductal dilation is identified.        Impression:      Normal hepatic ultrasound.     Electronically Signed By-Opal Perales MD On:10/7/2021 9:50 AM  This report was finalized on [id number] by  Opal Perales MD.          Results for orders placed during the hospital encounter of 10/05/21    XR Chest 1 View    Narrative  DATE OF EXAM:  10/7/2021 10:12 AM    PROCEDURE:  XR CHEST 1 VW-    INDICATIONS:  s/p right lung biopsy; A41.9-Sepsis, unspecified organism; L22-Diaper  dermatitis; J18.9-Pneumonia, unspecified organism; E87.9-Ydco-rbgpzzzepx  and hyponatremia; E87.6-Hypokalemia; R53.1-Weakness; R91.8-Other  nonspecific abnormal finding of lung field    COMPARISON:  AP  portable chest 10/5/2021. CT-guided needle biopsy of right lung Mass.  10/7/2021 at 9:44 AM.    TECHNIQUE:  Single radiographic view of the chest was obtained.    FINDINGS:  No pneumothorax is seen. Right lower lobe convex masslike density at the  right lateral costophrenic angle is again noted. Small right pleural  effusion is seen. Convex opacity in the medial left base corresponds to  focal eventration of the left hemidiaphragm, which is seen to better  advantage on prior CT chest study. Interstitial thickening or fibrotic  changes are thought to be present in both lungs. Heart size is normal.  Degenerative endplate spurring in the thoracic spine. Moderately  advanced degenerative changes of both shoulders.    Impression  No visible pneumothorax status post right lower lobe lung mass biopsy  earlier today.    Electronically Signed By-Opal Perales MD On:10/7/2021 10:17 AM  This report was finalized on [id number] by  Opal Perales MD.      XR Chest 1 View    Narrative  Examination: XR CHEST 1 VW-    Date of Exam: 10/5/2021 8:10 PM    Indication: Weakness.    Comparison: 05/20/2015    Technique: 1 view of the chest    FINDINGS:  The heart size is within normal. There is a lobulated opacity in the  inferior lateral right chest that measures about 5.5 cm; the lower  margin of this is obscured. There is some adjacent pleural thickening or  fluid in the inferior lateral right chest. These findings have developed  since the 2015 exam. There may be some minor patchy airspace opacity in  the lateral aspect of the left midlung. There is aortic calcification.  No significant bone abnormality.    Impression  1. There is an abnormality in the right lower chest. Part of this has a  masslike configuration. It may be due to parenchymal parenchymal  disease, pleural disease, or both. A lung mass is possible. Further  evaluation with chest CT with contrast is recommended.  2. Question some minor opacity in the lateral left  midlung which may be  due to pneumonia.    Electronically Signed By-Margarita Cooper MD On:10/5/2021 9:06 PM  This report was finalized on [id number] by  Margarita Cooper MD.      Results for orders placed in visit on 04/14/21    SCANNED - IMAGING            ASSESSMENT / PLAN      Sepsis (HCC)    1. HYPONATREMIA/SIADH-------Stable and in safe range s/p Samsca. Follow with po fluid restriction alone but will tighten to 1200 cc/day. No neuro sx. Off thiazides    2. PNA/RIGHT LUNG MASS------per Pulmonary and Hem/Onc    3. BENIGN ESSENTIAL HTN-----BP okay. BP meds on hold    4. OA/DJD    5. HYPERLIPIDEMIA-------On Statin    6. ANEMIA------IV iron for LASHAY    7. DVT PROPHYLAXIS-----Lovenox    8. HYPOKALEMIA-----Replaced    9. HYPOMAGNESEMIA-----Replaced    Ivis Barnes MD  [facility]  [phone]  10/09/21  07:19 EDT

## 2021-10-09 NOTE — PROGRESS NOTES
Hematology/Oncology Inpatient Progress Note    PATIENT NAME: Prabhjot Roldan  : 1935  MRN: [id number]    CHIEF COMPLAINT: Lethargy and altered mental status    HISTORY OF PRESENT ILLNESS:    86 y.o. male presented to [facility] on 10/5/2021 with a complaint of altered mental status.  He was noted to be lethargic, hypotensive upon presentation.  Electrolyte abnormalities and community-acquired pneumonia is suspected and he was admitted.  Incidentally CT of the chest showed right lung mass lower lobe.     10/06/21  Hematology/Oncology was consulted for right lung mass.  His daughter was there.  Is having diminished appetite.  He is not able to eat due to lack of dentures since he lost left home.     Past Medical History:  #1 hypertension  2.  Hyperlipidemia  3.  Arthritis  Surgical History:  Hernia repair  Social History:  He was ex-smoker.  He quit smoking 40 years ago.  There is no history of alcohol abuse.  Family History:  1.  Mother had carcinoma.  She also suffered from diabetes mellitus.  2.  Father had prostate carcinoma.  He  from myocardial infarction.  Allergies:  No known allergies.    INTERVAL HISTORY:  • 10/7/2021 CT-guided lung biopsy was done.  • 10/8/2021 path report is pending.  WBC 13, hemoglobin 11.1, platelet count 300,000.   (1-40)  (1-41) total bilirubin 0.9 (0-1.2)  • 10/9/2021 right lung biopsy showed invasive squamous cell carcinoma, CK 5/6+, TTF-1 negative  •     Subjective   Patient continues to feel fatigued and sleeps most of the time    ROS:  Review of Systems   Constitutional: Positive for activity change and appetite change. Negative for chills, fatigue, fever and unexpected weight change.   HENT: Negative for congestion, dental problem, hearing loss, mouth sores, nosebleeds, sore throat and trouble swallowing.    Eyes: Negative for photophobia and visual disturbance.   Respiratory: Negative for cough and shortness of breath.    Cardiovascular:  "Negative for chest pain, palpitations and leg swelling.   Gastrointestinal: Negative for abdominal distention, abdominal pain, blood in stool, constipation, diarrhea, nausea and vomiting.   Endocrine: Negative for cold intolerance and heat intolerance.   Genitourinary: Negative for decreased urine volume, difficulty urinating, frequency, hematuria and urgency.   Musculoskeletal: Negative for arthralgias, back pain and gait problem.   Skin: Negative for rash and wound.   Neurological: Negative for dizziness, tremors, weakness, light-headedness, numbness and headaches.   Hematological: Negative for adenopathy. Does not bruise/bleed easily.   Psychiatric/Behavioral: Positive for confusion. Negative for hallucinations. The patient is not nervous/anxious.    All other systems reviewed and are negative.       MEDICATIONS:    Scheduled Meds:  aspirin, 81 mg, Oral, Daily  atorvastatin, 20 mg, Oral, Nightly  budesonide, 0.5 mg, Nebulization, BID - RT  cefTRIAXone, 1 g, Intravenous, Q24H  dilTIAZem CD, 180 mg, Oral, Q24H  enoxaparin, 40 mg, Subcutaneous, Daily  ferric gluconate, 125 mg, Intravenous, Daily  ipratropium-albuterol, 3 mL, Nebulization, 4x Daily - RT       Continuous Infusions:      PRN Meds:  HYDROcodone-acetaminophen  •  magnesium sulfate **OR** magnesium sulfate **OR** magnesium sulfate  •  potassium chloride **OR** potassium chloride **OR** potassium chloride  •  [COMPLETED] Insert peripheral IV **AND** sodium chloride  •  tiZANidine     ALLERGIES:  No Known Allergies    Objective    VITALS:   /55 (BP Location: Left arm, Patient Position: Lying)   Pulse 77   Temp 97.8 °F (36.6 °C) (Oral)   Resp 13   Ht 175.3 cm (69\")   Wt 86.5 kg (190 lb 11.2 oz)   SpO2 93%   BMI 28.16 kg/m²     PHYSICAL EXAM:  Physical Exam  Vitals and nursing note reviewed.   Constitutional:       General: He is not in acute distress.     Appearance: Normal appearance. He is well-developed. He is not diaphoretic.   HENT:      " Head: Normocephalic and atraumatic.      Nose: Nose normal.      Mouth/Throat:      Mouth: Mucous membranes are moist.      Pharynx: Oropharynx is clear. No oropharyngeal exudate or posterior oropharyngeal erythema.   Eyes:      General: No scleral icterus.     Extraocular Movements: Extraocular movements intact.      Conjunctiva/sclera: Conjunctivae normal.      Pupils: Pupils are equal, round, and reactive to light.   Cardiovascular:      Rate and Rhythm: Normal rate and regular rhythm.      Heart sounds: Normal heart sounds. No murmur heard.      Pulmonary:      Effort: Pulmonary effort is normal. No respiratory distress.      Breath sounds: No wheezing or rales.      Comments: There is a diminished breath sounds in the right infrascapular area  Chest:   Breasts:      Right: No supraclavicular adenopathy.      Left: No supraclavicular adenopathy.       Abdominal:      General: Bowel sounds are normal. There is no distension.      Palpations: Abdomen is soft. There is no mass.      Tenderness: There is no abdominal tenderness. There is no guarding.   Genitourinary:     Comments: Deferred   Musculoskeletal:         General: No swelling, tenderness or deformity. Normal range of motion.      Cervical back: Normal range of motion and neck supple.      Right lower leg: No edema.      Left lower leg: No edema.   Lymphadenopathy:      Cervical: No cervical adenopathy.      Upper Body:      Right upper body: No supraclavicular adenopathy.      Left upper body: No supraclavicular adenopathy.   Skin:     General: Skin is warm and dry.      Coloration: Skin is not pale.      Findings: No bruising, erythema or rash.   Neurological:      General: No focal deficit present.      Mental Status: He is alert and oriented to person, place, and time.      Coordination: Coordination normal.   Psychiatric:         Mood and Affect: Mood normal.         Behavior: Behavior normal.         Thought Content: Thought content normal.            RECENT LABS:  Lab Results (last 24 hours)     Procedure Component Value Units Date/Time    Hepatitis Diagnostic Profile [[id number]] Collected: 10/08/21 1205    Specimen: Blood Updated: 10/09/21 0816     Hep A IgM Negative     Hepatitis B Surface Ag Negative     Hep B Core IgM Negative    Narrative:      Performed at:  [address]  [facility]  [id number]  : Abdirizak Gill PhD, Phone:  [phone]    Magnesium [[id number]]  (Normal) Collected: 10/09/21 0531    Specimen: Blood Updated: 10/09/21 0635     Magnesium 2.2 mg/dL     Creatinine, Serum [[id number]]  (Abnormal) Collected: 10/09/21 0531    Specimen: Blood Updated: 10/09/21 0626     Creatinine 0.59 mg/dL      eGFR Non African Amer 131 mL/min/1.73     Narrative:      GFR Normal >60  Chronic Kidney Disease <60  Kidney Failure <15      Basic Metabolic Panel [[id number]]  (Abnormal) Collected: 10/09/21 0117    Specimen: Blood Updated: 10/09/21 0414     Glucose 111 mg/dL      BUN 18 mg/dL      Creatinine 0.62 mg/dL      Sodium 130 mmol/L      Potassium 3.5 mmol/L      Chloride 92 mmol/L      CO2 30.0 mmol/L      Calcium 8.5 mg/dL      eGFR Non African Amer 124 mL/min/1.73      BUN/Creatinine Ratio 29.0     Anion Gap 8.0 mmol/L     Narrative:      GFR Normal >60  Chronic Kidney Disease <60  Kidney Failure <15      Blood Culture - Blood, Arm, Right [[id number]] Collected: 10/05/21 2001    Specimen: Blood from Arm, Right Updated: 10/08/21 2015     Blood Culture No growth at 3 days    Blood Culture - Blood, Arm, Left [[id number]] Collected: 10/05/21 1951    Specimen: Blood from Arm, Left Updated: 10/08/21 2015     Blood Culture No growth at 3 days    Folate [[id number]]  (Normal) Collected: 10/08/21 1205    Specimen: Blood Updated: 10/08/21 2010     Folate 13.40 ng/mL     Narrative:      Results may be falsely increased if patient taking Biotin.      Vitamin B12 [[id number]]  (Abnormal) Collected: 10/08/21 1205    Specimen: Blood  Updated: 10/08/21 2010     Vitamin B-12 >2,000 pg/mL     Narrative:      Results may be falsely increased if patient taking Biotin.      Immunofixation, Serum [[id number]]  (Abnormal) Collected: 10/07/21 0547    Specimen: Blood Updated: 10/08/21 1412     Immunofixation Result, Serum Comment:     Comment: Presence of monoclonal protein is unclear at this time. Suggest  repeat in 3 to 6 months if clinically indicated.        IgG 1703 mg/dL      IgA 558 mg/dL      IgM 68 mg/dL     Narrative:      Performed at:  [address] [place] [address]  [id number]  : Abdirizak Gill PhD, Phone:  [phone]    Ferritin [[id number]]  (Abnormal) Collected: 10/08/21 1205    Specimen: Blood Updated: 10/08/21 1332     Ferritin 951.70 ng/mL     Narrative:      Results may be falsely decreased if patient taking Biotin.      Basic Metabolic Panel [[id number]]  (Abnormal) Collected: 10/08/21 1205    Specimen: Blood Updated: 10/08/21 1327     Glucose 153 mg/dL      BUN 21 mg/dL      Creatinine 0.66 mg/dL      Sodium 127 mmol/L      Potassium 3.6 mmol/L      Comment: Slight hemolysis detected by analyzer. Results may be affected.        Chloride 87 mmol/L      CO2 26.0 mmol/L      Calcium 8.8 mg/dL      eGFR Non African Amer 115 mL/min/1.73      BUN/Creatinine Ratio 31.8     Anion Gap 14.0 mmol/L     Narrative:      GFR Normal >60  Chronic Kidney Disease <60  Kidney Failure <15      Iron Profile [[id number]]  (Abnormal) Collected: 10/08/21 1205    Specimen: Blood Updated: 10/08/21 1327     Iron 29 mcg/dL      Iron Saturation 9 %      Transferrin 216 mg/dL      TIBC 322 mcg/dL     Immunoglobulin Free LT Chains Blood [[id number]] Collected: 10/08/21 1205    Specimen: Blood Updated: 10/08/21 1258    Immunofixation, Serum [[id number]] Collected: 10/08/21 1205    Specimen: Blood Updated: 10/08/21 1257    Tissue Pathology Exam [[id number]] Collected: 10/07/21 0913    Specimen: Tissue from Lung, Right Lower Lobe Updated:  "10/08/21 1227     Case Report --     Surgical Pathology Report                         Case: [id number]                                  Authorizing Provider:  Uzma Ochoa MD             Collected:           10/07/2021 09:52 AM          Ordering Location:     [facility]       Received:            10/07/2021 10:16 AM                                 EMERGENCY DEPARTMENT                                                         Pathologist:           Bogdan Cline MD                                                            Intraop:               Chico Su MD                                                             Specimen:    Lung, Right Lower Lobe                                                                      Final Diagnosis --     Mass, right lower lobe, biopsies:    Invasive poorly differentiated squamous cell carcinoma     See comment    NEDA/tkd        Comment --     Immunohistochemical stains were applied with valid controls and are reported as follows: The tumor is positive for cytokeratin 5/6, p40 and p63. The tumor is negative for cytokeratin 7, TTF-1 and napsin A. The immunohistochemical staining pattern supports the rendered diagnosis.     NEDA/tkd        Intraoperative Consultation --     TP DX#1: Atypical.     TP DX#2: Atypical.     TP DX#3: Atypical.     TP DX#4: Atypical.     Chico Su MD       Gross Description --     Received initially without fixative for immediate evaluation touch prep diagnosis and then placed in a container of formalin designated \"Right lower lobe\" are multiple whitish core biopsies of soft tissue measuring 0.1 cm in diameter and range up to 1.6 cm in length, submitted in one cassette.      NEDA/sms             PENDING RESULTS: Right lung biopsy report is pending    IMAGING REVIEWED:  XR Chest 1 View    Result Date: 10/7/2021  No visible pneumothorax status post right lower lobe lung mass biopsy earlier today.  Electronically Signed By-Opal Perales MD " On:10/7/2021 10:17 AM This report was finalized on [id number] by  Opal Perales MD.      I have reviewed the patient's labs, imaging, reports, and other clinician documentation.    Assessment/Plan   ASSESSMENT:  1.Right lung lower lobe mass, characterized as hypodense lesion: Patient had a biopsy the biopsy report is pending.  Attempted aspiration of this mass was not successful and it suggestive of solid or thick purulent effusion.  He is currently receiving antibiotics with ceftriaxone.  Final Diagnosis   Mass, right lower lobe, biopsies:    Invasive poorly differentiated squamous cell carcinoma     See comment        ,7   Comment    Immunohistochemical stains were applied with valid controls and are reported as follows: The tumor is positive for cytokeratin 5/6, p40 and p63. The tumor is negative for cytokeratin 7, TTF-1 and napsin A. The immunohistochemical staining pattern supports the rendered diagnosis   No family members with him at this time.  I will diebolt this information in the presence of family members.  2.Anemia: He has normochromic normocytic anemia.  Iron profile is inconclusive with elevated ferritin and iron saturation 9%.  B12 and folic acid are normal     3.Elevated LFTs: There is no evidence of metastatic disease in the liver based on the imaging.  Hepatitis profile is negative.  He will require total body PET scan as an outpatient since lung biopsy showed squamous cell carcinoma.  4.  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  He is currently getting Samsca and managed by nephrologist    PLAN:  1. We will discuss his biopsy report-squamous cell carcinoma of the right lung when the family members are present.  2. Total body PET scan as an outpatient.            I discussed the patients findings and my recommendations with patient.

## 2021-10-10 NOTE — PLAN OF CARE
Assessment: Prabhjot Roldan presents with functional mobility impairments which indicate the need for skilled intervention. Tolerating session today without incident. Patient did not tolerate much activity due to pain. 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 On 2L O2, sats 86>91%, HR 90>122. Plans are for rehab at [place].Will continue to follow and progress as tolerated.

## 2021-10-10 NOTE — CASE MANAGEMENT/SOCIAL WORK
Continued Stay Note  MATILDE Reeves     Patient Name: Prabhjot Roldan  MRN: [id number]  Today's Date: 10/10/2021    Admit Date: 10/5/2021     Discharge Plan     Row Name 10/10/21 1747       Plan    Plan PT recommending inpatient rehab. Referral made to [facility]; pending acceptance. No precert needed. No PASRR needed for [facility].    Patient/Family in Agreement with Plan yes    Plan Comments Met with the patient and family at bedside to discuss inpatient rehab choices. 1) [facility] 2) [place]. Referral made to [facility]. Pending acceptance.              Met with patient in room wearing PPE: mask, face shield/goggles.     Maintained distance greater than six feet and spent less than 15 minutes in the room.      Renee Giraldo RN

## 2021-10-10 NOTE — PROGRESS NOTES
LOS: 4 days   Patient Care Team:  Herve Baez MD as PCP - General (Family Medicine)  Uche Mojica MD as Consulting Physician (Nephrology)  Estefania Sanchez MD as Consulting Physician (Hematology and Oncology)    Subjective:  Neck pain now not controlled with oral pain meds, constipation    Objective:   Appears Weak      Review of Systems:   Review of Systems   Constitutional: Positive for activity change.   Neurological: Positive for weakness.   Positive for neck pain and headaches        Vital Signs  Temp:  [97.3 °F (36.3 °C)-98.4 °F (36.9 °C)] 97.3 °F (36.3 °C)  Heart Rate:  [] 101  Resp:  [13-19] 18  BP: (107-119)/(57-69) 119/69    Physical Exam:  Physical Exam  Constitutional:       General: He is not in acute distress.  Cardiovascular:      Rate and Rhythm: Rhythm irregular.      Heart sounds: Normal heart sounds.   Pulmonary:      Breath sounds: Normal breath sounds.   Skin:     General: Skin is warm.      Pt in neck brace, neck pain with movement    Radiology:  CT Head Without Contrast    Result Date: 10/5/2021  No acute intracranial abnormality. Electronically signed by:  Lenny Mckee  10/5/2021 9:37 PM    CT Chest With Contrast Diagnostic    Result Date: 10/5/2021  1. There is a large low-density mass in the right lung base measuring 9.5 x 5.3 cm. This is favored to represent a neoplastic process which must be considered and excluded. This lesion would be amenable to CT-guided biopsy as determined clinically. Alternatively, a PET/CT study could be obtained for better characterization. Follow-up is recommended. 2. Reticulonodular type infiltrates in the right upper lobe with additional nodular type groundglass and confluent groundglass airspace opacities in the left upper lobe and lingula. Findings are most likely related to infectious bronchopneumonia, possibly related to viral pneumonia and/or aspiration. COVID pneumonia should be considered. 3. Element of interlobular septal  thickening may represent early pulmonary edema. 4. Emphysema. 5. Advanced coronary artery calcifications. 6. Enlarged subcarinal lymph node measuring 1.8 cm in short axis. Metastatic disease must be excluded. 7. Hepatic steatosis. The liver also demonstrates a very subtle micronodular contour which may represent liver disease or early cirrhosis. Correlation with hepatic function is recommended. 8. Diverticulosis. 9. Severe atherosclerotic disease. 10. Additional incidental and chronic findings as above. *[employer] GUIDELINES: Low risk patient: <6mm - Low Risk, no further f/u >6-8mm - low dose CT 6-12 mo, then 2nd f/u CT at 18-24mo >8mm - low dose CT 3,9,24mo OR PET/CT OR Biopsy High Risk Patient: <6mm - optional CT at 12 mo >6-8mm - low dose CT 6-12 mo, then 2nd f/u CT at 18-24mo >8mm - low dose CT at 3 mo, OR PET/CT OR Biopsy MULTIPLE NODULES: Low Risk Patient: <6mm - Low Risk, no further f/u >6-8mm - low dose CT 3-6 mo, then 2nd f/u CT at 18-24mo >8mm - low dose CT 3-6 mo, then 2nd f/u CT at 18-24mo High Risk Patient: <6mm - High risk, multiple nodules, optional CT at 12 mo >6-8mm - low dose CT 3-6 mo, then 2nd f/u CT at 18-24mo >8mm - low dose CT 3-6 mo, then 2nd f/u CT at 18-24mo Slot [id number] Electronically signed by:  Arcenio Vanegas M.D.  10/5/2021 9:30 PM    CT Cervical Spine Without Contrast    Result Date: 10/9/2021  1.Osseous destructive changes involving right C1 lateral mass, concerning for metastatic disease. 2.Degenerative changes of cervical spine as described above.  Electronically Signed By-Bogdan Marquez MD On:10/9/2021 5:03 PM This report was finalized on [id number] by  Bogdan Marquez MD.    CT Abdomen Pelvis With Contrast    Result Date: 10/5/2021  1. There is a large low-density mass in the right lung base measuring 9.5 x 5.3 cm. This is favored to represent a neoplastic process which must be considered and excluded. This lesion would be amenable to CT-guided biopsy as determined  clinically. Alternatively, a PET/CT study could be obtained for better characterization. Follow-up is recommended. 2. Reticulonodular type infiltrates in the right upper lobe with additional nodular type groundglass and confluent groundglass airspace opacities in the left upper lobe and lingula. Findings are most likely related to infectious bronchopneumonia, possibly related to viral pneumonia and/or aspiration. COVID pneumonia should be considered. 3. Element of interlobular septal thickening may represent early pulmonary edema. 4. Emphysema. 5. Advanced coronary artery calcifications. 6. Enlarged subcarinal lymph node measuring 1.8 cm in short axis. Metastatic disease must be excluded. 7. Hepatic steatosis. The liver also demonstrates a very subtle micronodular contour which may represent liver disease or early cirrhosis. Correlation with hepatic function is recommended. 8. Diverticulosis. 9. Severe atherosclerotic disease. 10. Additional incidental and chronic findings as above. *[employer] GUIDELINES: Low risk patient: <6mm - Low Risk, no further f/u >6-8mm - low dose CT 6-12 mo, then 2nd f/u CT at 18-24mo >8mm - low dose CT 3,9,24mo OR PET/CT OR Biopsy High Risk Patient: <6mm - optional CT at 12 mo >6-8mm - low dose CT 6-12 mo, then 2nd f/u CT at 18-24mo >8mm - low dose CT at 3 mo, OR PET/CT OR Biopsy MULTIPLE NODULES: Low Risk Patient: <6mm - Low Risk, no further f/u >6-8mm - low dose CT 3-6 mo, then 2nd f/u CT at 18-24mo >8mm - low dose CT 3-6 mo, then 2nd f/u CT at 18-24mo High Risk Patient: <6mm - High risk, multiple nodules, optional CT at 12 mo >6-8mm - low dose CT 3-6 mo, then 2nd f/u CT at 18-24mo >8mm - low dose CT 3-6 mo, then 2nd f/u CT at 18-24mo Slot 67 Electronically signed by:  Arcenio Vanegas M.D.  10/5/2021 9:30 PM    US Liver    Result Date: 10/7/2021  Normal hepatic ultrasound.  Electronically Signed By-Opal Perales MD On:10/7/2021 9:50 AM This report was finalized on [id number]  by  Opal Perales MD.    XR Chest 1 View    Result Date: 10/7/2021  No visible pneumothorax status post right lower lobe lung mass biopsy earlier today.  Electronically Signed By-Opal Perales MD On:10/7/2021 10:17 AM This report was finalized on [id number] by  Opal Perales MD.    XR Chest 1 View    Result Date: 10/5/2021  1. There is an abnormality in the right lower chest. Part of this has a masslike configuration. It may be due to parenchymal parenchymal disease, pleural disease, or both. A lung mass is possible. Further evaluation with chest CT with contrast is recommended. 2. Question some minor opacity in the lateral left midlung which may be due to pneumonia.  Electronically Signed By-Margarita Cooper MD On:10/5/2021 9:06 PM This report was finalized on [id number] by  Margarita Cooper MD.    CT Needle Biopsy Lung    Result Date: 10/7/2021  Technically successful biopsy of the pleural-based mass at the right lung base. Initial preliminary interpretation suggest dense fibrous tissue. Final path results are pending. Postprocedure chest radiograph is pending.  Electronically Signed By-Aly Brandon MD On:10/7/2021 10:07 AM This report was finalized on [id number] by  Aly Brandon MD.         Results Review:     I reviewed the patient's new clinical results.  I reviewed the patient's new imaging results and agree with the interpretation.    Medication Review:   Scheduled Meds:aspirin, 81 mg, Oral, Daily  budesonide, 0.5 mg, Nebulization, BID - RT  fentaNYL, 1 patch, Transdermal, Q72H   And  [START ON 10/11/2021] Check Fentanyl Patch Placement, 1 each, Does not apply, Q12H  dilTIAZem CD, 180 mg, Oral, Q24H  docusate sodium, 100 mg, Oral, BID  enoxaparin, 40 mg, Subcutaneous, Daily  ipratropium-albuterol, 3 mL, Nebulization, 4x Daily - RT      Continuous Infusions:   PRN Meds:.bisacodyl  •  HYDROcodone-acetaminophen  •  magnesium hydroxide  •  magnesium sulfate **OR** magnesium sulfate **OR** magnesium  sulfate  •  potassium chloride **OR** potassium chloride **OR** potassium chloride  •  [COMPLETED] Insert peripheral IV **AND** sodium chloride  •  tiZANidine    Labs:    CBC    Results from last 7 days   Lab Units 10/10/21  0442 10/08/21  0533 10/07/21  0546 10/06/21  0906 10/05/21  1951   WBC 10*3/mm3 10.10 13.00* 15.60* 19.60* 14.20*   HEMOGLOBIN g/dL 9.2* 11.1* 11.7* 12.6* 11.7*   PLATELETS 10*3/mm3 363 300 317 316 392     BMP   Results from last 7 days   Lab Units 10/10/21  0442 10/09/21  0531 10/09/21  0117 10/08/21  1205 10/08/21  0533 10/08/21  0042 10/07/21  1902 10/07/21  1252 10/07/21  1252 10/07/21  0547 10/06/21  0906 10/05/21  1951 10/05/21  1951   SODIUM mmol/L 130*  --  130* 127* 127* 127* 129*  --  125*   < > 123*   < > 120*   POTASSIUM mmol/L 3.5  --  3.5 3.6 3.5 3.6 3.7  --  3.6   < > 3.1*   < > 2.5*   CHLORIDE mmol/L 91*  --  92* 87* 89* 89* 91*  --  85*   < > 82*   < > 75*   CO2 mmol/L 30.0*  --  30.0* 26.0 25.0 26.0 24.0  --  26.0   < > 25.0   < > 21.0*   BUN mg/dL 15  --  18 21 20 22 22  --  22   < > 14   < > 16   CREATININE mg/dL 0.59* 0.59* 0.62* 0.66* 0.62* 0.65* 0.66*   < > 0.65*   < > 0.67*   < > 0.96   GLUCOSE mg/dL 87  --  111* 153* 103* 128* 145*  --  103*   < > 101*   < > 178*   MAGNESIUM mg/dL 1.9 2.2  --   --   --   --   --   --   --   --  1.5*  --  1.5*   PHOSPHORUS mg/dL 2.3*  --   --   --   --   --   --   --   --   --   --   --   --     < > = values in this interval not displayed.     Cr Clearance Estimated Creatinine Clearance: 81.8 mL/min (A) (by C-G formula based on SCr of 0.59 mg/dL (L)).  Coag   Results from last 7 days   Lab Units 10/05/21  1951   INR  1.18*   APTT seconds 30.8     HbA1C No results found for: HGBA1C  Blood Glucose No results found for: POCGLU  Infection   Results from last 7 days   Lab Units 10/05/21  2001 10/05/21  1957 10/05/21  1951   BLOODCX  No growth at 4 days  --  No growth at 4 days   URINECX   --  No growth  --      CMP   Results from last 7 days    Lab Units 10/10/21  0442 10/09/21  0531 10/09/21  0117 10/08/21  1205 10/08/21  0533 10/08/21  0042 10/07/21  1902 10/07/21  1252 10/07/21  1252 10/07/21  0547 10/07/21  0547 10/06/21  0906 10/06/21  0906 10/05/21  1951 10/05/21  1951   SODIUM mmol/L 130*  --  130* 127* 127* 127* 129*  --  125*   < > 124*   < > 123*   < > 120*   POTASSIUM mmol/L 3.5  --  3.5 3.6 3.5 3.6 3.7  --  3.6   < > 3.7   < > 3.1*   < > 2.5*   CHLORIDE mmol/L 91*  --  92* 87* 89* 89* 91*  --  85*   < > 87*   < > 82*   < > 75*   CO2 mmol/L 30.0*  --  30.0* 26.0 25.0 26.0 24.0  --  26.0   < > 24.0   < > 25.0   < > 21.0*   BUN mg/dL 15  --  18 21 20 22 22  --  22   < > 22   < > 14   < > 16   CREATININE mg/dL 0.59* 0.59* 0.62* 0.66* 0.62* 0.65* 0.66*   < > 0.65*   < > 0.65*   < > 0.67*   < > 0.96   GLUCOSE mg/dL 87  --  111* 153* 103* 128* 145*  --  103*   < > 90   < > 101*   < > 178*   ALBUMIN g/dL 2.70*  --   --   --  2.50*  --   --   --   --   --  2.60*  --  3.00*  --  3.30*   BILIRUBIN mg/dL 0.5  --   --   --  0.9  --   --   --   --   --  0.9  --  0.8  --  0.6   ALK PHOS U/L 68  --   --   --  74  --   --   --   --   --  77  --  88  --  83   AST (SGOT) U/L 236*  --   --   --  965*  --   --   --   --   --  814*  --  260*  --  155*   ALT (SGPT) U/L 412*  --   --   --  881*  --   --   --   --   --  592*  --  177*  --  92*   LIPASE U/L  --   --   --   --   --   --   --   --   --   --   --   --   --   --  47    < > = values in this interval not displayed.     UA    Results from last 7 days   Lab Units 10/05/21  1957   NITRITE UA  Negative   WBC UA /HPF 31-50*   BACTERIA UA /HPF None Seen   SQUAM EPITHEL UA /HPF 7-12*   URINECX  No growth     Radiology(recent) CT Cervical Spine Without Contrast    Result Date: 10/9/2021  1.Osseous destructive changes involving right C1 lateral mass, concerning for metastatic disease. 2.Degenerative changes of cervical spine as described above.  Electronically Signed By-Bogdan Marquez MD On:10/9/2021 5:03 PM This  report was finalized on [id number] by  Bogdan Marquez MD.     Assessment:    Acute mental status changes secondary to acute metabolic encephalopathy secondary to community-acquired pneumonia  Acute sepsis secondary to the above  Acute right lower lobe lung mass  Status post ultrasound-guided biopsy of right lower lobe mass 10/7/2021 which revealed invasive poorly diff squamous cell carcinoma  Panlobular COPD with emphysema  BPH with LUTS  Chronic mucopurulent bronchitis  Hepatic steatosis  Hepatic transaminase derangement  Atherosclerotic heart disease of native coronary arteries with angina pectoris  Coronary calcifications  Chronic kidney disease stage II  Microscopic hematuria  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 Add MOM, suppository and stool softners for constipation  Needs rehab at [place]  Oncology is consulting as well as pulmonology        Kennedi Cárdenas, DAVID  10/10/21  12:37 EDT

## 2021-10-10 NOTE — PROGRESS NOTES
Daily Progress Note        Sepsis (HCC)      Assessment    Right lower lobe mass, S post FNA 10/7/2021 + for Invasive poorly differentiated squamous cell carcinoma with possible metastasis to the cervical spine at less    Generalized weakness and lethargy and fatigue  Hyponatremia possible related to lung mass  COPD  Possible pneumonia with leukocytosis   hypokalemia  UTI     Hepatic steatosis  Hepatic transaminase derangement  -Elevated liver enzymes     Recommendations:    Oncology following  Neurosurgery has been consulted for metastasis to cervical spine atlas     Bronchodilator\inhaled corticosteroid  DVT prophylaxis Lovenox    Will need total body PET scan as outpatient              LOS: 4 days     Subjective         Objective     Vital signs for last 24 hours:  Vitals:    10/09/21 2052 10/10/21 0048 10/10/21 0237 10/10/21 0640   BP: 107/59 112/65 119/57 109/61   BP Location: Left arm Left arm Left arm Left arm   Patient Position: Lying Lying Lying Lying   Pulse:  109 89 88   Resp: 13 18 18 13   Temp: 98.4 °F (36.9 °C) 97.7 °F (36.5 °C) 98 °F (36.7 °C) 97.6 °F (36.4 °C)   TempSrc: Oral Oral Oral Axillary   SpO2:   98% 98%   Weight:    84.1 kg (185 lb 4.8 oz)   Height:           Intake/Output last 3 shifts:  I/O last 3 completed shifts:  In: 720 [P.O.:720]  Out: 1800 [Urine:1800]  Intake/Output this shift:  No intake/output data recorded.      Radiology  Imaging Results (Last 24 Hours)     Procedure Component Value Units Date/Time    CT Cervical Spine Without Contrast [[id number]] Collected: 10/09/21 1659     Updated: 10/09/21 1705    Narrative:      DATE OF EXAM:  10/9/2021 4:27 PM     PROCEDURE:  CT CERVICAL SPINE WO CONTRAST-     INDICATIONS:   Neck pain, acute, known malignancy; A41.9-Sepsis, unspecified organism;  L22-Diaper dermatitis; J18.9-Pneumonia, unspecified organism;  E87.7-Dapb-whzlybxtvc and hyponatremia; E87.6-Hypokalemia;  R53.1-Weakness; R91.8-Other nonspecific abnormal finding of lung field      COMPARISON:   No Comparisons Available     TECHNIQUE:  CT scan of the cervical spine without IV contrast. Automated exposure  control and iterative construction methods were used.      FINDINGS:  No acute fracture is identified. There are destructive changes involving  the right C1 lateral mass. There is minimal anterolisthesis of C4 on C5.  The vertebral body heights appear normal. There are moderate discogenic  changes at C5-C6 through T1-2. No high-grade spinal canal stenosis is  identified. There is uncovertebral hypertrophy and facet arthropathy at  several levels, resulting in up to severe neural foraminal stenosis on  the right at C3-4, on the right at C4-5, and bilaterally at C5-6. The  prevertebral soft tissues are unremarkable.       Impression:      1.Osseous destructive changes involving right C1 lateral mass,  concerning for metastatic disease.  2.Degenerative changes of cervical spine as described above.     Electronically Signed By-Bogdan Marquez MD On:10/9/2021 5:03 PM  This report was finalized on [id number] by  Bogdan Marquez MD.          Labs:  Results from last 7 days   Lab Units 10/10/21  0442   WBC 10*3/mm3 10.10   HEMOGLOBIN g/dL 9.2*   HEMATOCRIT % 26.7*   PLATELETS 10*3/mm3 363     Results from last 7 days   Lab Units 10/10/21  0442   SODIUM mmol/L 130*   POTASSIUM mmol/L 3.5   CHLORIDE mmol/L 91*   CO2 mmol/L 30.0*   BUN mg/dL 15   CREATININE mg/dL 0.59*   CALCIUM mg/dL 8.4*   BILIRUBIN mg/dL 0.5   ALK PHOS U/L 68   ALT (SGPT) U/L 412*   AST (SGOT) U/L 236*   GLUCOSE mg/dL 87         Results from last 7 days   Lab Units 10/10/21  0442 10/08/21  0533 10/07/21  0547   ALBUMIN g/dL 2.70* 2.50* 2.60*     Results from last 7 days   Lab Units 10/05/21  1951   CK TOTAL U/L 97   TROPONIN T ng/mL <0.010         Results from last 7 days   Lab Units 10/10/21  0442   MAGNESIUM mg/dL 1.9     Results from last 7 days   Lab Units 10/05/21  1951   INR  1.18*   APTT seconds 30.8     Results from  last 7 days   Lab Units 10/05/21  1951   TSH uIU/mL 1.420           Meds:   SCHEDULE  aspirin, 81 mg, Oral, Daily  atorvastatin, 20 mg, Oral, Nightly  budesonide, 0.5 mg, Nebulization, BID - RT  cefTRIAXone, 1 g, Intravenous, Q24H  dilTIAZem CD, 180 mg, Oral, Q24H  enoxaparin, 40 mg, Subcutaneous, Daily  ferric gluconate, 125 mg, Intravenous, Daily  ipratropium-albuterol, 3 mL, Nebulization, 4x Daily - RT      Infusions     PRNs  HYDROcodone-acetaminophen  •  magnesium sulfate **OR** magnesium sulfate **OR** magnesium sulfate  •  potassium chloride **OR** potassium chloride **OR** potassium chloride  •  [COMPLETED] Insert peripheral IV **AND** sodium chloride  •  tiZANidine    Physical Exam:  Physical Exam  Vitals reviewed.   Pulmonary:      Breath sounds: Rhonchi present.   Skin:     General: Skin is warm and dry.   Neurological:      Mental Status: He is alert.         ROS  Review of Systems   HENT:        Neck pain   Respiratory: Positive for cough and shortness of breath.    Musculoskeletal: Positive for arthralgias.       Reviewed the patient's new clinical results    Electronically signed by DAVID Quigley

## 2021-10-10 NOTE — PROGRESS NOTES
Hematology/Oncology Inpatient Progress Note    PATIENT NAME: Prabhjot Roldan  : 1935  MRN: [id number]    CHIEF COMPLAINT: Lethargy and altered mental status    HISTORY OF PRESENT ILLNESS:    86 y.o. male presented to [facility] on 10/5/2021 with a complaint of altered mental status.  He was noted to be lethargic, hypotensive upon presentation.  Electrolyte abnormalities and community-acquired pneumonia is suspected and he was admitted.  Incidentally CT of the chest showed right lung mass lower lobe.     10/06/21  Hematology/Oncology was consulted for right lung mass.  His daughter was there.  Is having diminished appetite.  He is not able to eat due to lack of dentures since he lost left home.     Past Medical History:  #1 hypertension  2.  Hyperlipidemia  3.  Arthritis  Surgical History:  Hernia repair  Social History:  He was ex-smoker.  He quit smoking 40 years ago.  There is no history of alcohol abuse.  Family History:  1.  Mother had carcinoma.  She also suffered from diabetes mellitus.  2.  Father had prostate carcinoma.  He  from myocardial infarction.  Allergies:  No known allergies.    INTERVAL HISTORY:  • 10/7/2021 CT-guided lung biopsy was done.  • 10/8/2021 path report is pending.  WBC 13, hemoglobin 11.1, platelet count 300,000.   (1-40)  (1-41) total bilirubin 0.9 (0-1.2)  • 10/9/2021 right lung biopsy showed invasive squamous cell carcinoma, CK 5/6+, TTF-1 negative  • 10/10/2021 CT cervical spine showed C1 lytic lesion.    Subjective   Patient continues to feel fatigued and sleeps most of the time    ROS:  Review of Systems   Constitutional: Positive for activity change and appetite change. Negative for chills, fatigue, fever and unexpected weight change.   HENT: Negative for congestion, dental problem, hearing loss, mouth sores, nosebleeds, sore throat and trouble swallowing.    Eyes: Negative for photophobia and visual disturbance.   Respiratory: Negative for  "cough and shortness of breath.    Cardiovascular: Negative for chest pain, palpitations and leg swelling.   Gastrointestinal: Negative for abdominal distention, abdominal pain, blood in stool, constipation, diarrhea, nausea and vomiting.   Endocrine: Negative for cold intolerance and heat intolerance.   Genitourinary: Negative for decreased urine volume, difficulty urinating, frequency, hematuria and urgency.   Musculoskeletal: Negative for arthralgias, back pain and gait problem.   Skin: Negative for rash and wound.   Neurological: Negative for dizziness, tremors, weakness, light-headedness, numbness and headaches.   Hematological: Negative for adenopathy. Does not bruise/bleed easily.   Psychiatric/Behavioral: Positive for confusion. Negative for hallucinations. The patient is not nervous/anxious.    All other systems reviewed and are negative.       MEDICATIONS:    Scheduled Meds:  aspirin, 81 mg, Oral, Daily  budesonide, 0.5 mg, Nebulization, BID - RT  cefTRIAXone, 1 g, Intravenous, Q24H  dilTIAZem CD, 180 mg, Oral, Q24H  enoxaparin, 40 mg, Subcutaneous, Daily  ipratropium-albuterol, 3 mL, Nebulization, 4x Daily - RT  potassium phosphate (monobasic), 1,000 mg, Oral, Once       Continuous Infusions:      PRN Meds:  HYDROcodone-acetaminophen  •  magnesium sulfate **OR** magnesium sulfate **OR** magnesium sulfate  •  potassium chloride **OR** potassium chloride **OR** potassium chloride  •  [COMPLETED] Insert peripheral IV **AND** sodium chloride  •  tiZANidine     ALLERGIES:  No Known Allergies    Objective    VITALS:   /69 (BP Location: Left arm, Patient Position: Lying)   Pulse 102   Temp 97.3 °F (36.3 °C) (Oral)   Resp 19   Ht 175.3 cm (69\")   Wt 84.1 kg (185 lb 4.8 oz)   SpO2 99%   BMI 27.36 kg/m²     PHYSICAL EXAM:  Physical Exam  Vitals and nursing note reviewed.   Constitutional:       General: He is not in acute distress.     Appearance: Normal appearance. He is well-developed. He is not " diaphoretic.   HENT:      Head: Normocephalic and atraumatic.      Nose: Nose normal.      Mouth/Throat:      Mouth: Mucous membranes are moist.      Pharynx: Oropharynx is clear. No oropharyngeal exudate or posterior oropharyngeal erythema.   Eyes:      General: No scleral icterus.     Extraocular Movements: Extraocular movements intact.      Conjunctiva/sclera: Conjunctivae normal.      Pupils: Pupils are equal, round, and reactive to light.   Cardiovascular:      Rate and Rhythm: Normal rate and regular rhythm.      Heart sounds: Normal heart sounds. No murmur heard.      Pulmonary:      Effort: Pulmonary effort is normal. No respiratory distress.      Breath sounds: No wheezing or rales.      Comments: There is a diminished breath sounds in the right infrascapular area  Chest:   Breasts:      Right: No supraclavicular adenopathy.      Left: No supraclavicular adenopathy.       Abdominal:      General: Bowel sounds are normal. There is no distension.      Palpations: Abdomen is soft. There is no mass.      Tenderness: There is no abdominal tenderness. There is no guarding.   Genitourinary:     Comments: Deferred   Musculoskeletal:         General: No swelling, tenderness or deformity. Normal range of motion.      Cervical back: Normal range of motion and neck supple.      Right lower leg: No edema.      Left lower leg: No edema.   Lymphadenopathy:      Cervical: No cervical adenopathy.      Upper Body:      Right upper body: No supraclavicular adenopathy.      Left upper body: No supraclavicular adenopathy.   Skin:     General: Skin is warm and dry.      Coloration: Skin is not pale.      Findings: No bruising, erythema or rash.   Neurological:      General: No focal deficit present.      Mental Status: He is alert and oriented to person, place, and time.      Coordination: Coordination normal.   Psychiatric:         Mood and Affect: Mood normal.         Behavior: Behavior normal.         Thought Content: Thought  content normal.           RECENT LABS:  Lab Results (last 24 hours)     Procedure Component Value Units Date/Time    Comprehensive Metabolic Panel [[id number]]  (Abnormal) Collected: 10/10/21 0442    Specimen: Blood Updated: 10/10/21 0621     Glucose 87 mg/dL      BUN 15 mg/dL      Creatinine 0.59 mg/dL      Sodium 130 mmol/L      Potassium 3.5 mmol/L      Chloride 91 mmol/L      CO2 30.0 mmol/L      Calcium 8.4 mg/dL      Total Protein 6.6 g/dL      Albumin 2.70 g/dL      ALT (SGPT) 412 U/L      AST (SGOT) 236 U/L      Alkaline Phosphatase 68 U/L      Total Bilirubin 0.5 mg/dL      eGFR Non African Amer 131 mL/min/1.73      Globulin 3.9 gm/dL      A/G Ratio 0.7 g/dL      BUN/Creatinine Ratio 25.4     Anion Gap 9.0 mmol/L     Narrative:      GFR Normal >60  Chronic Kidney Disease <60  Kidney Failure <15      Phosphorus [[id number]]  (Abnormal) Collected: 10/10/21 0442    Specimen: Blood Updated: 10/10/21 0621     Phosphorus 2.3 mg/dL     Magnesium [[id number]]  (Normal) Collected: 10/10/21 0442    Specimen: Blood Updated: 10/10/21 0621     Magnesium 1.9 mg/dL     Calcium, Ionized [[id number]] Collected: 10/10/21 0442    Specimen: Blood Updated: 10/10/21 0612    CBC (No Diff) [[id number]]  (Abnormal) Collected: 10/10/21 0442    Specimen: Blood Updated: 10/10/21 0602     WBC 10.10 10*3/mm3      RBC 3.01 10*6/mm3      Hemoglobin 9.2 g/dL      Hematocrit 26.7 %      MCV 88.6 fL      MCH 30.5 pg      MCHC 34.4 g/dL      RDW 14.9 %      RDW-SD 45.5 fl      MPV 7.4 fL      Platelets 363 10*3/mm3     Blood Culture - Blood, Arm, Right [[id number]]  (Normal) Collected: 10/05/21 2001    Specimen: Blood from Arm, Right Updated: 10/09/21 2015     Blood Culture No growth at 4 days    Narrative:      The previously reported component GRAM STAIN is no longer being reported. Previous result was <null> (Reference Range: [Reference Range]) on 10/8/2021 at 2015 EDT.    Blood Culture - Blood, Arm, Left [[id number]]  (Normal) Collected:  10/05/21 1951    Specimen: Blood from Arm, Left Updated: 10/09/21 2015     Blood Culture No growth at 4 days    Narrative:      The previously reported component GRAM STAIN is no longer being reported. Previous result was <null> (Reference Range: [Reference Range]) on 10/8/2021 at 2015 EDT.          PENDING RESULTS: Right lung biopsy report is pending    IMAGING REVIEWED:  CT Cervical Spine Without Contrast    Result Date: 10/9/2021  1.Osseous destructive changes involving right C1 lateral mass, concerning for metastatic disease. 2.Degenerative changes of cervical spine as described above.  Electronically Signed By-Bogdan Marquez MD On:10/9/2021 5:03 PM This report was finalized on [id number] by  Bogdan Marquez MD.      I have reviewed the patient's labs, imaging, reports, and other clinician documentation.    Assessment/Plan   ASSESSMENT:  1.Right lung lower lobe mass, characterized as hypodense lesion: Patient had a biopsy the biopsy report is pending.  Attempted aspiration of this mass was not successful and it suggestive of solid or thick purulent effusion.  He is currently receiving antibiotics with ceftriaxone.  Final Diagnosis   Mass, right lower lobe, biopsies:    Invasive poorly differentiated squamous cell carcinoma     See comment        ,7   Comment    Immunohistochemical stains were applied with valid controls and are reported as follows: The tumor is positive for cytokeratin 5/6, p40 and p63. The tumor is negative for cytokeratin 7, TTF-1 and napsin A. The immunohistochemical staining pattern supports the rendered diagnosis   No family members with him at this time.  I will divulge this information in the presence of family members. And his sister was here and his metastatic disease is discussed with him  2.Anemia: He has normochromic normocytic anemia.  Iron profile is inconclusive with elevated ferritin and iron saturation 9%.  B12 and folic acid are normal     3.Elevated LFTs: There is no  evidence of metastatic disease in the liver based on the imaging.  Hepatitis profile is negative.  He will require total body PET scan as an outpatient since lung biopsy showed squamous cell carcinoma.  4.  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  He is currently getting Samsca and managed by nephrologist   5.Cervical spine 1 atlas mets: CT of cervical spine showed mets to atlas. Neurosurgical evaluation and XRT consultation will be requested    PLAN:  1. Neurosurgeon evaluation regarding mets Cervical spine atlas  2. Total body PET scan as an outpatient.  3. Radiation oncology consultation            I discussed the patients findings and my recommendations with patient.

## 2021-10-10 NOTE — PROGRESS NOTES
"NEPHROLOGY PROGRESS NOTE------[facility]    [facility]  [phone]  Ivis Barnes MD      Patient Care Team:  Herve Baez MD as PCP - General (Family Medicine)  Uche Mojica MD as Consulting Physician (Nephrology)  Estefania Sanchez MD as Consulting Physician (Hematology and Oncology)      Provider:  Ivis Barnes MD  Patient Name: Prabhjot Roldan  :  1935    SUBJECTIVE:    F/U HYPONATREMIA    No major new events overnight. No SOB, CP. No HA, blurry vision, tremors, or seizures    Medication:  aspirin, 81 mg, Oral, Daily  atorvastatin, 20 mg, Oral, Nightly  budesonide, 0.5 mg, Nebulization, BID - RT  cefTRIAXone, 1 g, Intravenous, Q24H  dilTIAZem CD, 180 mg, Oral, Q24H  enoxaparin, 40 mg, Subcutaneous, Daily  ferric gluconate, 125 mg, Intravenous, Daily  ipratropium-albuterol, 3 mL, Nebulization, 4x Daily - RT           OBJECTIVE    Vital Sign Min/Max for last 24 hours  Temp  Min: 97.6 °F (36.4 °C)  Max: 98.4 °F (36.9 °C)   BP  Min: 93/51  Max: 119/57   Pulse  Min: 80  Max: 109   Resp  Min: 13  Max: 20   SpO2  Min: 96 %  Max: 99 %   No data recorded   Weight  Min: 84.1 kg (185 lb 4.8 oz)  Max: 84.1 kg (185 lb 4.8 oz)     Flowsheet Rows      First Filed Value   Admission Height  175.3 cm (69\") Documented at 10/05/2021 1928   Admission Weight  86.2 kg (190 lb) Documented at 10/05/2021 1928          No intake/output data recorded.  I/O last 3 completed shifts:  In: 720 [P.O.:720]  Out: 1800 [Urine:1800]    Physical Exam:  General Appearance: alert, appears stated age and cooperative  Head: normocephalic, without obvious abnormality and atraumatic  Eyes: conjunctivae and sclerae normal and no icterus  Neck: supple and no JVD  Lungs: +SCATTERED RHONCHI  Heart: regular rhythm & normal rate and normal S1, S2 +ROGERIO  Chest: Wall no abnormalities observed  Abdomen: normal bowel sounds and soft non-tender +OBESITY  Extremities: " moves extremities well, no edema, no cyanosis and no redness +DJD  Skin: no bleeding, bruising or rash, turgor normal, color normal and no lesions noted  Neurologic: Alert, and oriented. No focal deficits    Labs:    WBC WBC   Date Value Ref Range Status   10/10/2021 10.10 3.40 - 10.80 10*3/mm3 Final   10/08/2021 13.00 (H) 3.40 - 10.80 10*3/mm3 Final      HGB Hemoglobin   Date Value Ref Range Status   10/10/2021 9.2 (L) 13.0 - 17.7 g/dL Final   10/08/2021 11.1 (L) 13.0 - 17.7 g/dL Final      HCT Hematocrit   Date Value Ref Range Status   10/10/2021 26.7 (L) 37.5 - 51.0 % Final   10/08/2021 33.1 (L) 37.5 - 51.0 % Final      Platlets No results found for: LABPLAT   MCV MCV   Date Value Ref Range Status   10/10/2021 88.6 79.0 - 97.0 fL Final   10/08/2021 87.9 79.0 - 97.0 fL Final          Sodium Sodium   Date Value Ref Range Status   10/10/2021 130 (L) 136 - 145 mmol/L Final   10/09/2021 130 (L) 136 - 145 mmol/L Final   10/08/2021 127 (L) 136 - 145 mmol/L Final   10/08/2021 127 (L) 136 - 145 mmol/L Final   10/08/2021 127 (L) 136 - 145 mmol/L Final   10/07/2021 129 (L) 136 - 145 mmol/L Final   10/07/2021 125 (L) 136 - 145 mmol/L Final      Potassium Potassium   Date Value Ref Range Status   10/10/2021 3.5 3.5 - 5.2 mmol/L Final   10/09/2021 3.5 3.5 - 5.2 mmol/L Final   10/08/2021 3.6 3.5 - 5.2 mmol/L Final     Comment:     Slight hemolysis detected by analyzer. Results may be affected.   10/08/2021 3.5 3.5 - 5.2 mmol/L Final     Comment:     Slight hemolysis detected by analyzer. Results may be affected.   10/08/2021 3.6 3.5 - 5.2 mmol/L Final   10/07/2021 3.7 3.5 - 5.2 mmol/L Final     Comment:     Slight hemolysis detected by analyzer. Results may be affected.   10/07/2021 3.6 3.5 - 5.2 mmol/L Final     Comment:     Slight hemolysis detected by analyzer. Results may be affected.      Chloride Chloride   Date Value Ref Range Status   10/10/2021 91 (L) 98 - 107 mmol/L Final   10/09/2021 92 (L) 98 - 107 mmol/L Final    10/08/2021 87 (L) 98 - 107 mmol/L Final   10/08/2021 89 (L) 98 - 107 mmol/L Final   10/08/2021 89 (L) 98 - 107 mmol/L Final   10/07/2021 91 (L) 98 - 107 mmol/L Final   10/07/2021 85 (L) 98 - 107 mmol/L Final      CO2 CO2   Date Value Ref Range Status   10/10/2021 30.0 (H) 22.0 - 29.0 mmol/L Final   10/09/2021 30.0 (H) 22.0 - 29.0 mmol/L Final   10/08/2021 26.0 22.0 - 29.0 mmol/L Final   10/08/2021 25.0 22.0 - 29.0 mmol/L Final   10/08/2021 26.0 22.0 - 29.0 mmol/L Final   10/07/2021 24.0 22.0 - 29.0 mmol/L Final   10/07/2021 26.0 22.0 - 29.0 mmol/L Final      BUN BUN   Date Value Ref Range Status   10/10/2021 15 8 - 23 mg/dL Final   10/09/2021 18 8 - 23 mg/dL Final   10/08/2021 21 8 - 23 mg/dL Final   10/08/2021 20 8 - 23 mg/dL Final   10/08/2021 22 8 - 23 mg/dL Final   10/07/2021 22 8 - 23 mg/dL Final   10/07/2021 22 8 - 23 mg/dL Final      Creatinine Creatinine   Date Value Ref Range Status   10/10/2021 0.59 (L) 0.76 - 1.27 mg/dL Final   10/09/2021 0.59 (L) 0.76 - 1.27 mg/dL Final   10/09/2021 0.62 (L) 0.76 - 1.27 mg/dL Final   10/08/2021 0.66 (L) 0.76 - 1.27 mg/dL Final   10/08/2021 0.62 (L) 0.76 - 1.27 mg/dL Final   10/08/2021 0.65 (L) 0.76 - 1.27 mg/dL Final   10/07/2021 0.66 (L) 0.76 - 1.27 mg/dL Final   10/07/2021 0.65 (L) 0.76 - 1.27 mg/dL Final      Calcium Calcium   Date Value Ref Range Status   10/10/2021 8.4 (L) 8.6 - 10.5 mg/dL Final   10/09/2021 8.5 (L) 8.6 - 10.5 mg/dL Final   10/08/2021 8.8 8.6 - 10.5 mg/dL Final   10/08/2021 8.6 8.6 - 10.5 mg/dL Final   10/08/2021 8.3 (L) 8.6 - 10.5 mg/dL Final   10/07/2021 8.3 (L) 8.6 - 10.5 mg/dL Final   10/07/2021 8.2 (L) 8.6 - 10.5 mg/dL Final      PO4 No components found for: PO4   Albumin Albumin   Date Value Ref Range Status   10/10/2021 2.70 (L) 3.50 - 5.20 g/dL Final   10/08/2021 2.50 (L) 3.50 - 5.20 g/dL Final      Magnesium Magnesium   Date Value Ref Range Status   10/10/2021 1.9 1.6 - 2.4 mg/dL Final   10/09/2021 2.2 1.6 - 2.4 mg/dL Final      Uric Acid  No components found for: URIC ACID     Imaging Results (Last 72 Hours)     Procedure Component Value Units Date/Time    CT Cervical Spine Without Contrast [[id number]] Collected: 10/09/21 1659     Updated: 10/09/21 1705    Narrative:      DATE OF EXAM:  10/9/2021 4:27 PM     PROCEDURE:  CT CERVICAL SPINE WO CONTRAST-     INDICATIONS:   Neck pain, acute, known malignancy; A41.9-Sepsis, unspecified organism;  L22-Diaper dermatitis; J18.9-Pneumonia, unspecified organism;  E87.0-Vote-ipiucvdlkk and hyponatremia; E87.6-Hypokalemia;  R53.1-Weakness; R91.8-Other nonspecific abnormal finding of lung field     COMPARISON:   No Comparisons Available     TECHNIQUE:  CT scan of the cervical spine without IV contrast. Automated exposure  control and iterative construction methods were used.      FINDINGS:  No acute fracture is identified. There are destructive changes involving  the right C1 lateral mass. There is minimal anterolisthesis of C4 on C5.  The vertebral body heights appear normal. There are moderate discogenic  changes at C5-C6 through T1-2. No high-grade spinal canal stenosis is  identified. There is uncovertebral hypertrophy and facet arthropathy at  several levels, resulting in up to severe neural foraminal stenosis on  the right at C3-4, on the right at C4-5, and bilaterally at C5-6. The  prevertebral soft tissues are unremarkable.       Impression:      1.Osseous destructive changes involving right C1 lateral mass,  concerning for metastatic disease.  2.Degenerative changes of cervical spine as described above.     Electronically Signed By-Bogdan Marquez MD On:10/9/2021 5:03 PM  This report was finalized on [id number] by  Bogdan Marquez MD.    CT Needle Biopsy Lung [[id number]] Collected: 10/07/21 1005     Updated: 10/07/21 1127    Narrative:      DATE OF EXAM:  10/7/2021 9:27 AM     PROCEDURE:  CT NEEDLE BIOPSY LUNG-     INDICATIONS:  Right lower lobe lung mass; A41.9-Sepsis, unspecified  organism;  L22-Diaper dermatitis; J18.9-Pneumonia, unspecified organism;  E87.5-Nidv-vmbuilcatc and hyponatremia; E87.6-Hypokalemia;  R53.1-Weakness; R91.8-Other nonspecific abnormal finding of lung field     COMPARISON:  No Comparisons Available     FLUOROSCOPIC TIME:  CT fluoroscopy time 2.61 seconds     PHYSICIAN MONITORED CONSCIOUS SEDATION TIME:  15 minutes     CONTRAST MEDIA:  None     TECHNIQUE:   The patient's medications were reviewed prior to the procedure. The  procedure, risks and alternatives were discussed and informed written  consent was obtained. Maximal sterile barrier technique was utilized  throughout the procedure. The patient was placed in the right lateral  decubitus position and preliminary images were obtained. An appropriate  site was chosen for biopsy posteriorly over the right lower chest wall.  The skin was marked prepped and draped. IV conscious sedation was  administered consisting of Versed and fentanyl. The patient was  monitored by the IR nurse during the entire procedure. Utilizing maximal  sterile barrier technique and under local anesthesia a 17-gauge guide  needle was advanced incrementally into this area. The needle was  aspirated and no purulent fluid could be obtained. Multiple 18-gauge  core specimens were then obtained. These were reviewed by the  pathologist and felt to represent reactive fibrous tissue. Multiple  areas were sampled. These were placed in formalin. The needle was  removed and hemostasis achieved.          Impression:      Technically successful biopsy of the pleural-based mass at the right  lung base. Initial preliminary interpretation suggest dense fibrous  tissue. Final path results are pending. Postprocedure chest radiograph  is pending.     Electronically Signed By-Aly Brandon MD On:10/7/2021 10:07 AM  This report was finalized on [id number] by  Aly Brandon MD.    XR Chest 1 View [[id number]] Collected: 10/07/21 1015     Updated: 10/07/21 1127     Narrative:      DATE OF EXAM:  10/7/2021 10:12 AM     PROCEDURE:  XR CHEST 1 VW-     INDICATIONS:  s/p right lung biopsy; A41.9-Sepsis, unspecified organism; L22-Diaper  dermatitis; J18.9-Pneumonia, unspecified organism; E87.3-Vbar-bzoizjjdrt  and hyponatremia; E87.6-Hypokalemia; R53.1-Weakness; R91.8-Other  nonspecific abnormal finding of lung field       COMPARISON:  AP portable chest 10/5/2021. CT-guided needle biopsy of right lung Mass.  10/7/2021 at 9:44 AM.     TECHNIQUE:   Single radiographic view of the chest was obtained.     FINDINGS:  No pneumothorax is seen. Right lower lobe convex masslike density at the  right lateral costophrenic angle is again noted. Small right pleural  effusion is seen. Convex opacity in the medial left base corresponds to  focal eventration of the left hemidiaphragm, which is seen to better  advantage on prior CT chest study. Interstitial thickening or fibrotic  changes are thought to be present in both lungs. Heart size is normal.  Degenerative endplate spurring in the thoracic spine. Moderately  advanced degenerative changes of both shoulders.                   Impression:      No visible pneumothorax status post right lower lobe lung mass biopsy  earlier today.     Electronically Signed By-Opal Perales MD On:10/7/2021 10:17 AM  This report was finalized on [id number] by  Opal Perales MD.    US Liver [[id number]] Collected: 10/07/21 0948     Updated: 10/07/21 0952    Narrative:      DATE OF EXAM:  10/7/2021 9:29 AM     PROCEDURE:  US LIVER-     INDICATIONS:  Hepatic transaminase derangement; A41.9-Sepsis, unspecified organism;  L22-Diaper dermatitis; J18.9-Pneumonia, unspecified organism;  E87.4-Hznq-cdkwztiaok and hyponatremia; E87.6-Hypokalemia;  R53.1-Weakness; R91.8-Other nonspecific abnormal finding of lung field     COMPARISON:  CT abdomen and pelvis 10/5/2021.     TECHNIQUE:   Grayscale and color Doppler ultrasound evaluation of the limited abdomen  was  performed.     FINDINGS:  The liver size is normal, 16.3 cm in length. The liver maintains a  normal echotexture. There are no focal liver lesions. The liver does not  appear grossly cirrhotic or steatotic. The main portal vein is patent.  Hepatic veins are patent. Common bile duct caliber is normal, 4 mm. No  intrahepatic biliary ductal dilation is identified.        Impression:      Normal hepatic ultrasound.     Electronically Signed By-Opal Perales MD On:10/7/2021 9:50 AM  This report was finalized on [id number] by  Opal Perales MD.          Results for orders placed during the hospital encounter of 10/05/21    XR Chest 1 View    Narrative  DATE OF EXAM:  10/7/2021 10:12 AM    PROCEDURE:  XR CHEST 1 VW-    INDICATIONS:  s/p right lung biopsy; A41.9-Sepsis, unspecified organism; L22-Diaper  dermatitis; J18.9-Pneumonia, unspecified organism; E87.0-Ygps-embmahpqds  and hyponatremia; E87.6-Hypokalemia; R53.1-Weakness; R91.8-Other  nonspecific abnormal finding of lung field    COMPARISON:  AP portable chest 10/5/2021. CT-guided needle biopsy of right lung Mass.  10/7/2021 at 9:44 AM.    TECHNIQUE:  Single radiographic view of the chest was obtained.    FINDINGS:  No pneumothorax is seen. Right lower lobe convex masslike density at the  right lateral costophrenic angle is again noted. Small right pleural  effusion is seen. Convex opacity in the medial left base corresponds to  focal eventration of the left hemidiaphragm, which is seen to better  advantage on prior CT chest study. Interstitial thickening or fibrotic  changes are thought to be present in both lungs. Heart size is normal.  Degenerative endplate spurring in the thoracic spine. Moderately  advanced degenerative changes of both shoulders.    Impression  No visible pneumothorax status post right lower lobe lung mass biopsy  earlier today.    Electronically Signed By-Opal Perales MD On:10/7/2021 10:17 AM  This report was finalized on [id number]  by  Opal Perales MD.      XR Chest 1 View    Narrative  Examination: XR CHEST 1 VW-    Date of Exam: 10/5/2021 8:10 PM    Indication: Weakness.    Comparison: 05/20/2015    Technique: 1 view of the chest    FINDINGS:  The heart size is within normal. There is a lobulated opacity in the  inferior lateral right chest that measures about 5.5 cm; the lower  margin of this is obscured. There is some adjacent pleural thickening or  fluid in the inferior lateral right chest. These findings have developed  since the 2015 exam. There may be some minor patchy airspace opacity in  the lateral aspect of the left midlung. There is aortic calcification.  No significant bone abnormality.    Impression  1. There is an abnormality in the right lower chest. Part of this has a  masslike configuration. It may be due to parenchymal parenchymal  disease, pleural disease, or both. A lung mass is possible. Further  evaluation with chest CT with contrast is recommended.  2. Question some minor opacity in the lateral left midlung which may be  due to pneumonia.    Electronically Signed By-Margarita Cooper MD On:10/5/2021 9:06 PM  This report was finalized on [id number] by  Margarita Cooper MD.      Results for orders placed in visit on 04/14/21    SCANNED - IMAGING            ASSESSMENT / PLAN      Sepsis (HCC)    1. HYPONATREMIA/SIADH-------Stable and in safe range s/p Samsca. Follow with po fluid restriction alone at 1200 cc/day. No neuro sx. Off thiazides    2. PNA/RIGHT LUNG MASS------per Pulmonary and Hem/Onc    3. BENIGN ESSENTIAL HTN-----BP okay. BP meds on hold    4. OA/DJD    5. HYPERLIPIDEMIA------Hold Statin given elevated LFTs    6. ANEMIA------IV iron for LASHAY    7. DVT PROPHYLAXIS-----Lovenox    8. HYPOKALEMIA-----Replaced    9. HYPOMAGNESEMIA-----Replaced    10. ELEVATED LFTS/TRANSAMINASES    11. HYPOPHOSPHATEMIA------Replace po    Ivis Barnes MD  [facility]  [phone]  10/10/21  09:38 EDT

## 2021-10-10 NOTE — THERAPY TREATMENT NOTE
Subjective: Pt agreeable to therapeutic plan of care. PT didn't want to stay in chair very long and wanted back to bed.    Objective:     Bed mobility - Min-A  Transfers - Min-A with UE support and extra time.  Ambulation -  feet N/A or Not attempted.    Pain: severe neck and top of head pain  Education: Provided education on importance of mobility and skilled verbal / tactile cueing throughout intervention.     Assessment: Prabhjot Roldan presents with functional mobility impairments which indicate the need for skilled intervention. Tolerating session today without incident. Patient did not tolerate much activity due to pain. 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 On 2L O2, sats 86>91%, HR 90>122. Plans are for rehab at [facility].Will continue to follow and progress as tolerated.     Plan/Recommendations:   Pt would benefit from Inpatient Rehabilitation placement at discharge from facility and requires no DME at discharge.   Pt desires Inpatient Rehabilitation placement at discharge. Pt cooperative; agreeable to therapeutic recommendations and plan of care.     Basic Mobility 6-click:  Rollin = Total, A lot = 2, A little = 3; 4 = None  Supine>Sit:   1 = Total, A lot = 2, A little = 3; 4 = None   Sit>Stand with arms:  1 = Total, A lot = 2, A little = 3; 4 = None  Bed>Chair:   1 = Total, A lot = 2, A little = 3; 4 = None  Ambulate in room:  1 = Total, A lot = 2, A little = 3; 4 = None  3-5 Steps with railin = Total, A lot = 2, A little = 3; 4 = None  Score: 14      Post-Tx Position: Supine with HOB Elevated, Alarms activated and Call light and personal items within reach  PPE: gloves, surgical mask, eyewear protection

## 2021-10-11 NOTE — PROGRESS NOTES
"NEPHROLOGY PROGRESS NOTE------[facility]    [facility]  [phone]  Uche Mojica MD      Patient Care Team:  Herve Baez MD as PCP - General (Family Medicine)  Uche Mojica MD as Consulting Physician (Nephrology)  Estefania Sanchez MD as Consulting Physician (Hematology and Oncology)      Provider:  Uche Mojica MD  Patient Name: Prabhjot Roldan  :  1935    SUBJECTIVE:  F/U Hyponatremia  No chest pain or SOA  Feels better  Appetite poor    Medication:  acetylcysteine, 2 mL, Nebulization, BID - RT  Acetylcysteine, 600 mg, Oral, BID  aspirin, 81 mg, Oral, Daily  budesonide, 0.5 mg, Nebulization, BID - RT  fentaNYL, 1 patch, Transdermal, Q72H   And  Check Fentanyl Patch Placement, 1 each, Does not apply, Q12H  dilTIAZem CD, 180 mg, Oral, Q24H  docusate sodium, 100 mg, Oral, BID  enoxaparin, 40 mg, Subcutaneous, Daily  ipratropium-albuterol, 3 mL, Nebulization, 4x Daily - RT  methylPREDNISolone sodium succinate, 60 mg, Intravenous, Q8H           OBJECTIVE    Vital Sign Min/Max for last 24 hours  Temp  Min: 97.8 °F (36.6 °C)  Max: 99.3 °F (37.4 °C)   BP  Min: 83/43  Max: 116/65   Pulse  Min: 93  Max: 119   Resp  Min: 15  Max: 35   SpO2  Min: 89 %  Max: 100 %   No data recorded   No data recorded     Flowsheet Rows      First Filed Value   Admission Height  175.3 cm (69\") Documented at 10/05/2021 1928   Admission Weight  86.2 kg (190 lb) Documented at 10/05/2021 1928          No intake/output data recorded.  I/O last 3 completed shifts:  In: 360 [P.O.:360]  Out: 1625 [Urine:1625]    Physical Exam:  General Appearance: alert, appears stated age and cooperative  Head: normocephalic, without obvious abnormality and atraumatic  Eyes: conjunctivae and sclerae normal and no icterus  Neck: supple and no JVD  Lungs: clear to auscultation and respirations regular  Heart: regular rhythm & normal rate and normal S1, S2  Chest: Wall no " abnormalities observed  Abdomen: normal bowel sounds and soft non-tender  Extremities: moves extremities well, no edema, no cyanosis and no redness  Skin: no bleeding, bruising or rash, turgor normal, color normal and no lesions noted  Neurologic: Alert, and oriented. No focal deficits    Labs:    WBC WBC   Date Value Ref Range Status   10/11/2021 17.10 (H) 3.40 - 10.80 10*3/mm3 Final   10/10/2021 10.10 3.40 - 10.80 10*3/mm3 Final      HGB Hemoglobin   Date Value Ref Range Status   10/11/2021 8.3 (L) 13.0 - 17.7 g/dL Final   10/10/2021 9.2 (L) 13.0 - 17.7 g/dL Final      HCT Hematocrit   Date Value Ref Range Status   10/11/2021 24.3 (L) 37.5 - 51.0 % Final   10/10/2021 26.7 (L) 37.5 - 51.0 % Final      Platlets No results found for: LABPLAT   MCV MCV   Date Value Ref Range Status   10/11/2021 88.4 79.0 - 97.0 fL Final   10/10/2021 88.6 79.0 - 97.0 fL Final          Sodium Sodium   Date Value Ref Range Status   10/11/2021 129 (L) 136 - 145 mmol/L Final   10/10/2021 130 (L) 136 - 145 mmol/L Final   10/09/2021 130 (L) 136 - 145 mmol/L Final   10/08/2021 127 (L) 136 - 145 mmol/L Final      Potassium Potassium   Date Value Ref Range Status   10/11/2021 3.6 3.5 - 5.2 mmol/L Final   10/10/2021 3.5 3.5 - 5.2 mmol/L Final   10/09/2021 3.5 3.5 - 5.2 mmol/L Final   10/08/2021 3.6 3.5 - 5.2 mmol/L Final     Comment:     Slight hemolysis detected by analyzer. Results may be affected.      Chloride Chloride   Date Value Ref Range Status   10/11/2021 88 (L) 98 - 107 mmol/L Final   10/10/2021 91 (L) 98 - 107 mmol/L Final   10/09/2021 92 (L) 98 - 107 mmol/L Final   10/08/2021 87 (L) 98 - 107 mmol/L Final      CO2 CO2   Date Value Ref Range Status   10/11/2021 27.0 22.0 - 29.0 mmol/L Final   10/10/2021 30.0 (H) 22.0 - 29.0 mmol/L Final   10/09/2021 30.0 (H) 22.0 - 29.0 mmol/L Final   10/08/2021 26.0 22.0 - 29.0 mmol/L Final      BUN BUN   Date Value Ref Range Status   10/11/2021 16 8 - 23 mg/dL Final   10/10/2021 15 8 - 23 mg/dL  Final   10/09/2021 18 8 - 23 mg/dL Final   10/08/2021 21 8 - 23 mg/dL Final      Creatinine Creatinine   Date Value Ref Range Status   10/11/2021 0.62 (L) 0.76 - 1.27 mg/dL Final   10/10/2021 0.59 (L) 0.76 - 1.27 mg/dL Final   10/09/2021 0.59 (L) 0.76 - 1.27 mg/dL Final   10/09/2021 0.62 (L) 0.76 - 1.27 mg/dL Final   10/08/2021 0.66 (L) 0.76 - 1.27 mg/dL Final      Calcium Calcium   Date Value Ref Range Status   10/11/2021 8.5 (L) 8.6 - 10.5 mg/dL Final   10/10/2021 8.4 (L) 8.6 - 10.5 mg/dL Final   10/09/2021 8.5 (L) 8.6 - 10.5 mg/dL Final   10/08/2021 8.8 8.6 - 10.5 mg/dL Final      PO4 No components found for: PO4   Albumin Albumin   Date Value Ref Range Status   10/10/2021 2.70 (L) 3.50 - 5.20 g/dL Final      Magnesium Magnesium   Date Value Ref Range Status   10/11/2021 1.8 1.6 - 2.4 mg/dL Final   10/10/2021 1.9 1.6 - 2.4 mg/dL Final   10/09/2021 2.2 1.6 - 2.4 mg/dL Final      Uric Acid No components found for: URIC ACID     Imaging Results (Last 72 Hours)     Procedure Component Value Units Date/Time    CT Cervical Spine Without Contrast [[id number]] Collected: 10/09/21 1659     Updated: 10/09/21 1705    Narrative:      DATE OF EXAM:  10/9/2021 4:27 PM     PROCEDURE:  CT CERVICAL SPINE WO CONTRAST-     INDICATIONS:   Neck pain, acute, known malignancy; A41.9-Sepsis, unspecified organism;  L22-Diaper dermatitis; J18.9-Pneumonia, unspecified organism;  E87.5-Wmjz-ipzdbudskt and hyponatremia; E87.6-Hypokalemia;  R53.1-Weakness; R91.8-Other nonspecific abnormal finding of lung field     COMPARISON:   No Comparisons Available     TECHNIQUE:  CT scan of the cervical spine without IV contrast. Automated exposure  control and iterative construction methods were used.      FINDINGS:  No acute fracture is identified. There are destructive changes involving  the right C1 lateral mass. There is minimal anterolisthesis of C4 on C5.  The vertebral body heights appear normal. There are moderate discogenic  changes at C5-C6  through T1-2. No high-grade spinal canal stenosis is  identified. There is uncovertebral hypertrophy and facet arthropathy at  several levels, resulting in up to severe neural foraminal stenosis on  the right at C3-4, on the right at C4-5, and bilaterally at C5-6. The  prevertebral soft tissues are unremarkable.       Impression:      1.Osseous destructive changes involving right C1 lateral mass,  concerning for metastatic disease.  2.Degenerative changes of cervical spine as described above.     Electronically Signed By-Bogdan Marquez MD On:10/9/2021 5:03 PM  This report was finalized on [id number] by  Bogdan Marquez MD.          Results for orders placed during the hospital encounter of 10/05/21    XR Chest 1 View    Narrative  DATE OF EXAM:  10/7/2021 10:12 AM    PROCEDURE:  XR CHEST 1 VW-    INDICATIONS:  s/p right lung biopsy; A41.9-Sepsis, unspecified organism; L22-Diaper  dermatitis; J18.9-Pneumonia, unspecified organism; E87.7-Rclv-mgfwluhola  and hyponatremia; E87.6-Hypokalemia; R53.1-Weakness; R91.8-Other  nonspecific abnormal finding of lung field    COMPARISON:  AP portable chest 10/5/2021. CT-guided needle biopsy of right lung Mass.  10/7/2021 at 9:44 AM.    TECHNIQUE:  Single radiographic view of the chest was obtained.    FINDINGS:  No pneumothorax is seen. Right lower lobe convex masslike density at the  right lateral costophrenic angle is again noted. Small right pleural  effusion is seen. Convex opacity in the medial left base corresponds to  focal eventration of the left hemidiaphragm, which is seen to better  advantage on prior CT chest study. Interstitial thickening or fibrotic  changes are thought to be present in both lungs. Heart size is normal.  Degenerative endplate spurring in the thoracic spine. Moderately  advanced degenerative changes of both shoulders.    Impression  No visible pneumothorax status post right lower lobe lung mass biopsy  earlier today.    Electronically Signed  By-Opal Perales MD On:10/7/2021 10:17 AM  This report was finalized on [id number] by  Opal Perales MD.      XR Chest 1 View    Narrative  Examination: XR CHEST 1 VW-    Date of Exam: 10/5/2021 8:10 PM    Indication: Weakness.    Comparison: 05/20/2015    Technique: 1 view of the chest    FINDINGS:  The heart size is within normal. There is a lobulated opacity in the  inferior lateral right chest that measures about 5.5 cm; the lower  margin of this is obscured. There is some adjacent pleural thickening or  fluid in the inferior lateral right chest. These findings have developed  since the 2015 exam. There may be some minor patchy airspace opacity in  the lateral aspect of the left midlung. There is aortic calcification.  No significant bone abnormality.    Impression  1. There is an abnormality in the right lower chest. Part of this has a  masslike configuration. It may be due to parenchymal parenchymal  disease, pleural disease, or both. A lung mass is possible. Further  evaluation with chest CT with contrast is recommended.  2. Question some minor opacity in the lateral left midlung which may be  due to pneumonia.    Electronically Signed By-Margarita Cooper MD On:10/5/2021 9:06 PM  This report was finalized on [id number] by  Margarita Cooper MD.      Results for orders placed in visit on 04/14/21    SCANNED - IMAGING            ASSESSMENT / PLAN      Sepsis (HCC)    ·  Hypokalemia/hypomagnesemia-likley related to thiazides. we will replace.   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 Urine osm > 500, Jess 27. exacerbated on thiazides. TSH is normal.  No paraproteins   · pneumonia  · Hypertension-his blood pressure was low admission hold meds.    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

## 2021-10-11 NOTE — CONSULTS
Referring Provider:Estefania Sanchez MD  Reason for Consultation: C1 mass    Patient Care Team:  Herve Baez MD as PCP - General (Family Medicine)  Uche Mojica MD as Consulting Physician (Nephrology)  Estefania Sanchez MD as Consulting Physician (Hematology and Oncology)    Chief complaint Neck pain    Subjective .     History of present illness:  Prabhjot Roldan is a 86 y.o. male who presented with lethargy, hypotension and pneumonia.  His daughter reports that she had gone over to visit her father and found him unable to respond to her.  Imaging of his chest demonstrated a right lung mass in the lower lobe.  Subsequent biopsy resulted with diffuse squamous cell carcinoma.  Patient also complaining of neck pain and additional imaging of the neck demonstrated destruction related to mass at C1 on the right.  Neurosurgery was consulted for further assessment/evaluation and recommendations.  Patient's daughters are at bedside providing the majority of the information as patient has had increased O2 demand with tachycardia.  He does seem short of air when he tries to respond.  Family states that in November of last year patient began having neck pain and headaches.  His neck pain did radiate down into his right shoulder that affected him more when he moved.  He also was using towels to prop his head up due to the neck pain on the right side.  He underwent physical therapy and SYLVIE's which provided him temporary relief.  He has of late also experienced some difficulty with his balance but has sustained no falls.  Patient is reportedly independent and very active at baseline.  He does deny any visual changes, acute hearing loss, falls, extremity weakness.    Review of Systems  Review of Systems   Musculoskeletal: Positive for gait problem, neck pain and neck stiffness.   Neurological: Positive for headaches.   Psychiatric/Behavioral: Positive for confusion.   All other systems reviewed and are  negative.      History  Past Medical History:   Diagnosis Date   • Arthritis    • Hyperlipidemia    • Hypertension    • Neck pain      Past Surgical History:   Procedure Laterality Date   • HERNIA REPAIR       Family History   Problem Relation Age of Onset   • Cancer Mother    • Diabetes Mother    • Cancer Father    • Prostate cancer Father    • Heart attack Father      Social History     Tobacco Use   • Smoking status: Never Smoker   • Smokeless tobacco: Never Used   Vaping Use   • Vaping Use: Never used   Substance Use Topics   • Alcohol use: Never   • Drug use: Defer     Medications Prior to Admission   Medication Sig Dispense Refill Last Dose   • aspirin (Adult Aspirin EC Low Strength) 81 MG EC tablet Take 81 mg by mouth Daily.      • [] HYDROcodone-acetaminophen (NORCO) 5-325 MG per tablet Take 1 tablet by mouth Daily As Needed for Severe Pain  for up to 7 days. 7 tablet 0    • tiZANidine (ZANAFLEX) 4 MG tablet Take 1 tablet by mouth At Night As Needed for Muscle Spasms for up to 30 days. 30 tablet 0    • amLODIPine (NORVASC) 10 MG tablet       • dilTIAZem CD (CARDIZEM CD) 240 MG 24 hr capsule       • hydrALAZINE (APRESOLINE) 10 MG tablet       • hydroCHLOROthiazide (HYDRODIURIL) 25 MG tablet       • pravastatin (PRAVACHOL) 80 MG tablet         Patient has no known allergies.    Scheduled Meds:acetylcysteine, 2 mL, Nebulization, BID - RT  Acetylcysteine, 600 mg, Oral, BID  aspirin, 81 mg, Oral, Daily  budesonide, 0.5 mg, Nebulization, BID - RT  fentaNYL, 1 patch, Transdermal, Q72H   And  Check Fentanyl Patch Placement, 1 each, Does not apply, Q12H  dilTIAZem CD, 180 mg, Oral, Q24H  docusate sodium, 100 mg, Oral, BID  enoxaparin, 40 mg, Subcutaneous, Daily  ipratropium-albuterol, 3 mL, Nebulization, 4x Daily - RT  methylPREDNISolone sodium succinate, 60 mg, Intravenous, Q8H      Continuous Infusions:   PRN Meds:.bisacodyl  •  HYDROcodone-acetaminophen  •  magnesium hydroxide  •  magnesium sulfate **OR**  magnesium sulfate **OR** magnesium sulfate  •  potassium chloride **OR** potassium chloride **OR** potassium chloride  •  [COMPLETED] Insert peripheral IV **AND** sodium chloride  •  tiZANidine    Objective     Vital Signs   Vitals:    10/11/21 0709 10/11/21 0849 10/11/21 0900 10/11/21 1027   BP:    116/65   BP Location:    Left arm   Patient Position:    Lying   Pulse: 113 119 118 106   Resp: (!) 35   20   Temp:    98.8 °F (37.1 °C)   TempSrc:    Axillary   SpO2: 94% (!) 89% 93% 95%   Weight:       Height:           Imaging:   DATE OF EXAM:  10/9/2021 4:27 PM     PROCEDURE:  CT CERVICAL SPINE WO CONTRAST-     INDICATIONS:   Neck pain, acute, known malignancy; A41.9-Sepsis, unspecified organism;  L22-Diaper dermatitis; J18.9-Pneumonia, unspecified organism;  E87.7-Jrup-vcesvgaahe and hyponatremia; E87.6-Hypokalemia;  R53.1-Weakness; R91.8-Other nonspecific abnormal finding of lung field     COMPARISON:   No Comparisons Available     TECHNIQUE:  CT scan of the cervical spine without IV contrast. Automated exposure  control and iterative construction methods were used.      FINDINGS:  No acute fracture is identified. There are destructive changes involving  the right C1 lateral mass. There is minimal anterolisthesis of C4 on C5.  The vertebral body heights appear normal. There are moderate discogenic  changes at C5-C6 through T1-2. No high-grade spinal canal stenosis is  identified. There is uncovertebral hypertrophy and facet arthropathy at  several levels, resulting in up to severe neural foraminal stenosis on  the right at C3-4, on the right at C4-5, and bilaterally at C5-6. The  prevertebral soft tissues are unremarkable.     IMPRESSION:  1.Osseous destructive changes involving right C1 lateral mass,  concerning for metastatic disease.  2.Degenerative changes of cervical spine as described above.     Electronically Signed By-Bogdan Marquez MD On:10/9/2021 5:03 PM  This report was finalized on [id number] by   Bogdan Marquez MD.      Physical Exam:     Physical Exam  Eyes:      Pupils: Pupils are equal, round, and reactive to light.   Neurological:      Mental Status: He is oriented to person, place, and time.      Deep Tendon Reflexes:      Reflex Scores:       Bicep reflexes are 0 on the right side and 0 on the left side.       Brachioradialis reflexes are 0 on the right side and 1+ on the left side.  Psychiatric:         Speech: Speech normal.       Neurologic Exam     Mental Status   Oriented to person, place, and time.   Speech: speech is normal   Level of consciousness: alert    Cranial Nerves     CN III, IV, VI   Pupils are equal, round, and reactive to light.    Motor Exam     Strength   Strength 5/5 except as noted.   Left deltoid: 4/5    Sensory Exam   Light touch normal.     Gait, Coordination, and Reflexes     Reflexes   Right brachioradialis: 0  Left brachioradialis: 1+  Right biceps: 0  Left biceps: 0    Diminished range of motion neck to right    Results Review:  Lab Results (last 24 hours)     Procedure Component Value Units Date/Time    Basic Metabolic Panel [[id number]]  (Abnormal) Collected: 10/11/21 0325    Specimen: Blood Updated: 10/11/21 0531     Glucose 114 mg/dL      BUN 16 mg/dL      Creatinine 0.62 mg/dL      Sodium 129 mmol/L      Potassium 3.6 mmol/L      Chloride 88 mmol/L      CO2 27.0 mmol/L      Calcium 8.5 mg/dL      eGFR Non African Amer 124 mL/min/1.73      BUN/Creatinine Ratio 25.8     Anion Gap 14.0 mmol/L     Narrative:      GFR Normal >60  Chronic Kidney Disease <60  Kidney Failure <15      CK [[id number]]  (Normal) Collected: 10/11/21 0325    Specimen: Blood Updated: 10/11/21 0531     Creatine Kinase 66 U/L     Phosphorus [[id number]]  (Abnormal) Collected: 10/11/21 0325    Specimen: Blood Updated: 10/11/21 0531     Phosphorus 2.1 mg/dL     Magnesium [[id number]]  (Normal) Collected: 10/11/21 0325    Specimen: Blood Updated: 10/11/21 0531     Magnesium 1.8 mg/dL     Blood Gas,  Arterial - [[id number]]  (Abnormal) Collected: 10/11/21 0515    Specimen: Arterial Blood Updated: 10/11/21 0518     Site Right Radial     Al's Test Positive     pH, Arterial 7.462 pH units      pCO2, Arterial 36.4 mm Hg      pO2, Arterial 86.1 mm Hg      HCO3, Arterial 26.0 mmol/L      Base Excess, Arterial 2.1 mmol/L      Comment: Serial Number: [id number]:  [id number]        O2 Saturation, Arterial 97.1 %      CO2 Content 27.1 mmol/L      Barometric Pressure for Blood Gas --     Comment: N/A        Modality NRB     FIO2 100 %      Hemodilution No    CBC (No Diff) [[id number]]  (Abnormal) Collected: 10/11/21 0325    Specimen: Blood Updated: 10/11/21 0457     WBC 17.10 10*3/mm3      RBC 2.74 10*6/mm3      Hemoglobin 8.3 g/dL      Hematocrit 24.3 %      MCV 88.4 fL      MCH 30.2 pg      MCHC 34.1 g/dL      RDW 14.8 %      RDW-SD 45.5 fl      MPV 7.2 fL      Platelets 439 10*3/mm3     Blood Culture - Blood, Arm, Right [[id number]]  (Normal) Collected: 10/05/21 2001    Specimen: Blood from Arm, Right Updated: 10/10/21 2015     Blood Culture No growth at 5 days    Narrative:      The previously reported component GRAM STAIN is no longer being reported. Previous result was <null> (Reference Range: [Reference Range]) on 10/8/2021 at 2015 EDT.    Blood Culture - Blood, Arm, Left [[id number]]  (Normal) Collected: 10/05/21 1951    Specimen: Blood from Arm, Left Updated: 10/10/21 2015     Blood Culture No growth at 5 days    Narrative:      The previously reported component GRAM STAIN is no longer being reported. Previous result was <null> (Reference Range: [Reference Range]) on 10/8/2021 at 2015 EDT.          Imaging Results (Last 24 Hours)     ** No results found for the last 24 hours. **            Assessment/Plan       Sepsis (HCC)          ASSESSMENT: Mr. Roldan is an 86-year-old male that presented to emergency department last week due to lethargy and altered mental status.  Patient work-up demonstrated right lower lobe  mass as well as C1 destructive lesion on the right.  Neurosurgery was consulted for recommendations.  Patient was able to move all extremities and had a good  bilaterally.  He did have some slight weakness in his left deltoid but I feel this may have been due to poor effort.  Patient was very sleepy as he reports he did not get a lot of sleep last night.  He has had increased oxygen demands with some tachycardia so he was not fully compliant in assessment maneuvers.  CT imaging again demonstrated probable metastatic lesion right lateral mass of C1.  Pathology did confirm diffuse squamous cell carcinoma of the lung.  There is significant foraminal stenosis at several levels from C3-C6.  This is mainly noted at C4-C5 where there is significant bilateral foraminal narrowing as well as moderate amount of central narrowing felt to be seen.  Patient did have MRI without contrast in April of this year and this has been reviewed in priority radiology.  No mention of lateral mass of the C1 by report but imaging was without contrast.  He does have significant stenosis at multi levels.  Due to new finding of lateral mass, patient will need further imaging including MRI with and without contrast for further assessment/evaluation of stenosis as well as for probable radiation therapy.  We would recommend patient be placed in a cervical collar at this time for stability.  We will follow up after imaging for any further recommendations.    PLAN:     MRI with and without contrast cervical spine      This patient was seen and examined using appropriate personal protective equipment.    I discussed the patient's findings and recommendations with patient, family, and Dr. Hilliard.    Yamileth Quintana, APRN  10/11/21  11:23 EDT

## 2021-10-11 NOTE — PLAN OF CARE
Goal Outcome Evaluation:        02 demand this morning with tachycardia.Pt working harder to breath placed a Call Dr Ochoa. Received orders from [facility] stat ABG C02 high place on Bipap. C02 normal PH 7.46. Pt placed on high flow humidified  NC 10L. Pt at 100% 02 will continue to [facility].

## 2021-10-11 NOTE — PROGRESS NOTES
Hematology/Oncology Inpatient Progress Note    PATIENT NAME: Prabhjot Roldan  : 1935  MRN: [id number]    CHIEF COMPLAINT: Lethargy and altered mental status    HISTORY OF PRESENT ILLNESS:    86 y.o. male presented to [facility] on 10/5/2021 with a complaint of altered mental status.  He was noted to be lethargic, hypotensive upon presentation.  Electrolyte abnormalities and community-acquired pneumonia is suspected and he was admitted.  Incidentally CT of the chest showed right lung mass lower lobe.     10/06/21  Hematology/Oncology was consulted for right lung mass.  His daughter was there.  Is having diminished appetite.  He is not able to eat due to lack of dentures since he  left home.I met with his daughter and she told this neck pain and he has been seeing pain specialist. He had MRI of cervical spine and she had stenosis in C4 and C5. He has received injection in the neck with pain relief. He has been following with primary care for intermittent rectal bleeding     Past Medical History:  1 hypertension  2.  Hyperlipidemia  3.  Arthritis    Surgical History:  Hernia repair  Social History:  He was ex-smoker.  He quit smoking 40 years ago.  There is no history of alcohol abuse.    Family History:  1.  Mother had carcinoma.  She also suffered from diabetes mellitus.  2.  Father had prostate carcinoma.  He  from myocardial infarction.    Allergies:  No known allergies.    INTERVAL HISTORY:  • 10/7/2021 CT-guided lung biopsy was done.  • 10/8/2021 path report is pending.  WBC 13, hemoglobin 11.1, platelet count 300,000.   (1-40)  (1-41) total bilirubin 0.9 (0-1.2)  • 10/9/2021 right lung biopsy showed invasive squamous cell carcinoma, CK 5/6+, TTF-1 negative  • 10/10/2021 CT cervical spine showed C1 lytic lesion.  • 10/11/2021 WBC 17.10 hemoglobin 8.3, platelet count of 4 39,000, alkaline phosphatase 68      Subjective   Patient continues to feel fatigued and sleeps  most of the time    ROS:  Review of Systems   Constitutional: Positive for activity change and appetite change. Negative for chills, fatigue, fever and unexpected weight change.   HENT: Negative for congestion, dental problem, hearing loss, mouth sores, nosebleeds, sore throat and trouble swallowing.    Eyes: Negative for photophobia and visual disturbance.   Respiratory: Negative for cough and shortness of breath.    Cardiovascular: Negative for chest pain, palpitations and leg swelling.   Gastrointestinal: Negative for abdominal distention, abdominal pain, blood in stool, constipation, diarrhea, nausea and vomiting.   Endocrine: Negative for cold intolerance and heat intolerance.   Genitourinary: Negative for decreased urine volume, difficulty urinating, frequency, hematuria and urgency.   Musculoskeletal: Negative for arthralgias, back pain and gait problem.   Skin: Negative for rash and wound.   Neurological: Negative for dizziness, tremors, weakness, light-headedness, numbness and headaches.   Hematological: Negative for adenopathy. Does not bruise/bleed easily.   Psychiatric/Behavioral: Positive for confusion. Negative for hallucinations. The patient is not nervous/anxious.    All other systems reviewed and are negative.       MEDICATIONS:    Scheduled Meds:  acetylcysteine, 2 mL, Nebulization, Q6H - RT  Acetylcysteine, 600 mg, Oral, BID  aspirin, 81 mg, Oral, Daily  budesonide, 0.5 mg, Nebulization, BID - RT  fentaNYL, 1 patch, Transdermal, Q72H   And  Check Fentanyl Patch Placement, 1 each, Does not apply, Q12H  dilTIAZem CD, 180 mg, Oral, Q24H  docusate sodium, 100 mg, Oral, BID  enoxaparin, 40 mg, Subcutaneous, Daily  ipratropium-albuterol, 3 mL, Nebulization, 4x Daily - RT  methylPREDNISolone sodium succinate, 60 mg, Intravenous, Q8H       Continuous Infusions:      PRN Meds:  bisacodyl  •  HYDROcodone-acetaminophen  •  magnesium hydroxide  •  magnesium sulfate **OR** magnesium sulfate **OR** magnesium  "sulfate  •  potassium chloride **OR** potassium chloride **OR** potassium chloride  •  [COMPLETED] Insert peripheral IV **AND** sodium chloride  •  tiZANidine     ALLERGIES:  No Known Allergies    Objective    VITALS:   /60 (BP Location: Left arm, Patient Position: Lying)   Pulse 119   Temp 99.3 °F (37.4 °C) (Oral)   Resp (!) 35   Ht 175.3 cm (69\")   Wt 84.1 kg (185 lb 4.8 oz)   SpO2 (!) 89%   BMI 27.36 kg/m²     PHYSICAL EXAM:  Physical Exam  Vitals and nursing note reviewed.   Constitutional:       General: He is not in acute distress.     Appearance: Normal appearance. He is well-developed. He is not diaphoretic.   HENT:      Head: Normocephalic and atraumatic.      Nose: Nose normal.      Mouth/Throat:      Mouth: Mucous membranes are moist.      Pharynx: Oropharynx is clear. No oropharyngeal exudate or posterior oropharyngeal erythema.   Eyes:      General: No scleral icterus.     Extraocular Movements: Extraocular movements intact.      Conjunctiva/sclera: Conjunctivae normal.      Pupils: Pupils are equal, round, and reactive to light.   Cardiovascular:      Rate and Rhythm: Normal rate and regular rhythm.      Heart sounds: Normal heart sounds. No murmur heard.      Pulmonary:      Effort: Pulmonary effort is normal. No respiratory distress.      Breath sounds: No wheezing or rales.      Comments: There is a diminished breath sounds in the right infrascapular area  Chest:   Breasts:      Right: No supraclavicular adenopathy.      Left: No supraclavicular adenopathy.       Abdominal:      General: Bowel sounds are normal. There is no distension.      Palpations: Abdomen is soft. There is no mass.      Tenderness: There is no abdominal tenderness. There is no guarding.   Genitourinary:     Comments: Deferred   Musculoskeletal:         General: No swelling, tenderness or deformity. Normal range of motion.      Cervical back: Normal range of motion and neck supple.      Right lower leg: No edema.    "   Left lower leg: No edema.   Lymphadenopathy:      Cervical: No cervical adenopathy.      Upper Body:      Right upper body: No supraclavicular adenopathy.      Left upper body: No supraclavicular adenopathy.   Skin:     General: Skin is warm and dry.      Coloration: Skin is not pale.      Findings: No bruising, erythema or rash.   Neurological:      General: No focal deficit present.      Mental Status: He is alert and oriented to person, place, and time.      Coordination: Coordination normal.   Psychiatric:         Mood and Affect: Mood normal.         Behavior: Behavior normal.         Thought Content: Thought content normal.           RECENT LABS:  Lab Results (last 24 hours)     Procedure Component Value Units Date/Time    Basic Metabolic Panel [[id number]]  (Abnormal) Collected: 10/11/21 0325    Specimen: Blood Updated: 10/11/21 0531     Glucose 114 mg/dL      BUN 16 mg/dL      Creatinine 0.62 mg/dL      Sodium 129 mmol/L      Potassium 3.6 mmol/L      Chloride 88 mmol/L      CO2 27.0 mmol/L      Calcium 8.5 mg/dL      eGFR Non African Amer 124 mL/min/1.73      BUN/Creatinine Ratio 25.8     Anion Gap 14.0 mmol/L     Narrative:      GFR Normal >60  Chronic Kidney Disease <60  Kidney Failure <15      CK [[id number]]  (Normal) Collected: 10/11/21 0325    Specimen: Blood Updated: 10/11/21 0531     Creatine Kinase 66 U/L     Phosphorus [[id number]]  (Abnormal) Collected: 10/11/21 0325    Specimen: Blood Updated: 10/11/21 0531     Phosphorus 2.1 mg/dL     Magnesium [[id number]]  (Normal) Collected: 10/11/21 0325    Specimen: Blood Updated: 10/11/21 0531     Magnesium 1.8 mg/dL     Blood Gas, Arterial - [[id number]]  (Abnormal) Collected: 10/11/21 0515    Specimen: Arterial Blood Updated: 10/11/21 0518     Site Right Radial     Al's Test Positive     pH, Arterial 7.462 pH units      pCO2, Arterial 36.4 mm Hg      pO2, Arterial 86.1 mm Hg      HCO3, Arterial 26.0 mmol/L      Base Excess, Arterial 2.1 mmol/L       Comment: Serial Number: [id number]:  [id number]        O2 Saturation, Arterial 97.1 %      CO2 Content 27.1 mmol/L      Barometric Pressure for Blood Gas --     Comment: N/A        Modality NRB     FIO2 100 %      Hemodilution No    CBC (No Diff) [[id number]]  (Abnormal) Collected: 10/11/21 0325    Specimen: Blood Updated: 10/11/21 0457     WBC 17.10 10*3/mm3      RBC 2.74 10*6/mm3      Hemoglobin 8.3 g/dL      Hematocrit 24.3 %      MCV 88.4 fL      MCH 30.2 pg      MCHC 34.1 g/dL      RDW 14.8 %      RDW-SD 45.5 fl      MPV 7.2 fL      Platelets 439 10*3/mm3     Blood Culture - Blood, Arm, Right [[id number]]  (Normal) Collected: 10/05/21 2001    Specimen: Blood from Arm, Right Updated: 10/10/21 2015     Blood Culture No growth at 5 days    Narrative:      The previously reported component GRAM STAIN is no longer being reported. Previous result was <null> (Reference Range: [Reference Range]) on 10/8/2021 at 2015 EDT.    Blood Culture - Blood, Arm, Left [[id number]]  (Normal) Collected: 10/05/21 1951    Specimen: Blood from Arm, Left Updated: 10/10/21 2015     Blood Culture No growth at 5 days    Narrative:      The previously reported component GRAM STAIN is no longer being reported. Previous result was <null> (Reference Range: [Reference Range]) on 10/8/2021 at 2015 EDT.    Calcium, Ionized [[id number]]  (Normal) Collected: 10/10/21 0442    Specimen: Blood Updated: 10/10/21 1101     Ionized Calcium 1.20 mmol/L      Ionized Calcium 4.8 mg/dL           PENDING RESULTS: Right lung biopsy report is pending    IMAGING REVIEWED:  CT Cervical Spine Without Contrast    Result Date: 10/9/2021  1.Osseous destructive changes involving right C1 lateral mass, concerning for metastatic disease. 2.Degenerative changes of cervical spine as described above.  Electronically Signed By-Bogdan Marquez MD On:10/9/2021 5:03 PM This report was finalized on [id number] by  Bogdan Marquez MD.      I have reviewed the patient's labs,  imaging, reports, and other clinician documentation.    Assessment/Plan   ASSESSMENT:  1.Right lung lower lobe mass, characterized as hypodense lesion: Patient had a biopsy the biopsy report is pending.  Attempted aspiration of this mass was not successful and it suggestive of solid or thick purulent effusion.  He is currently receiving antibiotics with ceftriaxone.  Final Diagnosis   Mass, right lower lobe, biopsies:    Invasive poorly differentiated squamous cell carcinoma     See comment        ,7   Comment    Immunohistochemical stains were applied with valid controls and are reported as follows: The tumor is positive for cytokeratin 5/6, p40 and p63. The tumor is negative for cytokeratin 7, TTF-1 and napsin A. The immunohistochemical staining pattern supports the rendered diagnosis   No family members with him at this time.  I will divulge this information in the presence of family members. Today his daughter was here and his metastatic disease is discussed with him  2.Anemia: He has normochromic normocytic anemia.  Iron profile is inconclusive with elevated ferritin and iron saturation 9%.  B12 and folic acid are normal     3.Elevated LFTs: There is no evidence of metastatic disease in the liver based on the imaging.  Hepatitis profile is negative.  He will require total body PET scan as an outpatient since lung biopsy showed squamous cell carcinoma.There is history of rectal bleeding intermittently and Hg continues to decline  4.  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  He is currently getting Samsca and managed by nephrologist   5.Cervical spine 1 atlas mets: CT of cervical spine showed mets to atlas. Neurosurgical evaluation and XRT consultation will be requested    PLAN:  1. Neurosurgeon evaluation regarding mets Cervical spine atlas which is pending  2. Total body PET scan as an outpatient.  3. Radiation oncology consultation was called and spoke to Dr. Delcid            I discussed the  patients findings and my recommendations with patient.

## 2021-10-11 NOTE — CONSULTS
"Radiation Oncology Consult Note    Name: Prabhjot Roldan  YOB: 1935  MRN #: [id number]  Date of Service: 10/11/2021  Referring Provider: Herve Baez MD  [address] [place] [address],  [place] [id number]  Primary Care Provider: Herve Baez MD    DIAGNOSIS: Lung cancer, C1 metastasis; NSCLC--Squamous cell carcinoma    REASON FOR CONSULTATION/CHIEF COMPLAINT:  \"Neck pain and recent lung cancer diagnosis\"  I was asked to see the patient at the request of the referring provider noted below for advice and recommendations regarding this diagnosis and the role of radiation therapy.                              REQUESTING PHYSICIAN:  Herve Baez Md  [address] [place] [address],  [place] [id number]    RECORDS OBTAINED:  Records of the patients history including those obtained from the referring provider were reviewed and summarized in detail.    HISTORY OF PRESENT ILLNESS:  Prabhjot Roldan is a 86 y.o. male who presented to [facility] 10/5/2021 with AMS.  CAP was suspected and he was hypotensive.  CT chest showed s right lower lobe mass    CT CAP 10/5/2021--IMPRESSION:  1. There is a large low-density mass in the right lung base measuring 9.5 x 5.3 cm. This is favored to represent a neoplastic process which must be considered and excluded. This lesion would be amenable to CT-guided biopsy as determined clinically.   Alternatively, a PET/CT study could be obtained for better characterization. Follow-up is recommended.  2. Reticulonodular type infiltrates in the right upper lobe with additional nodular type groundglass and confluent groundglass airspace opacities in the left upper lobe and lingula. Findings are most likely related to infectious bronchopneumonia,   possibly related to viral pneumonia and/or aspiration. COVID pneumonia should be considered.  3. Element of interlobular septal thickening may represent early pulmonary edema.  4. Emphysema.  5. Advanced coronary artery calcifications.  6. Enlarged " subcarinal lymph node measuring 1.8 cm in short axis. Metastatic disease must be excluded.  7. Hepatic steatosis. The liver also demonstrates a very subtle micronodular contour which may represent liver disease or early cirrhosis. Correlation with hepatic function is recommended.  8. Diverticulosis.    CT head without contrast on 10/5/2021--no acute intracranial abnormality.    10/7/2021--CT guided lung biopsy was performed-- Right lung invasive squamous cell carcinoma.    For neck pain--CT cervical spine without contrast was done on 10/9/2021-- No acute fracture identified.  Destructive changes involving the right C1 lateral mass.    He has been recommended a pet/ct to complete staging which will need to be done as an outpatient.    Looking back, he had an MRI cervical spine on 04/14/2021--Degenerative disc disease was noted with facet degenerative change and uncovertebral joint degenerative change of the cervical spine; mild anterolisthesis C4 on C5 and C5 on C6.    This was treated with an injection in the neck with pain relief at that time.    I spoke with Dr. Sanchez earlier today.  The patient has seen Neurosurgery at his request.  They are ordering MRI cervical spine and will follow-up with recommendations regarding stability and need.They have recommended soft cervical collar at this time for stability as well.    Patient's daughter assists greatly in history as patient is having increased O2 demand and nec/headaches.  He notes the neck pain radiates into his right shoulder.        The following portions of the patient's history were reviewed and updated as appropriate: allergies, current medications, past family history, past medical history, past social history, past surgical history and problem list. Reviewed with the patient and remain unchanged.    PAST MEDICAL HISTORY:  he  has a past medical history of Arthritis, Hyperlipidemia, Hypertension, and Neck pain.  MEDICATIONS:   Current  Facility-Administered Medications:   •  aspirin EC tablet 81 mg, 81 mg, Oral, Daily, Herve Baez MD, 81 mg at 10/10/21 0859  •  bisacodyl (DULCOLAX) suppository 10 mg, 10 mg, Rectal, Daily PRN, Kennedi Cárdenas APRN  •  budesonide (PULMICORT) nebulizer solution 0.5 mg, 0.5 mg, Nebulization, BID - RT, Herve Baez MD, 0.5 mg at 10/11/21 0707  •  fentaNYL (DURAGESIC) 12 MCG/HR 1 patch, 1 patch, Transdermal, Q72H, 1 patch at 10/10/21 1203 **AND** Check Fentanyl Patch Placement, 1 each, Does not apply, Q12H, Kennedi Cárdenas APRN  •  dilTIAZem CD (CARDIZEM CD) 24 hr capsule 180 mg, 180 mg, Oral, Q24H, Herve Baez MD, 180 mg at 10/10/21 0859  •  docusate sodium (COLACE) capsule 100 mg, 100 mg, Oral, BID, Kennedi Cárdenas APRN, 100 mg at 10/10/21 2037  •  enoxaparin (LOVENOX) syringe 40 mg, 40 mg, Subcutaneous, Daily, Herve Baez MD, 40 mg at 10/10/21 1659  •  HYDROcodone-acetaminophen (NORCO) 5-325 MG per tablet 1 tablet, 1 tablet, Oral, Q4H PRN, Herve Baez MD, 1 tablet at 10/11/21 0230  •  ipratropium-albuterol (DUO-NEB) nebulizer solution 3 mL, 3 mL, Nebulization, 4x Daily - RT, Herve Baez MD, 3 mL at 10/11/21 0707  •  magnesium hydroxide (MILK OF MAGNESIA) suspension 10 mL, 10 mL, Oral, Daily PRN, Kennedi Cárdenas APRN, 10 mL at 10/10/21 1203  •  Magnesium Sulfate 2 gram Bolus, followed by 8 gram infusion (total Mg dose 10 grams)- Mg less than or equal to 1mg/dL, 2 g, Intravenous, PRN **OR** Magnesium Sulfate 2 gram / 50mL Infusion (GIVE X 3 BAGS TO EQUAL 6GM TOTAL DOSE) - Mg 1.1 - 1.5 mg/dl, 2 g, Intravenous, PRN, Last Rate: 25 mL/hr at 10/08/21 0340, 2 g at 10/08/21 0340 **OR** Magnesium Sulfate 4 gram infusion- Mg 1.6-1.9 mg/dL, 4 g, Intravenous, PRN, Herve Baez MD  •  potassium chloride (K-DUR,KLOR-CON) CR tablet 40 mEq, 40 mEq, Oral, PRN **OR** potassium chloride (KLOR-CON) packet 40 mEq, 40 mEq, Oral, PRN **OR** potassium chloride 10 mEq in 100 mL IVPB, 10 mEq,  Intravenous, Q1H PRN, Herve Baez MD  •  [COMPLETED] Insert peripheral IV, , , Once **AND** sodium chloride 0.9 % flush 10 mL, 10 mL, Intravenous, PRN, Herve Baez MD, 10 mL at 10/10/21 0900  •  tiZANidine (ZANAFLEX) tablet 4 mg, 4 mg, Oral, Nightly PRN, Herve Baez MD  ALLERGIES: No Known Allergies  PAST SURGICAL HISTORY: he has a past surgical history that includes Hernia repair.  PREVIOUS RADIOTHERAPY OR CHEMOTHERAPY: no  FAMILY HISTORY: his family history includes Cancer in his father and mother; Diabetes in his mother; Heart attack in his father; Prostate cancer in his father.  SOCIAL HISTORY: he  reports that he has never smoked. He has never used smokeless tobacco. Drug use questions deferred to the physician. He reports that he does not drink alcohol.  PAIN AND PAIN MANAGEMENT: 10/10; inpatient team working on pain control.  Vitals:    10/11/21 0510 10/11/21 0525 10/11/21 0707 10/11/21 0709   BP:       BP Location:       Patient Position:       Pulse:   112 113   Resp:   21 (!) 35   Temp:       TempSrc:       SpO2: 100% 100% 97% 94%   Weight:       Height:         NUTRITIONAL STATUS:   no issues  KPS: 60:  Occasional assistance required; capable of most self-care; acutely    PHQ-9 Total Score:distress tool not completed.    Review of Systems:  General: No fevers, chills, + weight change, no drenching night sweats. Skin: No rashes or jaundice.  HEENT: No change in vision or hearing, + headaches.  Neck: + pain, No dysphagia or masses.  Heme/Lymph: No easy bruising or bleeding.  Respiratory System: +SOB.  Cardiovascular: No chest pain, palpitations, or dyspnea on exertion.  - Pacemaker. GI: No nausea, vomiting, diarrhea, melena, or hematochezia.  : No dysuria or hematuria.  Endocrine: No heat or cold intolerance. Musculoskeletal: No myalgias or arthralgias.  Neuro: No weakness, numbness, syncope, or seizures. Psych: No mood changes or depression. Ext: Denies swelling.        Objective      Vitals:  Vitals:    10/11/21 0510 10/11/21 0525 10/11/21 0707 10/11/21 0709   BP:       BP Location:       Patient Position:       Pulse:   112 113   Resp:   21 (!) 35   Temp:       TempSrc:       SpO2: 100% 100% 97% 94%   Weight:       Height:             PHYSICAL EXAM: Limited due to severe pain--just got his pain medications when I was in the room.  GENERAL: uncomfortable     HEENT: normocephalic, atraumatic. conjunctiva not injected.   NECK: not examined  CARDIOVASCULAR: tachy  CHEST: increased work of breathing; family thinks related to his pain.  MUSCULOSKELETAL: Decreased ROM, head turn.  EXTREMITIES: no clubbing, cyanosis, edema.  SKIN: no erythema, rashes, ulcerations noted.   NEUROLOGIC: SARTHAK just completed exam--reflex were 0, motor exam showed left deltoid 4/5; rest 5/5..  PSYCHIATRIC:  alert, aware, and appropriate.      PERTINENT IMAGING/PATHOLOGY/LABS (Medical Decision Making):     COORDINATION OF CARE: A copy of this note is sent to the referring provider.    PATHOLOGY (Reviewed): as noted above     IMAGING (Reviewed): as noted above.    LABS (Reviewed):  Hematology WBC   Date Value Ref Range Status   10/11/2021 17.10 (H) 3.40 - 10.80 10*3/mm3 Final     RBC   Date Value Ref Range Status   10/11/2021 2.74 (L) 4.14 - 5.80 10*6/mm3 Final     Hemoglobin   Date Value Ref Range Status   10/11/2021 8.3 (L) 13.0 - 17.7 g/dL Final     Hematocrit   Date Value Ref Range Status   10/11/2021 24.3 (L) 37.5 - 51.0 % Final     Platelets   Date Value Ref Range Status   10/11/2021 439 140 - 450 10*3/mm3 Final      Chemistry   Lab Results   Component Value Date    GLUCOSE 114 (H) 10/11/2021    BUN 16 10/11/2021    CREATININE 0.62 (L) 10/11/2021    EGFRIFNONA 124 10/11/2021    BCR 25.8 (H) 10/11/2021    K 3.6 10/11/2021    CO2 27.0 10/11/2021    CALCIUM 8.5 (L) 10/11/2021    ALBUMIN 2.70 (L) 10/10/2021     (H) 10/10/2021     (H) 10/10/2021       Assessment/Plan     ASSESSMENT AND PLAN:  1. Sepsis, due  to unspecified organism, unspecified whether acute organ dysfunction present (HCC)    2. Ammonia dermatitis    3. Pneumonia of both lungs due to infectious organism, unspecified part of lung    4. Hyponatremia    5. Hypokalemia    6. Weakness    7. Lung mass       -Newly diagnosed Squamous Cell Carcinoma of the lung  -during admission noted to have severe neck pain--CT cervical series shows destructive lesion at C1.  Radiation Oncology and SARTHAK have been consulted. Neurosurgery recommended C-collar and MRI cervical spine and evaluation post-imaging.    Will await imaging.  Discussed with patient's daughter the findings and potential role for palliative XRT to the C1 region.      Discussed with Dr. Sanchez today as well.    This assessment comes from my review of the imaging, pathology, physician notes and other pertinent information as mentioned.    DISPOSITION: pending MRI and neurosurgery eval.          TIME SPENT WITH PATIENT:   Floor time, spent in room and on-floor coordinating/reviewing documents/images today total >50 minutes.         CC: Herve Baez MD Heimer, Brian T, MD Azmi Draw, MD Murugavel Muthusamy, MD John A Cox, MD  10/11/2021  8:04 AM EDT

## 2021-10-11 NOTE — PROGRESS NOTES
"PULMONARY CRITICAL CARE Progress  NOTE      PATIENT IDENTIFICATION:  Name: Prabhjot Roldan  MRN: [id number]  :  1935     Age: 86 y.o.  Sex: male    DATE OF Note:  10/11/2021   Referring Physician: Herve Baez MD                  Subjective:   Feeling better,  Per staff he was restless last night  No SOB no chest pain, no nausea or vomiting, no change in bowel habit, no dysuria,  no new  skin rash or itching.      Objective:  tMax 24 hrs: Temp (24hrs), Av.4 °F (36.9 °C), Min:97.3 °F (36.3 °C), Max:99.3 °F (37.4 °C)      Vitals Ranges:   Temp:  [97.3 °F (36.3 °C)-99.3 °F (37.4 °C)] 99.3 °F (37.4 °C)  Heart Rate:  [] 113  Resp:  [15-35] 35  BP: ()/(43-69) 104/60    Intake and Output Last 3 Shifts:   I/O last 3 completed shifts:  In: 360 [P.O.:360]  Out: 1625 [Urine:1625]    Exam:  /60 (BP Location: Left arm, Patient Position: Lying)   Pulse 113   Temp 99.3 °F (37.4 °C) (Oral)   Resp (!) 35   Ht 175.3 cm (69\")   Wt 84.1 kg (185 lb 4.8 oz)   SpO2 94%   BMI 27.36 kg/m²     General Appearance:   Sleepy but arousable  HEENT:  Normocephalic, without obvious abnormality, Conjunctiva/corneas clear,.  Normal external ear canals, Nares normal, no drainage     Neck:  Supple, symmetrical, trachea midline. No JVD.  Lungs /Chest wall:   Bilateral basal rhonchi, respirations unlabored symmetrical wall movement.     Heart:  Regular rate and rhythm, systolic murmur PMI left sternal border  Abdomen: Soft, non-tender, no masses, no organomegaly.    Extremities: Trace edema no clubbing or Cyanosis        Medications:    Current Facility-Administered Medications:   •  aspirin EC tablet 81 mg, 81 mg, Oral, Daily, Herve Baez MD, 81 mg at 10/10/21 0859  •  bisacodyl (DULCOLAX) suppository 10 mg, 10 mg, Rectal, Daily PRN, Kennedi Cárdenas, APRN  •  budesonide (PULMICORT) nebulizer solution 0.5 mg, 0.5 mg, Nebulization, BID - RT, Herve Baez MD, 0.5 mg at 10/11/21 0707  •  fentaNYL " (DURAGESIC) 12 MCG/HR 1 patch, 1 patch, Transdermal, Q72H, 1 patch at 10/10/21 1203 **AND** Check Fentanyl Patch Placement, 1 each, Does not apply, Q12H, Kennedi Cárdenas APRN  •  dilTIAZem CD (CARDIZEM CD) 24 hr capsule 180 mg, 180 mg, Oral, Q24H, Herve Baez MD, 180 mg at 10/10/21 0859  •  docusate sodium (COLACE) capsule 100 mg, 100 mg, Oral, BID, Kennedi Cárdenas APRN, 100 mg at 10/10/21 2037  •  enoxaparin (LOVENOX) syringe 40 mg, 40 mg, Subcutaneous, Daily, Herve Baez MD, 40 mg at 10/10/21 1659  •  HYDROcodone-acetaminophen (NORCO) 5-325 MG per tablet 1 tablet, 1 tablet, Oral, Q4H PRN, Herve Baez MD, 1 tablet at 10/11/21 0230  •  ipratropium-albuterol (DUO-NEB) nebulizer solution 3 mL, 3 mL, Nebulization, 4x Daily - RT, Herve Baez MD, 3 mL at 10/11/21 0707  •  magnesium hydroxide (MILK OF MAGNESIA) suspension 10 mL, 10 mL, Oral, Daily PRN, Kennedi Cárdenas APRN, 10 mL at 10/10/21 1203  •  Magnesium Sulfate 2 gram Bolus, followed by 8 gram infusion (total Mg dose 10 grams)- Mg less than or equal to 1mg/dL, 2 g, Intravenous, PRN **OR** Magnesium Sulfate 2 gram / 50mL Infusion (GIVE X 3 BAGS TO EQUAL 6GM TOTAL DOSE) - Mg 1.1 - 1.5 mg/dl, 2 g, Intravenous, PRN, Last Rate: 25 mL/hr at 10/08/21 0340, 2 g at 10/08/21 0340 **OR** Magnesium Sulfate 4 gram infusion- Mg 1.6-1.9 mg/dL, 4 g, Intravenous, PRN, Herve Baez MD  •  potassium chloride (K-DUR,KLOR-CON) CR tablet 40 mEq, 40 mEq, Oral, PRN **OR** potassium chloride (KLOR-CON) packet 40 mEq, 40 mEq, Oral, PRN **OR** potassium chloride 10 mEq in 100 mL IVPB, 10 mEq, Intravenous, Q1H PRN, Herve Baez MD  •  [COMPLETED] Insert peripheral IV, , , Once **AND** sodium chloride 0.9 % flush 10 mL, 10 mL, Intravenous, PRN, Herve Baez MD, 10 mL at 10/10/21 0900  •  tiZANidine (ZANAFLEX) tablet 4 mg, 4 mg, Oral, Nightly PRN, Herve Baez MD    Data Review:  All labs (24hrs):   Recent Results (from the past 24 hour(s))   CBC (No  Diff)    Collection Time: 10/11/21  3:25 AM    Specimen: Blood   Result Value Ref Range    WBC 17.10 (H) 3.40 - 10.80 10*3/mm3    RBC 2.74 (L) 4.14 - 5.80 10*6/mm3    Hemoglobin 8.3 (L) 13.0 - 17.7 g/dL    Hematocrit 24.3 (L) 37.5 - 51.0 %    MCV 88.4 79.0 - 97.0 fL    MCH 30.2 26.6 - 33.0 pg    MCHC 34.1 31.5 - 35.7 g/dL    RDW 14.8 12.3 - 15.4 %    RDW-SD 45.5 37.0 - 54.0 fl    MPV 7.2 6.0 - 12.0 fL    Platelets 439 140 - 450 10*3/mm3   Basic Metabolic Panel    Collection Time: 10/11/21  3:25 AM    Specimen: Blood   Result Value Ref Range    Glucose 114 (H) 65 - 99 mg/dL    BUN 16 8 - 23 mg/dL    Creatinine 0.62 (L) 0.76 - 1.27 mg/dL    Sodium 129 (L) 136 - 145 mmol/L    Potassium 3.6 3.5 - 5.2 mmol/L    Chloride 88 (L) 98 - 107 mmol/L    CO2 27.0 22.0 - 29.0 mmol/L    Calcium 8.5 (L) 8.6 - 10.5 mg/dL    eGFR Non African Amer 124 >60 mL/min/1.73    BUN/Creatinine Ratio 25.8 (H) 7.0 - 25.0    Anion Gap 14.0 5.0 - 15.0 mmol/L   Magnesium    Collection Time: 10/11/21  3:25 AM    Specimen: Blood   Result Value Ref Range    Magnesium 1.8 1.6 - 2.4 mg/dL   Phosphorus    Collection Time: 10/11/21  3:25 AM    Specimen: Blood   Result Value Ref Range    Phosphorus 2.1 (L) 2.5 - 4.5 mg/dL   CK    Collection Time: 10/11/21  3:25 AM    Specimen: Blood   Result Value Ref Range    Creatine Kinase 66 20 - 200 U/L   Blood Gas, Arterial -    Collection Time: 10/11/21  5:15 AM    Specimen: Arterial Blood   Result Value Ref Range    Site Right Radial     Al's Test Positive     pH, Arterial 7.462 (H) 7.350 - 7.450 pH units    pCO2, Arterial 36.4 35.0 - 48.0 mm Hg    pO2, Arterial 86.1 83.0 - 108.0 mm Hg    HCO3, Arterial 26.0 21.0 - 28.0 mmol/L    Base Excess, Arterial 2.1 0.0 - 3.0 mmol/L    O2 Saturation, Arterial 97.1 94.0 - 98.0 %    CO2 Content 27.1 22 - 29 mmol/L    Barometric Pressure for Blood Gas      Modality NRB     FIO2 100 %    Hemodilution No         Imaging:  CT Cervical Spine Without Contrast  Narrative: DATE OF  EXAM:  10/9/2021 4:27 PM     PROCEDURE:  CT CERVICAL SPINE WO CONTRAST-     INDICATIONS:   Neck pain, acute, known malignancy; A41.9-Sepsis, unspecified organism;  L22-Diaper dermatitis; J18.9-Pneumonia, unspecified organism;  E87.6-Hpss-wyyusoeosu and hyponatremia; E87.6-Hypokalemia;  R53.1-Weakness; R91.8-Other nonspecific abnormal finding of lung field     COMPARISON:   No Comparisons Available     TECHNIQUE:  CT scan of the cervical spine without IV contrast. Automated exposure  control and iterative construction methods were used.      FINDINGS:  No acute fracture is identified. There are destructive changes involving  the right C1 lateral mass. There is minimal anterolisthesis of C4 on C5.  The vertebral body heights appear normal. There are moderate discogenic  changes at C5-C6 through T1-2. No high-grade spinal canal stenosis is  identified. There is uncovertebral hypertrophy and facet arthropathy at  several levels, resulting in up to severe neural foraminal stenosis on  the right at C3-4, on the right at C4-5, and bilaterally at C5-6. The  prevertebral soft tissues are unremarkable.     Impression: 1.Osseous destructive changes involving right C1 lateral mass,  concerning for metastatic disease.  2.Degenerative changes of cervical spine as described above.     Electronically Signed By-Bogdan Marquez MD On:10/9/2021 5:03 PM  This report was finalized on [id number] by  Bogdan Marquez MD.       ASSESSMENT:  Acute hypoxic respiratory failure requiring 6 L oxygen  COPD cerebration  Possible pneumonitis/edema  Squamous cell lung cancer  Elevated liver enzymes  Metabolic encephalopathy    PLAN:  Oxygen supplement  Oncology following  Continue steroid for now  Bronchodilator  Inhaled corticosteroids  Electrolytes/ glycemic control  DVT and GI prophylaxis.    Total Critical care time in direct medical management (   ) minutes  Delia Pacheco MD. D, ABSM.     10/11/2021  08:31 EDT

## 2021-10-11 NOTE — PROGRESS NOTES
LOS: 5 days   Patient Care Team:  Herve Baez MD as PCP - General (Family Medicine)  Uche Mojica MD as Consulting Physician (Nephrology)  Estefania Sanchez MD as Consulting Physician (Hematology and Oncology)    Subjective:  Events noted    Objective:   Afebrile      Review of Systems:   Review of Systems        Vital Signs  Temp:  [97.3 °F (36.3 °C)-99.3 °F (37.4 °C)] 99.3 °F (37.4 °C)  Heart Rate:  [] 119  Resp:  [15-35] 35  BP: ()/(43-69) 104/60    Physical Exam:  Physical Exam  Vitals and nursing note reviewed.          Radiology:  CT Head Without Contrast    Result Date: 10/5/2021  No acute intracranial abnormality. Electronically signed by:  Lenny Mckee  10/5/2021 9:37 PM    CT Chest With Contrast Diagnostic    Result Date: 10/5/2021  1. There is a large low-density mass in the right lung base measuring 9.5 x 5.3 cm. This is favored to represent a neoplastic process which must be considered and excluded. This lesion would be amenable to CT-guided biopsy as determined clinically. Alternatively, a PET/CT study could be obtained for better characterization. Follow-up is recommended. 2. Reticulonodular type infiltrates in the right upper lobe with additional nodular type groundglass and confluent groundglass airspace opacities in the left upper lobe and lingula. Findings are most likely related to infectious bronchopneumonia, possibly related to viral pneumonia and/or aspiration. COVID pneumonia should be considered. 3. Element of interlobular septal thickening may represent early pulmonary edema. 4. Emphysema. 5. Advanced coronary artery calcifications. 6. Enlarged subcarinal lymph node measuring 1.8 cm in short axis. Metastatic disease must be excluded. 7. Hepatic steatosis. The liver also demonstrates a very subtle micronodular contour which may represent liver disease or early cirrhosis. Correlation with hepatic function is recommended. 8. Diverticulosis. 9. Severe  atherosclerotic disease. 10. Additional incidental and chronic findings as above. *[employer] SOCIETY GUIDELINES: Low risk patient: <6mm - Low Risk, no further f/u >6-8mm - low dose CT 6-12 mo, then 2nd f/u CT at 18-24mo >8mm - low dose CT 3,9,24mo OR PET/CT OR Biopsy High Risk Patient: <6mm - optional CT at 12 mo >6-8mm - low dose CT 6-12 mo, then 2nd f/u CT at 18-24mo >8mm - low dose CT at 3 mo, OR PET/CT OR Biopsy MULTIPLE NODULES: Low Risk Patient: <6mm - Low Risk, no further f/u >6-8mm - low dose CT 3-6 mo, then 2nd f/u CT at 18-24mo >8mm - low dose CT 3-6 mo, then 2nd f/u CT at 18-24mo High Risk Patient: <6mm - High risk, multiple nodules, optional CT at 12 mo >6-8mm - low dose CT 3-6 mo, then 2nd f/u CT at 18-24mo >8mm - low dose CT 3-6 mo, then 2nd f/u CT at 18-24mo Slot 67 Electronically signed by:  Arcenio Vanegas M.D.  10/5/2021 9:30 PM    CT Cervical Spine Without Contrast    Result Date: 10/9/2021  1.Osseous destructive changes involving right C1 lateral mass, concerning for metastatic disease. 2.Degenerative changes of cervical spine as described above.  Electronically Signed By-Bogdan Marquez MD On:10/9/2021 5:03 PM This report was finalized on [id number] by  Bogdan Marquez MD.    CT Abdomen Pelvis With Contrast    Result Date: 10/5/2021  1. There is a large low-density mass in the right lung base measuring 9.5 x 5.3 cm. This is favored to represent a neoplastic process which must be considered and excluded. This lesion would be amenable to CT-guided biopsy as determined clinically. Alternatively, a PET/CT study could be obtained for better characterization. Follow-up is recommended. 2. Reticulonodular type infiltrates in the right upper lobe with additional nodular type groundglass and confluent groundglass airspace opacities in the left upper lobe and lingula. Findings are most likely related to infectious bronchopneumonia, possibly related to viral pneumonia and/or aspiration. COVID  pneumonia should be considered. 3. Element of interlobular septal thickening may represent early pulmonary edema. 4. Emphysema. 5. Advanced coronary artery calcifications. 6. Enlarged subcarinal lymph node measuring 1.8 cm in short axis. Metastatic disease must be excluded. 7. Hepatic steatosis. The liver also demonstrates a very subtle micronodular contour which may represent liver disease or early cirrhosis. Correlation with hepatic function is recommended. 8. Diverticulosis. 9. Severe atherosclerotic disease. 10. Additional incidental and chronic findings as above. *[employer] SOCIETY GUIDELINES: Low risk patient: <6mm - Low Risk, no further f/u >6-8mm - low dose CT 6-12 mo, then 2nd f/u CT at 18-24mo >8mm - low dose CT 3,9,24mo OR PET/CT OR Biopsy High Risk Patient: <6mm - optional CT at 12 mo >6-8mm - low dose CT 6-12 mo, then 2nd f/u CT at 18-24mo >8mm - low dose CT at 3 mo, OR PET/CT OR Biopsy MULTIPLE NODULES: Low Risk Patient: <6mm - Low Risk, no further f/u >6-8mm - low dose CT 3-6 mo, then 2nd f/u CT at 18-24mo >8mm - low dose CT 3-6 mo, then 2nd f/u CT at 18-24mo High Risk Patient: <6mm - High risk, multiple nodules, optional CT at 12 mo >6-8mm - low dose CT 3-6 mo, then 2nd f/u CT at 18-24mo >8mm - low dose CT 3-6 mo, then 2nd f/u CT at 18-24mo Slot 67 Electronically signed by:  Arcenio Vanegas M.D.  10/5/2021 9:30 PM    US Liver    Result Date: 10/7/2021  Normal hepatic ultrasound.  Electronically Signed By-Opal Perales MD On:10/7/2021 9:50 AM This report was finalized on [id number] by  Opal Perales MD.    XR Chest 1 View    Result Date: 10/7/2021  No visible pneumothorax status post right lower lobe lung mass biopsy earlier today.  Electronically Signed By-Opal Perales MD On:10/7/2021 10:17 AM This report was finalized on [id number] by  Opal Perales MD.    XR Chest 1 View    Result Date: 10/5/2021  1. There is an abnormality in the right lower chest. Part of this has a masslike  configuration. It may be due to parenchymal parenchymal disease, pleural disease, or both. A lung mass is possible. Further evaluation with chest CT with contrast is recommended. 2. Question some minor opacity in the lateral left midlung which may be due to pneumonia.  Electronically Signed By-Mragarita Cooper MD On:10/5/2021 9:06 PM This report was finalized on [id number] by  Margarita Cooper MD.    CT Needle Biopsy Lung    Result Date: 10/7/2021  Technically successful biopsy of the pleural-based mass at the right lung base. Initial preliminary interpretation suggest dense fibrous tissue. Final path results are pending. Postprocedure chest radiograph is pending.  Electronically Signed By-Aly Brandon MD On:10/7/2021 10:07 AM This report was finalized on [id number] by  Aly Brandon MD.         Results Review:     I reviewed the patient's new clinical results.  I reviewed the patient's new imaging results and agree with the interpretation.    Medication Review:   Scheduled Meds:acetylcysteine, 2 mL, Nebulization, BID - RT  Acetylcysteine, 600 mg, Oral, BID  aspirin, 81 mg, Oral, Daily  budesonide, 0.5 mg, Nebulization, BID - RT  fentaNYL, 1 patch, Transdermal, Q72H   And  Check Fentanyl Patch Placement, 1 each, Does not apply, Q12H  dilTIAZem CD, 180 mg, Oral, Q24H  docusate sodium, 100 mg, Oral, BID  enoxaparin, 40 mg, Subcutaneous, Daily  ipratropium-albuterol, 3 mL, Nebulization, 4x Daily - RT  methylPREDNISolone sodium succinate, 60 mg, Intravenous, Q8H      Continuous Infusions:   PRN Meds:.bisacodyl  •  HYDROcodone-acetaminophen  •  magnesium hydroxide  •  magnesium sulfate **OR** magnesium sulfate **OR** magnesium sulfate  •  potassium chloride **OR** potassium chloride **OR** potassium chloride  •  [COMPLETED] Insert peripheral IV **AND** sodium chloride  •  tiZANidine    Labs:    CBC    Results from last 7 days   Lab Units 10/11/21  0325 10/10/21  0442 10/08/21  0533 10/07/21  0546 10/06/21  0906  10/05/21  1951   WBC 10*3/mm3 17.10* 10.10 13.00* 15.60* 19.60* 14.20*   HEMOGLOBIN g/dL 8.3* 9.2* 11.1* 11.7* 12.6* 11.7*   PLATELETS 10*3/mm3 439 363 300 317 316 392     BMP   Results from last 7 days   Lab Units 10/11/21  0325 10/10/21  0442 10/09/21  0531 10/09/21  0117 10/08/21  1205 10/08/21  0533 10/08/21  0042 10/07/21  1902 10/07/21  1902 10/07/21  0547 10/06/21  0906 10/05/21  1951 10/05/21  1951   SODIUM mmol/L 129* 130*  --  130* 127* 127* 127*  --  129*   < > 123*   < > 120*   POTASSIUM mmol/L 3.6 3.5  --  3.5 3.6 3.5 3.6  --  3.7   < > 3.1*   < > 2.5*   CHLORIDE mmol/L 88* 91*  --  92* 87* 89* 89*  --  91*   < > 82*   < > 75*   CO2 mmol/L 27.0 30.0*  --  30.0* 26.0 25.0 26.0  --  24.0   < > 25.0   < > 21.0*   BUN mg/dL 16 15  --  18 21 20 22  --  22   < > 14   < > 16   CREATININE mg/dL 0.62* 0.59* 0.59* 0.62* 0.66* 0.62* 0.65*   < > 0.66*   < > 0.67*   < > 0.96   GLUCOSE mg/dL 114* 87  --  111* 153* 103* 128*  --  145*   < > 101*   < > 178*   MAGNESIUM mg/dL 1.8 1.9 2.2  --   --   --   --   --   --   --  1.5*  --  1.5*   PHOSPHORUS mg/dL 2.1* 2.3*  --   --   --   --   --   --   --   --   --   --   --     < > = values in this interval not displayed.     Cr Clearance Estimated Creatinine Clearance: 81.8 mL/min (A) (by C-G formula based on SCr of 0.62 mg/dL (L)).  Coag   Results from last 7 days   Lab Units 10/05/21  1951   INR  1.18*   APTT seconds 30.8     HbA1C No results found for: HGBA1C  Blood Glucose No results found for: POCGLU  Infection   Results from last 7 days   Lab Units 10/05/21  2001 10/05/21  1957 10/05/21  1951   BLOODCX  No growth at 5 days  --  No growth at 5 days   URINECX   --  No growth  --      CMP   Results from last 7 days   Lab Units 10/11/21  0325 10/10/21  0442 10/09/21  0531 10/09/21  0117 10/08/21  1205 10/08/21  0533 10/08/21  0042 10/07/21  1902 10/07/21  1902 10/07/21  1252 10/07/21  0547 10/06/21  0906 10/06/21  0906 10/05/21  1951 10/05/21  1951   SODIUM mmol/L 129*  130*  --  130* 127* 127* 127*  --  129*   < > 124*   < > 123*   < > 120*   POTASSIUM mmol/L 3.6 3.5  --  3.5 3.6 3.5 3.6  --  3.7   < > 3.7   < > 3.1*   < > 2.5*   CHLORIDE mmol/L 88* 91*  --  92* 87* 89* 89*  --  91*   < > 87*   < > 82*   < > 75*   CO2 mmol/L 27.0 30.0*  --  30.0* 26.0 25.0 26.0  --  24.0   < > 24.0   < > 25.0   < > 21.0*   BUN mg/dL 16 15  --  18 21 20 22  --  22   < > 22   < > 14   < > 16   CREATININE mg/dL 0.62* 0.59* 0.59* 0.62* 0.66* 0.62* 0.65*   < > 0.66*   < > 0.65*   < > 0.67*   < > 0.96   GLUCOSE mg/dL 114* 87  --  111* 153* 103* 128*  --  145*   < > 90   < > 101*   < > 178*   ALBUMIN g/dL  --  2.70*  --   --   --  2.50*  --   --   --   --  2.60*  --  3.00*  --  3.30*   BILIRUBIN mg/dL  --  0.5  --   --   --  0.9  --   --   --   --  0.9  --  0.8  --  0.6   ALK PHOS U/L  --  68  --   --   --  74  --   --   --   --  77  --  88  --  83   AST (SGOT) U/L  --  236*  --   --   --  965*  --   --   --   --  814*  --  260*  --  155*   ALT (SGPT) U/L  --  412*  --   --   --  881*  --   --   --   --  592*  --  177*  --  92*   LIPASE U/L  --   --   --   --   --   --   --   --   --   --   --   --   --   --  47    < > = values in this interval not displayed.     UA    Results from last 7 days   Lab Units 10/05/21  1957   NITRITE UA  Negative   WBC UA /HPF 31-50*   BACTERIA UA /HPF None Seen   SQUAM EPITHEL UA /HPF 7-12*   URINECX  No growth     Radiology(recent) CT Cervical Spine Without Contrast    Result Date: 10/9/2021  1.Osseous destructive changes involving right C1 lateral mass, concerning for metastatic disease. 2.Degenerative changes of cervical spine as described above.  Electronically Signed By-Bogdan Marquez MD On:10/9/2021 5:03 PM This report was finalized on [id number] by  Bogdan Marquez MD.     Assessment:    Acute mental status changes secondary to acute metabolic encephalopathy secondary to community-acquired pneumonia  Acute sepsis secondary to the above  Acute right lower lobe  lung mass  Status post ultrasound-guided biopsy of right lower lobe mass 10/7/2021 which revealed invasive poorly diff squamous cell carcinoma  Cervical spine metastases  Panlobular COPD with emphysema  BPH with LUTS  Chronic mucopurulent bronchitis  Hepatic steatosis  Hepatic transaminase derangement  Atherosclerotic heart disease of native coronary arteries with angina pectoris  Coronary calcifications  Chronic kidney disease stage II  Microscopic hematuria  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

## 2021-10-11 NOTE — CASE MANAGEMENT/SOCIAL WORK
Continued Stay Note  MATILDE Reeves     Patient Name: Prabhjot Roldan  MRN: [id number]  Today's Date: 10/11/2021    Admit Date: 10/5/2021     Discharge Plan     Row Name 10/11/21 1344       Plan    Plan [facility] Following, acceptance is pending clincal course and bed availablity, no precert or PASRR need    Plan Comments Confirmed with Liaison with [facility] they are following patient and will look at patient closer to d/c once oxygen need decreases. jose maher on 8L/HF and IV steroids.            Phone communication or documentation only - no physical contact with patient or family.    Melva Mariscal RN

## 2021-10-12 NOTE — SIGNIFICANT NOTE
10/12/21 1402   OTHER   Discipline occupational therapist   Rehab Time/Intention   Session Not Performed other (see comments); unable to treat, medical status change  (RN declined OT eval at this time secondary to pt current oxygen demands.)   Recommendation   OT - Next Appointment 10/13/21

## 2021-10-12 NOTE — PLAN OF CARE
Goal Outcome Evaluation:  Pt A&Ox3, w/ periods of confusion, v/s stable on 10Liters of high flow O2, call light in reach, RN will cont to monitor.

## 2021-10-12 NOTE — PROGRESS NOTES
LOS: 6 days   Patient Care Team:  Herve Baez MD as PCP - General (Family Medicine)  Uche Mojica MD as Consulting Physician (Nephrology)  Estefania Sanchez MD as Consulting Physician (Hematology and Oncology)    Subjective:  No overt distress    Objective:   Rather apathetic      Review of Systems:   Review of Systems   Constitutional: Positive for activity change and appetite change.   Respiratory: Positive for shortness of breath.    Musculoskeletal: Positive for arthralgias, gait problem and neck pain.   Neurological: Positive for weakness.           Vital Signs  Temp:  [97.2 °F (36.2 °C)-98.8 °F (37.1 °C)] 97.2 °F (36.2 °C)  Heart Rate:  [] 108  Resp:  [16-26] 16  BP: ()/(52-66) 97/53    Physical Exam:  Physical Exam  Vitals and nursing note reviewed.   Constitutional:       General: He is not in acute distress.     Appearance: He is ill-appearing. He is not toxic-appearing or diaphoretic.   Cardiovascular:      Rate and Rhythm: Normal rate.      Heart sounds: Normal heart sounds.   Pulmonary:      Breath sounds: Normal breath sounds.   Abdominal:      Palpations: Abdomen is soft.   Skin:     General: Skin is warm.   Neurological:      Mental Status: He is alert.          Radiology:  CT Head Without Contrast    Result Date: 10/5/2021  No acute intracranial abnormality. Electronically signed by:  Lenny Mckee  10/5/2021 9:37 PM    CT Chest With Contrast Diagnostic    Result Date: 10/5/2021  1. There is a large low-density mass in the right lung base measuring 9.5 x 5.3 cm. This is favored to represent a neoplastic process which must be considered and excluded. This lesion would be amenable to CT-guided biopsy as determined clinically. Alternatively, a PET/CT study could be obtained for better characterization. Follow-up is recommended. 2. Reticulonodular type infiltrates in the right upper lobe with additional nodular type groundglass and confluent groundglass airspace opacities  in the left upper lobe and lingula. Findings are most likely related to infectious bronchopneumonia, possibly related to viral pneumonia and/or aspiration. COVID pneumonia should be considered. 3. Element of interlobular septal thickening may represent early pulmonary edema. 4. Emphysema. 5. Advanced coronary artery calcifications. 6. Enlarged subcarinal lymph node measuring 1.8 cm in short axis. Metastatic disease must be excluded. 7. Hepatic steatosis. The liver also demonstrates a very subtle micronodular contour which may represent liver disease or early cirrhosis. Correlation with hepatic function is recommended. 8. Diverticulosis. 9. Severe atherosclerotic disease. 10. Additional incidental and chronic findings as above. *[employer] GUIDELINES: Low risk patient: <6mm - Low Risk, no further f/u >6-8mm - low dose CT 6-12 mo, then 2nd f/u CT at 18-24mo >8mm - low dose CT 3,9,24mo OR PET/CT OR Biopsy High Risk Patient: <6mm - optional CT at 12 mo >6-8mm - low dose CT 6-12 mo, then 2nd f/u CT at 18-24mo >8mm - low dose CT at 3 mo, OR PET/CT OR Biopsy MULTIPLE NODULES: Low Risk Patient: <6mm - Low Risk, no further f/u >6-8mm - low dose CT 3-6 mo, then 2nd f/u CT at 18-24mo >8mm - low dose CT 3-6 mo, then 2nd f/u CT at 18-24mo High Risk Patient: <6mm - High risk, multiple nodules, optional CT at 12 mo >6-8mm - low dose CT 3-6 mo, then 2nd f/u CT at 18-24mo >8mm - low dose CT 3-6 mo, then 2nd f/u CT at 18-24mo Slot [id number] Electronically signed by:  Arcenio Vanegas M.D.  10/5/2021 9:30 PM    CT Cervical Spine Without Contrast    Result Date: 10/9/2021  1.Osseous destructive changes involving right C1 lateral mass, concerning for metastatic disease. 2.Degenerative changes of cervical spine as described above.  Electronically Signed By-Bogdan Marquez MD On:10/9/2021 5:03 PM This report was finalized on [id number] by  Bogdan Marquez MD.    MRI Cervical Spine With & Without Contrast    Result Date:  10/11/2021  IMPRESSION :  1.  4.8 cm enhancing mass surrounding the right lateral mass of the C1 vertebral body.  There is extension of enhancing abnormal soft tissue into the spinal canal at the C1 level.  Findings would be compatible with metastatic lesion.  No additional abnormal signal or pathologic contrast enhancement within the other cervical vertebral bodies. 2.  Multilevel degenerative change of the cervical spine as detailed above. 3.  Small right pleural effusion  Electronically Signed By-Pawan Barnard MD On:10/11/2021 4:01 PM This report was finalized on [id number] by  Pawan Barnard MD.    CT Abdomen Pelvis With Contrast    Result Date: 10/5/2021  1. There is a large low-density mass in the right lung base measuring 9.5 x 5.3 cm. This is favored to represent a neoplastic process which must be considered and excluded. This lesion would be amenable to CT-guided biopsy as determined clinically. Alternatively, a PET/CT study could be obtained for better characterization. Follow-up is recommended. 2. Reticulonodular type infiltrates in the right upper lobe with additional nodular type groundglass and confluent groundglass airspace opacities in the left upper lobe and lingula. Findings are most likely related to infectious bronchopneumonia, possibly related to viral pneumonia and/or aspiration. COVID pneumonia should be considered. 3. Element of interlobular septal thickening may represent early pulmonary edema. 4. Emphysema. 5. Advanced coronary artery calcifications. 6. Enlarged subcarinal lymph node measuring 1.8 cm in short axis. Metastatic disease must be excluded. 7. Hepatic steatosis. The liver also demonstrates a very subtle micronodular contour which may represent liver disease or early cirrhosis. Correlation with hepatic function is recommended. 8. Diverticulosis. 9. Severe atherosclerotic disease. 10. Additional incidental and chronic findings as above. *[employer] SOCIETY GUIDELINES: Low risk  patient: <6mm - Low Risk, no further f/u >6-8mm - low dose CT 6-12 mo, then 2nd f/u CT at 18-24mo >8mm - low dose CT 3,9,24mo OR PET/CT OR Biopsy High Risk Patient: <6mm - optional CT at 12 mo >6-8mm - low dose CT 6-12 mo, then 2nd f/u CT at 18-24mo >8mm - low dose CT at 3 mo, OR PET/CT OR Biopsy MULTIPLE NODULES: Low Risk Patient: <6mm - Low Risk, no further f/u >6-8mm - low dose CT 3-6 mo, then 2nd f/u CT at 18-24mo >8mm - low dose CT 3-6 mo, then 2nd f/u CT at 18-24mo High Risk Patient: <6mm - High risk, multiple nodules, optional CT at 12 mo >6-8mm - low dose CT 3-6 mo, then 2nd f/u CT at 18-24mo >8mm - low dose CT 3-6 mo, then 2nd f/u CT at 18-24mo Slot 67 Electronically signed by:  Arcenio Vanegas M.D.  10/5/2021 9:30 PM    US Liver    Result Date: 10/7/2021  Normal hepatic ultrasound.  Electronically Signed By-Opal Perales MD On:10/7/2021 9:50 AM This report was finalized on [id number] by  Opal Perales MD.    XR Chest 1 View    Result Date: 10/7/2021  No visible pneumothorax status post right lower lobe lung mass biopsy earlier today.  Electronically Signed By-Opal Perales MD On:10/7/2021 10:17 AM This report was finalized on [id number] by  Opal Perales MD.    XR Chest 1 View    Result Date: 10/5/2021  1. There is an abnormality in the right lower chest. Part of this has a masslike configuration. It may be due to parenchymal parenchymal disease, pleural disease, or both. A lung mass is possible. Further evaluation with chest CT with contrast is recommended. 2. Question some minor opacity in the lateral left midlung which may be due to pneumonia.  Electronically Signed By-Margarita Cooper MD On:10/5/2021 9:06 PM This report was finalized on [id number] by  Margarita Cooper MD.    CT Needle Biopsy Lung    Result Date: 10/7/2021  Technically successful biopsy of the pleural-based mass at the right lung base. Initial preliminary interpretation suggest dense fibrous tissue. Final path results are  pending. Postprocedure chest radiograph is pending.  Electronically Signed By-Aly Brandon MD On:10/7/2021 10:07 AM This report was finalized on [id number] by  Aly Brandon MD.         Results Review:     I reviewed the patient's new clinical results.  I reviewed the patient's new imaging results and agree with the interpretation.    Medication Review:   Scheduled Meds:acetylcysteine, 2 mL, Nebulization, BID - RT  Acetylcysteine, 600 mg, Oral, BID  aspirin, 81 mg, Oral, Daily  budesonide, 0.5 mg, Nebulization, BID - RT  fentaNYL, 1 patch, Transdermal, Q72H   And  Check Fentanyl Patch Placement, 1 each, Does not apply, Q12H  dilTIAZem CD, 180 mg, Oral, Q24H  docusate sodium, 100 mg, Oral, BID  enoxaparin, 40 mg, Subcutaneous, Daily  furosemide, 40 mg, Oral, Daily  ipratropium-albuterol, 3 mL, Nebulization, 4x Daily - RT  methylPREDNISolone sodium succinate, 60 mg, Intravenous, Q8H  sodium chloride, 2 g, Oral, TID With Meals      Continuous Infusions:   PRN Meds:.bisacodyl  •  HYDROcodone-acetaminophen  •  magnesium hydroxide  •  magnesium sulfate **OR** magnesium sulfate **OR** magnesium sulfate  •  potassium chloride **OR** potassium chloride **OR** potassium chloride  •  [COMPLETED] Insert peripheral IV **AND** sodium chloride  •  tiZANidine    Labs:    CBC    Results from last 7 days   Lab Units 10/11/21  0325 10/10/21  0442 10/08/21  0533 10/07/21  0546 10/06/21  0906 10/05/21  1951   WBC 10*3/mm3 17.10* 10.10 13.00* 15.60* 19.60* 14.20*   HEMOGLOBIN g/dL 8.3* 9.2* 11.1* 11.7* 12.6* 11.7*   PLATELETS 10*3/mm3 439 363 300 317 316 392     BMP   Results from last 7 days   Lab Units 10/12/21  0548 10/11/21  0325 10/10/21  0442 10/09/21  0531 10/09/21  0117 10/08/21  1205 10/08/21  0533 10/08/21  0042 10/08/21  0042 10/07/21  0547 10/06/21  0906 10/05/21  1951 10/05/21  1951   SODIUM mmol/L 132* 129* 130*  --  130* 127* 127*  --  127*   < > 123*   < > 120*   POTASSIUM mmol/L 3.9 3.6 3.5  --  3.5 3.6 3.5  --  3.6   <  > 3.1*   < > 2.5*   CHLORIDE mmol/L 91* 88* 91*  --  92* 87* 89*  --  89*   < > 82*   < > 75*   CO2 mmol/L 29.0 27.0 30.0*  --  30.0* 26.0 25.0  --  26.0   < > 25.0   < > 21.0*   BUN mg/dL 20 16 15  --  18 21 20  --  22   < > 14   < > 16   CREATININE mg/dL 0.62* 0.62* 0.59* 0.59* 0.62* 0.66* 0.62*   < > 0.65*   < > 0.67*   < > 0.96   GLUCOSE mg/dL 135* 114* 87  --  111* 153* 103*  --  128*   < > 101*   < > 178*   MAGNESIUM mg/dL  --  1.8 1.9 2.2  --   --   --   --   --   --  1.5*  --  1.5*   PHOSPHORUS mg/dL  --  2.1* 2.3*  --   --   --   --   --   --   --   --   --   --     < > = values in this interval not displayed.     Cr Clearance Estimated Creatinine Clearance: 81.7 mL/min (A) (by C-G formula based on SCr of 0.62 mg/dL (L)).  Coag   Results from last 7 days   Lab Units 10/05/21  1951   INR  1.18*   APTT seconds 30.8     HbA1C No results found for: HGBA1C  Blood Glucose No results found for: POCGLU  Infection   Results from last 7 days   Lab Units 10/05/21  2001 10/05/21  1957 10/05/21  1951   BLOODCX  No growth at 5 days  --  No growth at 5 days   URINECX   --  No growth  --      CMP   Results from last 7 days   Lab Units 10/12/21  0548 10/11/21  0325 10/10/21  0442 10/09/21  0531 10/09/21  0117 10/08/21  1205 10/08/21  0533 10/08/21  0042 10/08/21  0042 10/07/21  1252 10/07/21  0547 10/06/21  0906 10/06/21  0906 10/05/21  1951 10/05/21  1951   SODIUM mmol/L 132* 129* 130*  --  130* 127* 127*  --  127*   < > 124*   < > 123*   < > 120*   POTASSIUM mmol/L 3.9 3.6 3.5  --  3.5 3.6 3.5  --  3.6   < > 3.7   < > 3.1*   < > 2.5*   CHLORIDE mmol/L 91* 88* 91*  --  92* 87* 89*  --  89*   < > 87*   < > 82*   < > 75*   CO2 mmol/L 29.0 27.0 30.0*  --  30.0* 26.0 25.0  --  26.0   < > 24.0   < > 25.0   < > 21.0*   BUN mg/dL 20 16 15  --  18 21 20  --  22   < > 22   < > 14   < > 16   CREATININE mg/dL 0.62* 0.62* 0.59* 0.59* 0.62* 0.66* 0.62*   < > 0.65*   < > 0.65*   < > 0.67*   < > 0.96   GLUCOSE mg/dL 135* 114* 87  --   111* 153* 103*  --  128*   < > 90   < > 101*   < > 178*   ALBUMIN g/dL 2.90*  --  2.70*  --   --   --  2.50*  --   --   --  2.60*  --  3.00*  --  3.30*   BILIRUBIN mg/dL 0.7  --  0.5  --   --   --  0.9  --   --   --  0.9  --  0.8  --  0.6   ALK PHOS U/L 70  --  68  --   --   --  74  --   --   --  77  --  88  --  83   AST (SGOT) U/L 145*  --  236*  --   --   --  965*  --   --   --  814*  --  260*  --  155*   ALT (SGPT) U/L 198*  --  412*  --   --   --  881*  --   --   --  592*  --  177*  --  92*   LIPASE U/L  --   --   --   --   --   --   --   --   --   --   --   --   --   --  47    < > = values in this interval not displayed.     UA    Results from last 7 days   Lab Units 10/05/21  1957   NITRITE UA  Negative   WBC UA /HPF 31-50*   BACTERIA UA /HPF None Seen   SQUAM EPITHEL UA /HPF 7-12*   URINECX  No growth     Radiology(recent) MRI Cervical Spine With & Without Contrast    Result Date: 10/11/2021  IMPRESSION :  1.  4.8 cm enhancing mass surrounding the right lateral mass of the C1 vertebral body.  There is extension of enhancing abnormal soft tissue into the spinal canal at the C1 level.  Findings would be compatible with metastatic lesion.  No additional abnormal signal or pathologic contrast enhancement within the other cervical vertebral bodies. 2.  Multilevel degenerative change of the cervical spine as detailed above. 3.  Small right pleural effusion  Electronically Signed By-Pawan Barnard MD On:10/11/2021 4:01 PM This report was finalized on [id number] by  Pawan Barnard MD.     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  Cervical spine metastases  Hypoproliferative anemia  Panlobular COPD with emphysema  BPH with  LUTS  Chronic mucopurulent bronchitis  Hepatic steatosis  Hepatic transaminase derangement  Atherosclerotic heart disease of native coronary arteries with angina pectoris  Coronary calcifications  Chronic kidney disease stage II  Microscopic hematuria  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

## 2021-10-12 NOTE — SIGNIFICANT NOTE
10/12/21 1710   OTHER   Discipline physical therapist   Rehab Time/Intention   Session Not Performed unable to evaluate, medical status change  (RN declined PT treatment d/t status change)

## 2021-10-12 NOTE — PLAN OF CARE
Goal Outcome Evaluation:  Problem: Adult Inpatient Plan of Care  Goal: Plan of Care Review  Outcome: Ongoing, Progressing  Goal: Patient-Specific Goal (Individualized)  Outcome: Ongoing, Progressing  Goal: Absence of Hospital-Acquired Illness or Injury  Outcome: Ongoing, Progressing  Intervention: Identify and Manage Fall Risk  Recent Flowsheet Documentation  Taken 10/12/2021 0400 by Talya Cheney RN  Safety Promotion/Fall Prevention:   safety round/check completed   room organization consistent   fall prevention program maintained   clutter free environment maintained   assistive device/personal items within reach  Taken 10/12/2021 0005 by Talya Cheney RN  Safety Promotion/Fall Prevention:   safety round/check completed   room organization consistent   fall prevention program maintained   clutter free environment maintained   assistive device/personal items within reach  Taken 10/11/2021 2200 by Talya Cheney RN  Safety Promotion/Fall Prevention:   safety round/check completed   room organization consistent   fall prevention program maintained   clutter free environment maintained   assistive device/personal items within reach  Taken 10/11/2021 2025 by Talya Cheney RN  Safety Promotion/Fall Prevention:   safety round/check completed   room organization consistent   fall prevention program maintained   clutter free environment maintained   assistive device/personal items within reach  Intervention: Prevent Skin Injury  Recent Flowsheet Documentation  Taken 10/12/2021 0400 by Talya Cheney RN  Skin Protection: adhesive use limited  Taken 10/12/2021 0005 by Talya Cheney RN  Skin Protection: adhesive use limited  Taken 10/11/2021 2025 by Talya Cheney RN  Skin Protection: adhesive use limited  Intervention: Prevent Infection  Recent Flowsheet Documentation  Taken 10/12/2021 0400 by Talya Cheney RN  Infection Prevention:   hand hygiene promoted   personal protective equipment utilized   rest/sleep promoted  Taken  10/12/2021 0005 by Talya Cheney RN  Infection Prevention:   hand hygiene promoted   personal protective equipment utilized   rest/sleep promoted  Taken 10/11/2021 2200 by Talya Cheney RN  Infection Prevention:   hand hygiene promoted   personal protective equipment utilized   rest/sleep promoted  Taken 10/11/2021 2025 by Talya Cheney RN  Infection Prevention:   hand hygiene promoted   rest/sleep promoted   personal protective equipment utilized  Goal: Optimal Comfort and Wellbeing  Outcome: Ongoing, Progressing  Intervention: Provide Person-Centered Care  Recent Flowsheet Documentation  Taken 10/12/2021 0400 by Talya Cheney RN  Trust Relationship/Rapport: care explained  Taken 10/12/2021 0005 by Talya Cheney RN  Trust Relationship/Rapport: care explained  Taken 10/11/2021 2025 by Talya Cheney RN  Trust Relationship/Rapport: care explained  Goal: Readiness for Transition of Care  Outcome: Ongoing, Progressing     Problem: Adjustment to Illness (Sepsis/Septic Shock)  Goal: Optimal Coping  Outcome: Ongoing, Progressing  Intervention: Optimize Psychosocial Adjustment to Illness  Recent Flowsheet Documentation  Taken 10/12/2021 0400 by Talya Cheney RN  Family/Support System Care: support provided  Taken 10/12/2021 0005 by Talya Cheney RN  Family/Support System Care: support provided  Taken 10/11/2021 2025 by Talya Chneey RN  Supportive Measures: active listening utilized  Family/Support System Care: support provided     Problem: Bleeding (Sepsis/Septic Shock)  Goal: Absence of Bleeding  Outcome: Ongoing, Progressing     Problem: Glycemic Control Impaired (Sepsis/Septic Shock)  Goal: Blood Glucose Level Within Desired Range  Outcome: Ongoing, Progressing     Problem: Hemodynamic Instability (Sepsis/Septic Shock)  Goal: Effective Tissue Perfusion  Outcome: Ongoing, Progressing     Problem: Infection (Sepsis/Septic Shock)  Goal: Absence of Infection Signs and Symptoms  Outcome: Ongoing, Progressing  Intervention:  Prevent or Manage Infection Progression  Recent Flowsheet Documentation  Taken 10/12/2021 0400 by Talya Cheney RN  Infection Prevention:   hand hygiene promoted   personal protective equipment utilized   rest/sleep promoted  Taken 10/12/2021 0005 by Talya Cheney RN  Infection Prevention:   hand hygiene promoted   personal protective equipment utilized   rest/sleep promoted  Taken 10/11/2021 2200 by Talya Cheney RN  Infection Prevention:   hand hygiene promoted   personal protective equipment utilized   rest/sleep promoted  Taken 10/11/2021 2025 by Talya Cheney RN  Infection Prevention:   hand hygiene promoted   rest/sleep promoted   personal protective equipment utilized     Problem: Nutrition Impaired (Sepsis/Septic Shock)  Goal: Optimal Nutrition Intake  Outcome: Ongoing, Progressing     Problem: Respiratory Compromise (Sepsis/Septic Shock)  Goal: Effective Oxygenation and Ventilation  Outcome: Ongoing, Progressing  Intervention: Promote Airway Secretion Clearance  Recent Flowsheet Documentation  Taken 10/12/2021 0400 by Talya Cheney RN  Activity Management: activity adjusted per tolerance  Taken 10/12/2021 0005 by Talya Cheney RN  Activity Management: activity adjusted per tolerance  Taken 10/11/2021 2025 by Talya Cheney RN  Activity Management: activity adjusted per tolerance  Cough And Deep Breathing: done independently per patient     Problem: Fall Injury Risk  Goal: Absence of Fall and Fall-Related Injury  Outcome: Ongoing, Progressing  Intervention: Identify and Manage Contributors to Fall Injury Risk  Recent Flowsheet Documentation  Taken 10/11/2021 2025 by Talya Cheney RN  Medication Review/Management: medications reviewed  Intervention: Promote Injury-Free Environment  Recent Flowsheet Documentation  Taken 10/12/2021 0400 by Talya Cheney RN  Safety Promotion/Fall Prevention:   safety round/check completed   room organization consistent   fall prevention program maintained   clutter free  environment maintained   assistive device/personal items within reach  Taken 10/12/2021 0005 by Talya Cheney RN  Safety Promotion/Fall Prevention:   safety round/check completed   room organization consistent   fall prevention program maintained   clutter free environment maintained   assistive device/personal items within reach  Taken 10/11/2021 2200 by Talya Cheney RN  Safety Promotion/Fall Prevention:   safety round/check completed   room organization consistent   fall prevention program maintained   clutter free environment maintained   assistive device/personal items within reach  Taken 10/11/2021 2025 by Talya Cheney RN  Safety Promotion/Fall Prevention:   safety round/check completed   room organization consistent   fall prevention program maintained   clutter free environment maintained   assistive device/personal items within reach     Problem: Skin Injury Risk Increased  Goal: Skin Health and Integrity  Outcome: Ongoing, Progressing  Intervention: Optimize Skin Protection  Recent Flowsheet Documentation  Taken 10/12/2021 0400 by Talya Cheney RN  Pressure Reduction Techniques:   frequent weight shift encouraged   weight shift assistance provided  Pressure Reduction Devices: pressure-redistributing mattress utilized  Skin Protection: adhesive use limited  Taken 10/12/2021 0005 by Talya Cheney RN  Pressure Reduction Techniques:   frequent weight shift encouraged   weight shift assistance provided  Pressure Reduction Devices: pressure-redistributing mattress utilized  Skin Protection: adhesive use limited  Taken 10/11/2021 2025 by Talya Cheney RN  Pressure Reduction Techniques:   frequent weight shift encouraged   weight shift assistance provided  Pressure Reduction Devices: pressure-redistributing mattress utilized  Skin Protection: adhesive use limited

## 2021-10-12 NOTE — PROGRESS NOTES
"NEPHROLOGY PROGRESS NOTE------[facility]/OPT    [facility]  [phone]  Uche Mojica MD      Patient Care Team:  Herve Baez MD as PCP - General (Family Medicine)  Uche Mojica MD as Consulting Physician (Nephrology)  Estefania Sanchez MD as Consulting Physician (Hematology and Oncology)      Provider:  Uche Mojica MD  Patient Name: Prabhjot Roldan  :  1935    SUBJECTIVE:  F/U Hyponatremia  No chest pain, increased dyspnea    Medication:  acetylcysteine, 2 mL, Nebulization, BID - RT  Acetylcysteine, 600 mg, Oral, BID  aspirin, 81 mg, Oral, Daily  budesonide, 1 mg, Nebulization, BID - RT  fentaNYL, 1 patch, Transdermal, Q72H   And  Check Fentanyl Patch Placement, 1 each, Does not apply, Q12H  dilTIAZem CD, 180 mg, Oral, Q24H  docusate sodium, 100 mg, Oral, BID  enoxaparin, 40 mg, Subcutaneous, Daily  furosemide, 40 mg, Oral, Daily  ipratropium-albuterol, 3 mL, Nebulization, Q4H - RT  methylPREDNISolone sodium succinate, 60 mg, Intravenous, Q8H  sodium chloride, 2 g, Oral, TID With Meals           OBJECTIVE    Vital Sign Min/Max for last 24 hours  Temp  Min: 97.2 °F (36.2 °C)  Max: 97.6 °F (36.4 °C)   BP  Min: 97/53  Max: 104/57   Pulse  Min: 90  Max: 126   Resp  Min: 16  Max: 26   SpO2  Min: 91 %  Max: 100 %   No data recorded   Weight  Min: 84 kg (185 lb 3 oz)  Max: 84 kg (185 lb 3 oz)     Flowsheet Rows      First Filed Value   Admission Height  175.3 cm (69\") Documented at 10/05/2021 1928   Admission Weight  86.2 kg (190 lb) Documented at 10/05/2021 1928          I/O this shift:  In: -   Out: 700 [Urine:700]  I/O last 3 completed shifts:  In: 360 [P.O.:360]  Out: 850 [Urine:850]    Physical Exam:  General Appearance: alert, appears stated age and cooperative  Head: normocephalic, without obvious abnormality and atraumatic  Eyes: conjunctivae and sclerae normal and no icterus  Neck: supple and no JVD  Lungs: diminished BS  Heart: " regular rhythm & normal rate and normal S1, S2  Chest: Wall no abnormalities observed  Abdomen: normal bowel sounds and soft non-tender  Extremities: moves extremities well, no edema, no cyanosis and no redness  Skin: no bleeding, bruising or rash, turgor normal, color normal and no lesions noted  Neurologic: Alert, and oriented. No focal deficits    Labs:    WBC WBC   Date Value Ref Range Status   10/11/2021 17.10 (H) 3.40 - 10.80 10*3/mm3 Final   10/10/2021 10.10 3.40 - 10.80 10*3/mm3 Final      HGB Hemoglobin   Date Value Ref Range Status   10/11/2021 8.3 (L) 13.0 - 17.7 g/dL Final   10/10/2021 9.2 (L) 13.0 - 17.7 g/dL Final      HCT Hematocrit   Date Value Ref Range Status   10/11/2021 24.3 (L) 37.5 - 51.0 % Final   10/10/2021 26.7 (L) 37.5 - 51.0 % Final      Platlets No results found for: LABPLAT   MCV MCV   Date Value Ref Range Status   10/11/2021 88.4 79.0 - 97.0 fL Final   10/10/2021 88.6 79.0 - 97.0 fL Final          Sodium Sodium   Date Value Ref Range Status   10/12/2021 132 (L) 136 - 145 mmol/L Final   10/11/2021 129 (L) 136 - 145 mmol/L Final   10/10/2021 130 (L) 136 - 145 mmol/L Final      Potassium Potassium   Date Value Ref Range Status   10/12/2021 3.9 3.5 - 5.2 mmol/L Final   10/11/2021 3.6 3.5 - 5.2 mmol/L Final   10/10/2021 3.5 3.5 - 5.2 mmol/L Final      Chloride Chloride   Date Value Ref Range Status   10/12/2021 91 (L) 98 - 107 mmol/L Final   10/11/2021 88 (L) 98 - 107 mmol/L Final   10/10/2021 91 (L) 98 - 107 mmol/L Final      CO2 CO2   Date Value Ref Range Status   10/12/2021 29.0 22.0 - 29.0 mmol/L Final   10/11/2021 27.0 22.0 - 29.0 mmol/L Final   10/10/2021 30.0 (H) 22.0 - 29.0 mmol/L Final      BUN BUN   Date Value Ref Range Status   10/12/2021 20 8 - 23 mg/dL Final   10/11/2021 16 8 - 23 mg/dL Final   10/10/2021 15 8 - 23 mg/dL Final      Creatinine Creatinine   Date Value Ref Range Status   10/12/2021 0.62 (L) 0.76 - 1.27 mg/dL Final   10/11/2021 0.62 (L) 0.76 - 1.27 mg/dL Final    10/10/2021 0.59 (L) 0.76 - 1.27 mg/dL Final      Calcium Calcium   Date Value Ref Range Status   10/12/2021 9.0 8.6 - 10.5 mg/dL Final   10/11/2021 8.5 (L) 8.6 - 10.5 mg/dL Final   10/10/2021 8.4 (L) 8.6 - 10.5 mg/dL Final      PO4 No components found for: PO4   Albumin Albumin   Date Value Ref Range Status   10/12/2021 2.90 (L) 3.50 - 5.20 g/dL Final   10/10/2021 2.70 (L) 3.50 - 5.20 g/dL Final      Magnesium Magnesium   Date Value Ref Range Status   10/11/2021 1.8 1.6 - 2.4 mg/dL Final   10/10/2021 1.9 1.6 - 2.4 mg/dL Final      Uric Acid No components found for: URIC ACID     Imaging Results (Last 72 Hours)     Procedure Component Value Units Date/Time    SCANNED - IMAGING [[id number]] Resulted: 10/05/21     Updated: 10/12/21 1047    MRI Cervical Spine With & Without Contrast [[id number]] Collected: 10/11/21 1509     Updated: 10/11/21 1603    Narrative:         DATE OF EXAM:   10/11/2021 2:34 PM     PROCEDURE:   MRI CERVICAL SPINE W WO CONTRAST-     INDICATIONS:   S6hhhcihageq lesion and spinal stenosis; A41.9-Sepsis, unspecified  organism; L22-Diaper dermatitis; J18.9-Pneumonia, unspecified organism;  E87.6-Jsmu-rsprjepfcw and hyponatremia; E87.6-Hypokalemia;  R53.1-Weakness; R91.8-Other nonspecific abnormal finding of lung field;  S44-Gipomnciv for follow-up examination after completed treatment for  conditions other than malignant neoplasm     COMPARISON:  No Comparisons Available     TECHNIQUE:   Standard MR pulse sequences of the cervical spine were obtained before  and after the intravenous administration of 17 ml of ProHance contrast.     FINDINGS: Image quality is degraded by patient motion artifact on all  pulse sequences.  There normal height and alignment of the cervical vertebral bodies.   There is abnormal decreased T1 and T2 signal involving the right lateral  mass of C1 vertebral body.  Postcontrast images demonstrate enhancement  within the right lateral mass of C1 vertebral body as well as within  the  paravertebral soft tissues.  There is abnormal enhancing soft tissue  within the spinal canal at this level.  Overall size of the abnormal  enhancement measures 4.8 x 4.4 cm in size.  This would be consistent  with the patient's history of metastatic lesion.  There is normal signal  within the other cervical vertebral bodies.  No other pathologic  contrast enhancement identified.     There is normal signal within the cervical spinal cord.     Based on sagittal images there appears to be a small right pleural  effusion.     Axial images demonstrate:     C2/3: Mild posterior disc bulge.  There is borderline spinal canal  stenosis.  There are degenerative facet changes bilaterally but without  definite neural foraminal narrowing.     C3/4: Minimal posterior disc bulge and degenerative facet change  bilaterally.  There is  Spinal canal stenosis.  There is moderate bilateral neural foraminal  narrowing.     4/5: Mild diffuse posterior disc bulge and moderate degenerative facet  change, right greater than left.  There is bilateral uncovertebral joint  spurring.  There is moderate bilateral neural foraminal narrowing.     C5/6: Mild diffuse posterior disc bulge.  There is bilateral  uncovertebral joint spurring and degenerative facet change bilaterally.   There is severe left and moderate right neural foraminal narrowing.   There is borderline spinal canal stenosis.     C6/7: Minimal posterior disc bulge.  There are mild degenerative facet  changes bilaterally.  There is no spinal canal stenosis.  There is  bilateral uncovertebral joint spurring mild degenerative facet change  causes mild bilateral neural foraminal narrowing.     C7/T1: No significant disc bulge.  There are mild degenerative facet  changes bilaterally.  There is no spinal canal stenosis or neural  foraminal narrowing.       Impression:      IMPRESSION :      1.  4.8 cm enhancing mass surrounding the right lateral mass of the C1  vertebral body.  There  is extension of enhancing abnormal soft tissue  into the spinal canal at the C1 level.  Findings would be compatible  with metastatic lesion.  No additional abnormal signal or pathologic  contrast enhancement within the other cervical vertebral bodies.  2.  Multilevel degenerative change of the cervical spine as detailed  above.  3.  Small right pleural effusion     Electronically Signed By-Pawan Barnard MD On:10/11/2021 4:01 PM  This report was finalized on [id number] by  Pawan Barnard MD.    MRI Outside Films [[id number]] Resulted: 10/11/21 1355     Updated: 10/11/21 1355    Narrative:      This procedure was auto-finalized with no dictation required.    CT Cervical Spine Without Contrast [[id number]] Collected: 10/09/21 1659     Updated: 10/09/21 1705    Narrative:      DATE OF EXAM:  10/9/2021 4:27 PM     PROCEDURE:  CT CERVICAL SPINE WO CONTRAST-     INDICATIONS:   Neck pain, acute, known malignancy; A41.9-Sepsis, unspecified organism;  L22-Diaper dermatitis; J18.9-Pneumonia, unspecified organism;  E87.1-Hkzk-wrqxxyodur and hyponatremia; E87.6-Hypokalemia;  R53.1-Weakness; R91.8-Other nonspecific abnormal finding of lung field     COMPARISON:   No Comparisons Available     TECHNIQUE:  CT scan of the cervical spine without IV contrast. Automated exposure  control and iterative construction methods were used.      FINDINGS:  No acute fracture is identified. There are destructive changes involving  the right C1 lateral mass. There is minimal anterolisthesis of C4 on C5.  The vertebral body heights appear normal. There are moderate discogenic  changes at C5-C6 through T1-2. No high-grade spinal canal stenosis is  identified. There is uncovertebral hypertrophy and facet arthropathy at  several levels, resulting in up to severe neural foraminal stenosis on  the right at C3-4, on the right at C4-5, and bilaterally at C5-6. The  prevertebral soft tissues are unremarkable.       Impression:      1.Osseous  destructive changes involving right C1 lateral mass,  concerning for metastatic disease.  2.Degenerative changes of cervical spine as described above.     Electronically Signed By-Bogdan Marquez MD On:10/9/2021 5:03 PM  This report was finalized on [id number] by  Bogdan Marquez MD.          Results for orders placed during the hospital encounter of 10/05/21    SCANNED - IMAGING      XR Chest 1 View    Narrative  DATE OF EXAM:  10/7/2021 10:12 AM    PROCEDURE:  XR CHEST 1 VW-    INDICATIONS:  s/p right lung biopsy; A41.9-Sepsis, unspecified organism; L22-Diaper  dermatitis; J18.9-Pneumonia, unspecified organism; E87.3-Vnvm-ufmmohkylu  and hyponatremia; E87.6-Hypokalemia; R53.1-Weakness; R91.8-Other  nonspecific abnormal finding of lung field    COMPARISON:  AP portable chest 10/5/2021. CT-guided needle biopsy of right lung Mass.  10/7/2021 at 9:44 AM.    TECHNIQUE:  Single radiographic view of the chest was obtained.    FINDINGS:  No pneumothorax is seen. Right lower lobe convex masslike density at the  right lateral costophrenic angle is again noted. Small right pleural  effusion is seen. Convex opacity in the medial left base corresponds to  focal eventration of the left hemidiaphragm, which is seen to better  advantage on prior CT chest study. Interstitial thickening or fibrotic  changes are thought to be present in both lungs. Heart size is normal.  Degenerative endplate spurring in the thoracic spine. Moderately  advanced degenerative changes of both shoulders.    Impression  No visible pneumothorax status post right lower lobe lung mass biopsy  earlier today.    Electronically Signed By-Opal Perales MD On:10/7/2021 10:17 AM  This report was finalized on [id number] by  Opal Perales MD.      XR Chest 1 View    Narrative  Examination: XR CHEST 1 VW-    Date of Exam: 10/5/2021 8:10 PM    Indication: Weakness.    Comparison: 05/20/2015    Technique: 1 view of the chest    FINDINGS:  The heart size is  within normal. There is a lobulated opacity in the  inferior lateral right chest that measures about 5.5 cm; the lower  margin of this is obscured. There is some adjacent pleural thickening or  fluid in the inferior lateral right chest. These findings have developed  since the 2015 exam. There may be some minor patchy airspace opacity in  the lateral aspect of the left midlung. There is aortic calcification.  No significant bone abnormality.    Impression  1. There is an abnormality in the right lower chest. Part of this has a  masslike configuration. It may be due to parenchymal parenchymal  disease, pleural disease, or both. A lung mass is possible. Further  evaluation with chest CT with contrast is recommended.  2. Question some minor opacity in the lateral left midlung which may be  due to pneumonia.    Electronically Signed By-Margarita Cooper MD On:10/5/2021 9:06 PM  This report was finalized on [id number] by  Margarita Cooper MD.            ASSESSMENT / PLAN      Sepsis (HCC)    ·  Hypokalemia/hypomagnesemia-likley related to thiazides. we will replace.   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 Urine osm > 500, Jess 27. exacerbated on thiazides. TSH is normal.  No paraproteins   · pneumonia  · Hypertension-his blood pressure was low admission hold meds.    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 NaCl and Lasix daily  Extra lasix 40 IV x 1 today  Monitor UOP, cr, lytes        Uche Mojica MD  [facility]  [phone]  10/12/21  11:17 EDT

## 2021-10-12 NOTE — PROGRESS NOTES
"PULMONARY CRITICAL CARE Progress  NOTE      PATIENT IDENTIFICATION:  Name: Prabhjot Roldan  MRN: [id number]  :  1935     Age: 86 y.o.  Sex: male    DATE OF Note:  10/12/2021   Referring Physician: Herve Baez MD                  Subjective:   Still requiring 5 L oxygen supplement    No SOB no chest pain, no nausea or vomiting, no change in bowel habit, no dysuria,  no new  skin rash or itching.      Objective:  tMax 24 hrs: Temp (24hrs), Av.8 °F (36.6 °C), Min:97.2 °F (36.2 °C), Max:98.8 °F (37.1 °C)      Vitals Ranges:   Temp:  [97.2 °F (36.2 °C)-98.8 °F (37.1 °C)] 97.2 °F (36.2 °C)  Heart Rate:  [] 108  Resp:  [16-26] 16  BP: ()/(52-66) 97/53    Intake and Output Last 3 Shifts:   I/O last 3 completed shifts:  In: 360 [P.O.:360]  Out: 850 [Urine:850]    Exam:  BP 97/53 (BP Location: Left arm, Patient Position: Lying)   Pulse 108   Temp 97.2 °F (36.2 °C) (Oral)   Resp 16   Ht 175.3 cm (69\")   Wt 84 kg (185 lb 3 oz)   SpO2 92%   BMI 27.35 kg/m²     General Appearance: Alert awake follows simple commands  HEENT:  normocephalic, without obvious abnormality, Conjunctiva/corneas clear,.  Normal external ear canals, Nares normal, no drainage     Neck:  C-collar in place Supple, symmetrical, trachea midline. No JVD.  Lungs /Chest wall:   Bilateral basal rhonchi, respirations unlabored symmetrical wall movement.     Heart:  Regular rate and rhythm, systolic murmur PMI left sternal border  Abdomen: Soft, non-tender, no masses, no organomegaly.    Extremities: Trace edema no clubbing or Cyanosis        Medications:    Current Facility-Administered Medications:   •  acetylcysteine (MUCOMYST) 20 % nebulizer solution 2 mL, 2 mL, Nebulization, BID - RT, Herve Baez MD, 4 mL at 10/12/21 0714  •  Acetylcysteine capsule 600 mg, 600 mg, Oral, BID, Delia Pacheco MD, 600 mg at 10/12/21 0819  •  aspirin EC tablet 81 mg, 81 mg, Oral, Daily, Herve Baez MD, 81 mg at 10/12/21 0819  •  " bisacodyl (DULCOLAX) suppository 10 mg, 10 mg, Rectal, Daily PRN, Kennedi Cárdenas APRN  •  budesonide (PULMICORT) nebulizer solution 0.5 mg, 0.5 mg, Nebulization, BID - RT, Herve Baez MD, 0.5 mg at 10/12/21 0714  •  fentaNYL (DURAGESIC) 12 MCG/HR 1 patch, 1 patch, Transdermal, Q72H, 1 patch at 10/10/21 1203 **AND** Check Fentanyl Patch Placement, 1 each, Does not apply, Q12H, Kennedi Cárdenas APRN  •  dilTIAZem CD (CARDIZEM CD) 24 hr capsule 180 mg, 180 mg, Oral, Q24H, Herve Baez MD, 180 mg at 10/12/21 0819  •  docusate sodium (COLACE) capsule 100 mg, 100 mg, Oral, BID, Kennedi Cárdenas APRN, 100 mg at 10/12/21 0819  •  enoxaparin (LOVENOX) syringe 40 mg, 40 mg, Subcutaneous, Daily, Herve Baez MD, 40 mg at 10/11/21 1505  •  furosemide (LASIX) tablet 40 mg, 40 mg, Oral, Daily, Uche Mojica MD, 40 mg at 10/11/21 1505  •  HYDROcodone-acetaminophen (NORCO) 5-325 MG per tablet 1 tablet, 1 tablet, Oral, Q4H PRN, Herve Baez MD, 1 tablet at 10/12/21 0819  •  ipratropium-albuterol (DUO-NEB) nebulizer solution 3 mL, 3 mL, Nebulization, 4x Daily - RT, Herve Baez MD, 3 mL at 10/12/21 0714  •  magnesium hydroxide (MILK OF MAGNESIA) suspension 10 mL, 10 mL, Oral, Daily PRN, Kennedi Cárdenas APRN, 10 mL at 10/10/21 1203  •  Magnesium Sulfate 2 gram Bolus, followed by 8 gram infusion (total Mg dose 10 grams)- Mg less than or equal to 1mg/dL, 2 g, Intravenous, PRN **OR** Magnesium Sulfate 2 gram / 50mL Infusion (GIVE X 3 BAGS TO EQUAL 6GM TOTAL DOSE) - Mg 1.1 - 1.5 mg/dl, 2 g, Intravenous, PRN, Last Rate: 25 mL/hr at 10/08/21 0340, 2 g at 10/08/21 0340 **OR** Magnesium Sulfate 4 gram infusion- Mg 1.6-1.9 mg/dL, 4 g, Intravenous, PRN, Herve Baez MD  •  methylPREDNISolone sodium succinate (SOLU-Medrol) injection 60 mg, 60 mg, Intravenous, Q8H, Delia Pacheco MD, 60 mg at 10/12/21 0819  •  potassium chloride (K-DUR,KLOR-CON) CR tablet 40 mEq, 40 mEq, Oral, PRN **OR** potassium chloride  (KLOR-CON) packet 40 mEq, 40 mEq, Oral, PRN **OR** potassium chloride 10 mEq in 100 mL IVPB, 10 mEq, Intravenous, Q1H PRN, Herve Baez MD  •  [COMPLETED] Insert peripheral IV, , , Once **AND** sodium chloride 0.9 % flush 10 mL, 10 mL, Intravenous, PRN, Herve Baez MD, 10 mL at 10/12/21 0818  •  sodium chloride tablet 2 g, 2 g, Oral, TID With Meals, Uche Mojica MD, 2 g at 10/12/21 0819  •  tiZANidine (ZANAFLEX) tablet 4 mg, 4 mg, Oral, Nightly PRN, Herve Baez MD    Data Review:  All labs (24hrs):   Recent Results (from the past 24 hour(s))   Comprehensive Metabolic Panel    Collection Time: 10/12/21  5:48 AM    Specimen: Blood   Result Value Ref Range    Glucose 135 (H) 65 - 99 mg/dL    BUN 20 8 - 23 mg/dL    Creatinine 0.62 (L) 0.76 - 1.27 mg/dL    Sodium 132 (L) 136 - 145 mmol/L    Potassium 3.9 3.5 - 5.2 mmol/L    Chloride 91 (L) 98 - 107 mmol/L    CO2 29.0 22.0 - 29.0 mmol/L    Calcium 9.0 8.6 - 10.5 mg/dL    Total Protein 7.0 6.0 - 8.5 g/dL    Albumin 2.90 (L) 3.50 - 5.20 g/dL    ALT (SGPT) 198 (H) 1 - 41 U/L    AST (SGOT) 145 (H) 1 - 40 U/L    Alkaline Phosphatase 70 39 - 117 U/L    Total Bilirubin 0.7 0.0 - 1.2 mg/dL    eGFR Non African Amer 124 >60 mL/min/1.73    Globulin 4.1 gm/dL    A/G Ratio 0.7 g/dL    BUN/Creatinine Ratio 32.3 (H) 7.0 - 25.0    Anion Gap 12.0 5.0 - 15.0 mmol/L   [facility] [place] [facility]    Collection Time: 10/12/21  5:48 AM   Result Value Ref Range    Extra Tube Hold for add-ons.         Imaging:  MRI Cervical Spine With & Without Contrast  Narrative:    DATE OF EXAM:   10/11/2021 2:34 PM     PROCEDURE:   MRI CERVICAL SPINE W WO CONTRAST-     INDICATIONS:   I2ruepedbeat lesion and spinal stenosis; A41.9-Sepsis, unspecified  organism; L22-Diaper dermatitis; J18.9-Pneumonia, unspecified organism;  E87.1-Pobk-lkkfomzurz and hyponatremia; E87.6-Hypokalemia;  R53.1-Weakness; R91.8-Other nonspecific abnormal finding of lung field;  E59-Fgcrmjiff for follow-up examination after  completed treatment for  conditions other than malignant neoplasm     COMPARISON:  No Comparisons Available     TECHNIQUE:   Standard MR pulse sequences of the cervical spine were obtained before  and after the intravenous administration of 17 ml of ProHance contrast.     FINDINGS: Image quality is degraded by patient motion artifact on all  pulse sequences.  There normal height and alignment of the cervical vertebral bodies.   There is abnormal decreased T1 and T2 signal involving the right lateral  mass of C1 vertebral body.  Postcontrast images demonstrate enhancement  within the right lateral mass of C1 vertebral body as well as within the  paravertebral soft tissues.  There is abnormal enhancing soft tissue  within the spinal canal at this level.  Overall size of the abnormal  enhancement measures 4.8 x 4.4 cm in size.  This would be consistent  with the patient's history of metastatic lesion.  There is normal signal  within the other cervical vertebral bodies.  No other pathologic  contrast enhancement identified.     There is normal signal within the cervical spinal cord.     Based on sagittal images there appears to be a small right pleural  effusion.     Axial images demonstrate:     C2/3: Mild posterior disc bulge.  There is borderline spinal canal  stenosis.  There are degenerative facet changes bilaterally but without  definite neural foraminal narrowing.     C3/4: Minimal posterior disc bulge and degenerative facet change  bilaterally.  There is  Spinal canal stenosis.  There is moderate bilateral neural foraminal  narrowing.     4/5: Mild diffuse posterior disc bulge and moderate degenerative facet  change, right greater than left.  There is bilateral uncovertebral joint  spurring.  There is moderate bilateral neural foraminal narrowing.     C5/6: Mild diffuse posterior disc bulge.  There is bilateral  uncovertebral joint spurring and degenerative facet change bilaterally.   There is severe left and  moderate right neural foraminal narrowing.   There is borderline spinal canal stenosis.     C6/7: Minimal posterior disc bulge.  There are mild degenerative facet  changes bilaterally.  There is no spinal canal stenosis.  There is  bilateral uncovertebral joint spurring mild degenerative facet change  causes mild bilateral neural foraminal narrowing.     C7/T1: No significant disc bulge.  There are mild degenerative facet  changes bilaterally.  There is no spinal canal stenosis or neural  foraminal narrowing.     Impression: IMPRESSION :      1.  4.8 cm enhancing mass surrounding the right lateral mass of the C1  vertebral body.  There is extension of enhancing abnormal soft tissue  into the spinal canal at the C1 level.  Findings would be compatible  with metastatic lesion.  No additional abnormal signal or pathologic  contrast enhancement within the other cervical vertebral bodies.  2.  Multilevel degenerative change of the cervical spine as detailed  above.  3.  Small right pleural effusion     Electronically Signed By-Pawan Barnard MD On:10/11/2021 4:01 PM  This report was finalized on [id number] by  Pawan Barnard MD.  MRI Outside Films  This procedure was auto-finalized with no dictation required.       ASSESSMENT:  Acute hypoxic respiratory failure requiring 6 L oxygen  COPD cerebration  Possible pneumonitis/edema  Squamous cell lung cancer  Elevated liver enzymes  Metabolic encephalopathy    PLAN:  PT OT  Oxygen supplement  Oncology following  Continue steroid for now  Bronchodilator  Inhaled corticosteroids  Electrolytes/ glycemic control  DVT and GI prophylaxis.    Total Critical care time in direct medical management (   ) minutes  Delia Pcaheco MD. D, ABSM.     10/12/2021  08:22 EDT

## 2021-10-12 NOTE — NURSING NOTE
Pt has increased oxygen needs at this time.  Increased shortness of air, and increased anxiety, MD/NP notified, pt to be placed on precision flow O2,, RN will cont to monitor.

## 2021-10-12 NOTE — PROGRESS NOTES
Hematology/Oncology Inpatient Progress Note    PATIENT NAME: Prabhjot Roldan  : 1935  MRN: [id number]    CHIEF COMPLAINT: Lethargy and altered mental status    HISTORY OF PRESENT ILLNESS:    86 y.o. male presented to [facility] on 10/5/2021 with a complaint of altered mental status.  He was noted to be lethargic, hypotensive upon presentation.  Electrolyte abnormalities and community-acquired pneumonia is suspected and he was admitted.  Incidentally CT of the chest showed right lung mass lower lobe.     10/06/21  Hematology/Oncology was consulted for right lung mass.  His daughter was there.  Is having diminished appetite.  He is not able to eat due to lack of dentures since he  left home.I met with his daughter and she told this neck pain and he has been seeing pain specialist. He had MRI of cervical spine and she had stenosis in C4 and C5. He has received injection in the neck with pain relief. He has been following with primary care for intermittent rectal bleeding.  Neurosurgeon has recommended MRI of the cervical spine with gadolinium.  Patient underwent MRI of the cervical spine with gadolinium yesterday.     Past Medical History:  1 hypertension  2.  Hyperlipidemia  3.  Arthritis    Surgical History:  Hernia repair  Social History:  He was ex-smoker.  He quit smoking 40 years ago.  There is no history of alcohol abuse.    Family History:  1.  Mother had carcinoma.  She also suffered from diabetes mellitus.  2.  Father had prostate carcinoma.  He  from myocardial infarction.    Allergies:  No known allergies.    INTERVAL HISTORY:  • 10/7/2021 CT-guided lung biopsy was done.  • 10/8/2021 path report is pending.  WBC 13, hemoglobin 11.1, platelet count 300,000.   (1-40)  (1-41) total bilirubin 0.9 (0-1.2)  • 10/9/2021 right lung biopsy showed invasive squamous cell carcinoma, CK 5/6+, TTF-1 negative  • 10/10/2021 CT cervical spine showed C1 lytic lesion.  • 10/11/2021 WBC 17.10  hemoglobin 8.3, platelet count of 4 39,000, alkaline phosphatase 68    10/12/2021 MRI L spine with gadolinium:4.8 cm enhancing mass surrounding the right lateral mass of the C1  vertebral body.  There is extension of enhancing abnormal soft tissue  into the spinal canal at the C1 level.  Findings would be compatible  with metastatic lesion.  No additional abnormal signal or pathologic  • contrast enhancement within the other cervical vertebral bodies    Subjective   Patient continues to feel fatigued and sleeps most of the time Short winded    ROS:  Review of Systems   Constitutional: Positive for activity change and appetite change. Negative for chills, fatigue, fever and unexpected weight change.   HENT: Negative for congestion, dental problem, hearing loss, mouth sores, nosebleeds, sore throat and trouble swallowing.    Eyes: Negative for photophobia and visual disturbance.   Respiratory: Negative for cough and shortness of breath.    Cardiovascular: Negative for chest pain, palpitations and leg swelling.   Gastrointestinal: Negative for abdominal distention, abdominal pain, blood in stool, constipation, diarrhea, nausea and vomiting.   Endocrine: Negative for cold intolerance and heat intolerance.   Genitourinary: Negative for decreased urine volume, difficulty urinating, frequency, hematuria and urgency.   Musculoskeletal: Negative for arthralgias, back pain and gait problem.   Skin: Negative for rash and wound.   Neurological: Negative for dizziness, tremors, weakness, light-headedness, numbness and headaches.   Hematological: Negative for adenopathy. Does not bruise/bleed easily.   Psychiatric/Behavioral: Positive for confusion. Negative for hallucinations. The patient is not nervous/anxious.    All other systems reviewed and are negative.       MEDICATIONS:    Scheduled Meds:  acetylcysteine, 2 mL, Nebulization, BID - RT  Acetylcysteine, 600 mg, Oral, BID  aspirin, 81 mg, Oral,  "Daily  budesonide, 1 mg, Nebulization, BID - RT  fentaNYL, 1 patch, Transdermal, Q72H   And  Check Fentanyl Patch Placement, 1 each, Does not apply, Q12H  dilTIAZem CD, 180 mg, Oral, Q24H  docusate sodium, 100 mg, Oral, BID  enoxaparin, 40 mg, Subcutaneous, Daily  furosemide, 40 mg, Oral, Daily  ipratropium-albuterol, 3 mL, Nebulization, Q4H - RT  methylPREDNISolone sodium succinate, 60 mg, Intravenous, Q8H  sodium chloride, 2 g, Oral, TID With Meals       Continuous Infusions:      PRN Meds:  bisacodyl  •  HYDROcodone-acetaminophen  •  magnesium hydroxide  •  magnesium sulfate **OR** magnesium sulfate **OR** magnesium sulfate  •  potassium chloride **OR** potassium chloride **OR** potassium chloride  •  [COMPLETED] Insert peripheral IV **AND** sodium chloride  •  tiZANidine     ALLERGIES:  No Known Allergies    Objective    VITALS:   BP 97/53 (BP Location: Left arm, Patient Position: Lying)   Pulse 108   Temp 97.2 °F (36.2 °C) (Oral)   Resp 16   Ht 175.3 cm (69\")   Wt 84 kg (185 lb 3 oz)   SpO2 92%   BMI 27.35 kg/m²     PHYSICAL EXAM:  Physical Exam  Vitals and nursing note reviewed.   Constitutional:       General: He is not in acute distress.     Appearance: Normal appearance. He is well-developed. He is not diaphoretic.   HENT:      Head: Normocephalic and atraumatic.      Nose: Nose normal.      Mouth/Throat:      Mouth: Mucous membranes are moist.      Pharynx: Oropharynx is clear. No oropharyngeal exudate or posterior oropharyngeal erythema.   Eyes:      General: No scleral icterus.     Extraocular Movements: Extraocular movements intact.      Conjunctiva/sclera: Conjunctivae normal.      Pupils: Pupils are equal, round, and reactive to light.   Cardiovascular:      Rate and Rhythm: Normal rate and regular rhythm.      Heart sounds: Normal heart sounds. No murmur heard.      Pulmonary:      Effort: Pulmonary effort is normal. No respiratory distress.      Breath sounds: No wheezing or rales.      " Comments: There is a diminished breath sounds in the right infrascapular area  Chest:   Breasts:      Right: No supraclavicular adenopathy.      Left: No supraclavicular adenopathy.       Abdominal:      General: Bowel sounds are normal. There is no distension.      Palpations: Abdomen is soft. There is no mass.      Tenderness: There is no abdominal tenderness. There is no guarding.   Genitourinary:     Comments: Deferred   Musculoskeletal:         General: No swelling, tenderness or deformity. Normal range of motion.      Cervical back: Normal range of motion and neck supple.      Right lower leg: No edema.      Left lower leg: No edema.   Lymphadenopathy:      Cervical: No cervical adenopathy.      Upper Body:      Right upper body: No supraclavicular adenopathy.      Left upper body: No supraclavicular adenopathy.   Skin:     General: Skin is warm and dry.      Coloration: Skin is not pale.      Findings: No bruising, erythema or rash.   Neurological:      General: No focal deficit present.      Mental Status: He is alert and oriented to person, place, and time.      Coordination: Coordination normal.   Psychiatric:         Mood and Affect: Mood normal.         Behavior: Behavior normal.         Thought Content: Thought content normal.           RECENT LABS:  Lab Results (last 24 hours)     Procedure Component Value Units Date/Time    Extra Tubes [[id number]] Collected: 10/12/21 0548    Specimen: Blood, Venous Line Updated: 10/12/21 0701    Narrative:      The following orders were created for panel order Extra Tubes.  Procedure                               Abnormality         Status                     ---------                               -----------         ------                     [facility][[id number]]                                   Final result                 Please view results for these tests on the individual orders.    [facility] [[id number]] Collected: 10/12/21 0548    Specimen: Blood  Updated: 10/12/21 0701     Extra Tube Hold for add-ons.     Comment: Auto resulted.       Comprehensive Metabolic Panel [[id number]]  (Abnormal) Collected: 10/12/21 0548    Specimen: Blood Updated: 10/12/21 0651     Glucose 135 mg/dL      BUN 20 mg/dL      Creatinine 0.62 mg/dL      Sodium 132 mmol/L      Potassium 3.9 mmol/L      Chloride 91 mmol/L      CO2 29.0 mmol/L      Calcium 9.0 mg/dL      Total Protein 7.0 g/dL      Albumin 2.90 g/dL      ALT (SGPT) 198 U/L      AST (SGOT) 145 U/L      Alkaline Phosphatase 70 U/L      Total Bilirubin 0.7 mg/dL      eGFR Non African Amer 124 mL/min/1.73      Globulin 4.1 gm/dL      A/G Ratio 0.7 g/dL      BUN/Creatinine Ratio 32.3     Anion Gap 12.0 mmol/L     Narrative:      GFR Normal >60  Chronic Kidney Disease <60  Kidney Failure <15      Immunoglobulin Free LT Chains Blood [[id number]]  (Abnormal) Collected: 10/08/21 1205    Specimen: Blood Updated: 10/11/21 1610     Free Light Chain, Kappa 83.0 mg/L      Free Lambda Light Chains 38.1 mg/L      Kappa/Lambda Ratio 2.18    Narrative:      Performed at:  01 - [facility] [address]  [id number]  : Abdirizak Gill PhD, Phone:  [phone]    Immunofixation, Serum [[id number]]  (Abnormal) Collected: 10/08/21 1205    Specimen: Blood Updated: 10/11/21 1409     Immunofixation Result, Serum Comment:     Comment: Presence of monoclonal protein is unclear at this time. Suggest  repeat in 3 to 6 months if clinically indicated.        IgG 1804 mg/dL      IgA 578 mg/dL      IgM 66 mg/dL     Narrative:      Performed at:  01 - [facility] [address]  [id number]  : Abdirizak Gill PhD, Phone:  [phone]    Immunofixation, Urine - Urine, Clean Catch [[id number]]  (Abnormal) Collected: 10/06/21 1758    Specimen: Urine, Clean Catch Updated: 10/11/21 1409     ENE Interpretation:U Comment     Comment: Bence Vargas Protein positive; kappa type.       Narrative:      Performed  at:  01 - [facility]  [id number]  : Abdirizak Gill PhD, Phone:  [phone]    BNP [[id number]]  (Abnormal) Collected: 10/11/21 0325    Specimen: Blood Updated: 10/11/21 1217     proBNP 2,003.0 pg/mL     Narrative:      Among patients with dyspnea, NT-proBNP is highly sensitive for the detection of acute congestive heart failure. In addition NT-proBNP of <300 pg/ml effectively rules out acute congestive heart failure with 99% negative predictive value.    Results may be falsely decreased if patient taking Biotin.            PENDING RESULTS: Right lung biopsy report is pending    IMAGING REVIEWED:  CT Cervical Spine Without Contrast    Result Date: 10/9/2021  1.Osseous destructive changes involving right C1 lateral mass, concerning for metastatic disease. 2.Degenerative changes of cervical spine as described above.  Electronically Signed By-Bogdan Marquez MD On:10/9/2021 5:03 PM This report was finalized on [id number] by  Bogdan Marquez MD.      I have reviewed the patient's labs, imaging, reports, and other clinician documentation.    Assessment/Plan   ASSESSMENT:  1.Right lung lower lobe mass, characterized as hypodense lesion: Patient had a biopsy the biopsy report is pending.  Attempted aspiration of this mass was not successful and it suggestive of solid or thick purulent effusion.  He is currently receiving antibiotics with ceftriaxone.  Final Diagnosis   Mass, right lower lobe, biopsies:    Invasive poorly differentiated squamous cell carcinoma     See comment        ,7   Comment    Immunohistochemical stains were applied with valid controls and are reported as follows: The tumor is positive for cytokeratin 5/6, p40 and p63. The tumor is negative for cytokeratin 7, TTF-1 and napsin A. The immunohistochemical staining pattern supports the rendered diagnosis   No family members with him at this time.  I will divulge this information in the presence of family  members. Today his daughter was here and his metastatic disease is discussed with him  2.Anemia: He has normochromic normocytic anemia.  Iron profile is inconclusive with elevated ferritin and iron saturation 9%.  B12 and folic acid are normal. Hg is trending down and check CBC in AM. Kappa and lambda chains are both are elevated, Ratio is slightly elevated . 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  Hepatitis profile is negative.  He will require total body PET scan as an outpatient since lung biopsy showed squamous cell carcinoma.There is history of rectal bleeding intermittently and Hg continues to decline  4.  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  He is currently getting Samsca and managed by nephrologist   5.Cervical spine 1 atlas mets: CT of cervical spine showed mets to atlas. MRI cervical spine showed enhancing mass C1 consistent with mets. Radiation is the default therapy if surgeon thinks surgery is not feasible or doable with this current condition    PLAN:  1. Neurosurgeon evaluation regarding mets Cervical spine atlas which is pending  2. Radiation therapy            I discussed the patients findings and my recommendations with patient.

## 2021-10-13 NOTE — PLAN OF CARE
During shift the patient began desatting on precision flow oxygen to low 80's. Charge RN discussed options with the patient and his daughters at the bedside. The patient's daughter stated the patient wished for comfort care at this point in treatment. Dr. Baez notified. Code status changed. Patient was placed on nasal cannula oxygen. Patient seems to be resting more comfortably at this time with family at the bedside after receiving prn comfort care medications.    Problem: Adult Inpatient Plan of Care  Goal: Absence of Hospital-Acquired Illness or Injury  Intervention: Identify and Manage Fall Risk  Recent Flowsheet Documentation  Taken 10/12/2021 2200 by Mariann Cordero RN  Safety Promotion/Fall Prevention:   activity supervised   assistive device/personal items within reach   clutter free environment maintained   fall prevention program maintained   lighting adjusted   nonskid shoes/slippers when out of bed   room organization consistent   safety round/check completed  Taken 10/12/2021 2000 by Mariann Cordero RN  Safety Promotion/Fall Prevention:   activity supervised   assistive device/personal items within reach   clutter free environment maintained   fall prevention program maintained   lighting adjusted   nonskid shoes/slippers when out of bed   room organization consistent   safety round/check completed  Intervention: Prevent Skin Injury  Recent Flowsheet Documentation  Taken 10/12/2021 2000 by Mariann Cordero, RN  Skin Protection:   adhesive use limited   incontinence pads utilized   protective footwear used   skin-to-device areas padded   transparent dressing maintained   tubing/devices free from skin contact  Intervention: Prevent and Manage VTE (venous thromboembolism) Risk  Recent Flowsheet Documentation  Taken 10/12/2021 2000 by Mariann Cordero, AMILCAR  VTE Prevention/Management:   bilateral   sequential compression devices off  Intervention: Prevent Infection  Recent Flowsheet Documentation  Taken 10/12/2021 2200  by Pace, Mariann, RN  Infection Prevention:   environmental surveillance performed   equipment surfaces disinfected   hand hygiene promoted   personal protective equipment utilized   rest/sleep promoted   single patient room provided  Taken 10/12/2021 2000 by Mariann Cordero RN  Infection Prevention:   equipment surfaces disinfected   hand hygiene promoted   personal protective equipment utilized   rest/sleep promoted   single patient room provided  Goal: Optimal Comfort and Wellbeing  Intervention: Provide Person-Centered Care  Recent Flowsheet Documentation  Taken 10/12/2021 2000 by Mariann Cordero RN  Trust Relationship/Rapport:   care explained   choices provided   emotional support provided   empathic listening provided   questions answered   questions encouraged   reassurance provided   thoughts/feelings acknowledged     Problem: Adjustment to Illness (Sepsis/Septic Shock)  Goal: Optimal Coping  Intervention: Optimize Psychosocial Adjustment to Illness  Recent Flowsheet Documentation  Taken 10/12/2021 2000 by Mariann Cordero RN  Supportive Measures: active listening utilized  Family/Support System Care: support provided     Problem: Bleeding (Sepsis/Septic Shock)  Goal: Absence of Bleeding  Intervention: Minimize Bleeding Risk  Recent Flowsheet Documentation  Taken 10/12/2021 2200 by Mariann Cordero RN  Bleeding Precautions: blood pressure closely monitored  Taken 10/12/2021 2000 by Mariann Cordero RN  Bleeding Precautions: blood pressure closely monitored     Problem: Infection (Sepsis/Septic Shock)  Goal: Absence of Infection Signs and Symptoms  Intervention: Prevent or Manage Infection Progression  Recent Flowsheet Documentation  Taken 10/12/2021 2200 by Mariann Cordero RN  Infection Prevention:   environmental surveillance performed   equipment surfaces disinfected   hand hygiene promoted   personal protective equipment utilized   rest/sleep promoted   single patient room provided  Taken 10/12/2021 2000 by Mariann Cordero  RN  Infection Prevention:   equipment surfaces disinfected   hand hygiene promoted   personal protective equipment utilized   rest/sleep promoted   single patient room provided     Problem: Respiratory Compromise (Sepsis/Septic Shock)  Goal: Effective Oxygenation and Ventilation  Intervention: Promote Airway Secretion Clearance  Recent Flowsheet Documentation  Taken 10/12/2021 2000 by Mariann Cordero RN  Activity Management: activity adjusted per tolerance  Cough And Deep Breathing: unable to perform  Intervention: Optimize Oxygenation and Ventilation  Recent Flowsheet Documentation  Taken 10/12/2021 2200 by Mariann Cordero RN  Head of Bed (HOB): HOB at 30-45 degrees  Taken 10/12/2021 2000 by Marinan Cordero RN  Head of Bed (HOB): HOB at 60-90 degrees     Problem: Fall Injury Risk  Goal: Absence of Fall and Fall-Related Injury  Intervention: Identify and Manage Contributors to Fall Injury Risk  Recent Flowsheet Documentation  Taken 10/12/2021 2200 by Mariann Cordero RN  Medication Review/Management: medications reviewed  Taken 10/12/2021 2000 by Mariann Cordero RN  Medication Review/Management: medications reviewed  Intervention: Promote Injury-Free Environment  Recent Flowsheet Documentation  Taken 10/12/2021 2200 by Mariann Cordero RN  Safety Promotion/Fall Prevention:   activity supervised   assistive device/personal items within reach   clutter free environment maintained   fall prevention program maintained   lighting adjusted   nonskid shoes/slippers when out of bed   room organization consistent   safety round/check completed  Taken 10/12/2021 2000 by Mariann Cordero RN  Safety Promotion/Fall Prevention:   activity supervised   assistive device/personal items within reach   clutter free environment maintained   fall prevention program maintained   lighting adjusted   nonskid shoes/slippers when out of bed   room organization consistent   safety round/check completed     Problem: Skin Injury Risk Increased  Goal: Skin Health and  Integrity  Intervention: Optimize Skin Protection  Recent Flowsheet Documentation  Taken 10/12/2021 2200 by Mariann Cordero, RN  Head of Bed (HOB): HOB at 30-45 degrees  Taken 10/12/2021 2000 by Mariann Cordero, RN  Pressure Reduction Techniques:   frequent weight shift encouraged   pressure points protected   weight shift assistance provided  Head of Bed (HOB): HOB at 60-90 degrees  Pressure Reduction Devices:   pressure-redistributing mattress utilized   positioning supports utilized  Skin Protection:   adhesive use limited   incontinence pads utilized   protective footwear used   skin-to-device areas padded   transparent dressing maintained   tubing/devices free from skin contact   Goal Outcome Evaluation:

## 2021-10-13 NOTE — CASE MANAGEMENT/SOCIAL WORK
Case Management Discharge Note           Provided Post Acute Provider List?: Yes  Post Acute Provider List: Home Health, Nursing Home, Inpatient Rehab  Provided Post Acute Provider Quality & Resource List?: Refused  Delivered To: Patient, Support Person  Support Person: Eli  Method of Delivery: In person         Final Discharge Disposition Code: 41 -  in medical facility

## 2021-10-13 NOTE — PROGRESS NOTES
"PULMONARY CRITICAL CARE Progress  NOTE      PATIENT IDENTIFICATION:  Name: Prabhjot Roldan  MRN: [id number]  :  1935     Age: 86 y.o.  Sex: male    DATE OF Note:  10/13/2021   Referring Physician: Herve Baez MD                  Subjective:   Pt is comfort care        Objective:  tMax 24 hrs: Temp (24hrs), Av.2 °F (36.8 °C), Min:98.2 °F (36.8 °C), Max:98.2 °F (36.8 °C)      Vitals Ranges:   Temp:  [98.2 °F (36.8 °C)] 98.2 °F (36.8 °C)  Heart Rate:  [105-123] 106  Resp:  [18-24] 24  BP: (107-113)/(53-71) 110/66    Intake and Output Last 3 Shifts:   I/O last 3 completed shifts:  In: 250 [IV Piggyback:250]  Out: 1150 [Urine:1150]    Exam:  /66   Pulse 106   Temp 98.2 °F (36.8 °C) (Oral)   Resp 24   Ht 175.3 cm (69\")   Wt 84 kg (185 lb 3 oz)   SpO2 90%   BMI 27.35 kg/m²     General Appearance: unresponsive on O2  Shallow slow breathing pale   HEENT:  normocephalic, without obvious abnormality, Conjunctiva/corneas clear,.  Normal external ear canals, Nares normal, no drainage     Neck:  C-collar in place Supple, symmetrical, trachea midline. No JVD.  Lungs /Chest wall:   Bilateral basal rhonchi, respirations unlabored symmetrical wall movement.     Heart:  Regular rate and rhythm, systolic murmur PMI left sternal border  Abdomen: Soft, non-tender, no masses, no organomegaly.    Extremities: Trace edema no clubbing or Cyanosis        Medications:    Current Facility-Administered Medications:   •  acetylcysteine (MUCOMYST) 20 % nebulizer solution 2 mL, 2 mL, Nebulization, BID - RT, Herve Baez MD, 2 mL at 10/12/21 1847  •  Acetylcysteine capsule 600 mg, 600 mg, Oral, BID, Delia Pacheco MD, 600 mg at 10/12/21 2038  •  aspirin EC tablet 81 mg, 81 mg, Oral, Daily, Herve Baez MD, 81 mg at 10/12/21 0819  •  bisacodyl (DULCOLAX) suppository 10 mg, 10 mg, Rectal, Daily PRN, Kennedi Cárdenas, DAVID  •  budesonide (PULMICORT) nebulizer solution 1 mg, 1 mg, Nebulization, BID - RT, Tara, " MD Delia, 0.5 mg at 10/12/21 1852  •  fentaNYL (DURAGESIC) 12 MCG/HR 1 patch, 1 patch, Transdermal, Q72H, 1 patch at 10/10/21 1203 **AND** Check Fentanyl Patch Placement, 1 each, Does not apply, Q12H, Kennedi Cárdenas APRN  •  dilTIAZem CD (CARDIZEM CD) 24 hr capsule 180 mg, 180 mg, Oral, Q24H, Herve Baez MD, 180 mg at 10/12/21 0819  •  docusate sodium (COLACE) capsule 100 mg, 100 mg, Oral, BID, Kennedi Cárdenas APRN, 100 mg at 10/12/21 2038  •  enoxaparin (LOVENOX) syringe 40 mg, 40 mg, Subcutaneous, Daily, Herve Baez MD, 40 mg at 10/12/21 1643  •  furosemide (LASIX) injection 20 mg, 20 mg, Intravenous, Once, Smiley Villanueva APRN  •  furosemide (LASIX) tablet 40 mg, 40 mg, Oral, Daily, Uche Mojica MD, 40 mg at 10/11/21 1505  •  glycopyrrolate PF (ROBINUL) injection 0.1 mg, 0.1 mg, Intravenous, Q4H PRN, Smiley Villanueva APRN  •  HYDROcodone-acetaminophen (NORCO) 5-325 MG per tablet 1 tablet, 1 tablet, Oral, Q4H PRN, Herve Baez MD, 1 tablet at 10/13/21 0004  •  ipratropium-albuterol (DUO-NEB) nebulizer solution 3 mL, 3 mL, Nebulization, Q4H - RT, Delia Pacheco MD, 3 mL at 10/12/21 2325  •  LORazepam (ATIVAN) injection 1 mg, 1 mg, Intravenous, Q1H PRN, Smiley Villanueva APRN, 1 mg at 10/13/21 0705  •  magnesium hydroxide (MILK OF MAGNESIA) suspension 10 mL, 10 mL, Oral, Daily PRN, Kennedi Cárdenas APRN, 10 mL at 10/10/21 1203  •  Magnesium Sulfate 2 gram Bolus, followed by 8 gram infusion (total Mg dose 10 grams)- Mg less than or equal to 1mg/dL, 2 g, Intravenous, PRN **OR** Magnesium Sulfate 2 gram / 50mL Infusion (GIVE X 3 BAGS TO EQUAL 6GM TOTAL DOSE) - Mg 1.1 - 1.5 mg/dl, 2 g, Intravenous, PRN, Last Rate: 25 mL/hr at 10/08/21 0340, 2 g at 10/08/21 0340 **OR** Magnesium Sulfate 4 gram infusion- Mg 1.6-1.9 mg/dL, 4 g, Intravenous, PRN, Herve Baez MD  •  methylPREDNISolone sodium succinate (SOLU-Medrol) injection 60 mg, 60 mg, Intravenous, Q8H, Delia Pacheco,  MD, 60 mg at 10/13/21 0047  •  morphine injection 2 mg, 2 mg, Intravenous, Q1H PRN, Smiley Villanueva APRN, 2 mg at 10/13/21 0707  •  potassium chloride (K-DUR,KLOR-CON) CR tablet 40 mEq, 40 mEq, Oral, PRN **OR** potassium chloride (KLOR-CON) packet 40 mEq, 40 mEq, Oral, PRN **OR** potassium chloride 10 mEq in 100 mL IVPB, 10 mEq, Intravenous, Q1H PRN, Herve Baez MD  •  [COMPLETED] Insert peripheral IV, , , Once **AND** sodium chloride 0.9 % flush 10 mL, 10 mL, Intravenous, PRN, Herve Baez MD, 10 mL at 10/12/21 2039  •  sodium chloride tablet 2 g, 2 g, Oral, TID With Meals, Uche Mojica MD, 2 g at 10/12/21 1728  •  tiZANidine (ZANAFLEX) tablet 4 mg, 4 mg, Oral, Nightly PRN, Herve Baez MD    Data Review:  All labs (24hrs):   Recent Results (from the past 24 hour(s))   ECG 12 Lead    Collection Time: 10/12/21  9:06 PM   Result Value Ref Range    QT Interval 320 ms   POC Glucose Once    Collection Time: 10/12/21 10:22 PM    Specimen: Blood   Result Value Ref Range    Glucose 206 (H) 70 - 105 mg/dL   CBC Auto Differential    Collection Time: 10/13/21 12:13 AM    Specimen: Blood   Result Value Ref Range    WBC 8.10 3.40 - 10.80 10*3/mm3    RBC 2.54 (L) 4.14 - 5.80 10*6/mm3    Hemoglobin 7.6 (L) 13.0 - 17.7 g/dL    Hematocrit 22.8 (L) 37.5 - 51.0 %    MCV 89.9 79.0 - 97.0 fL    MCH 30.0 26.6 - 33.0 pg    MCHC 33.4 31.5 - 35.7 g/dL    RDW 15.8 (H) 12.3 - 15.4 %    RDW-SD 49.4 37.0 - 54.0 fl    MPV 8.8 6.0 - 12.0 fL    Platelets 164 140 - 450 10*3/mm3    Neutrophil % 85.1 (H) 42.7 - 76.0 %    Lymphocyte % 8.1 (L) 19.6 - 45.3 %    Monocyte % 4.7 (L) 5.0 - 12.0 %    Eosinophil % 1.4 0.3 - 6.2 %    Basophil % 0.7 0.0 - 1.5 %    Neutrophils, Absolute 6.90 1.70 - 7.00 10*3/mm3    Lymphocytes, Absolute 0.70 0.70 - 3.10 10*3/mm3    Monocytes, Absolute 0.40 0.10 - 0.90 10*3/mm3    Eosinophils, Absolute 0.10 0.00 - 0.40 10*3/mm3    Basophils, Absolute 0.10 0.00 - 0.20 10*3/mm3    nRBC 0.0 0.0 - 0.2 /100  WBC   Comprehensive Metabolic Panel    Collection Time: 10/13/21 12:13 AM    Specimen: Blood   Result Value Ref Range    Glucose 132 (H) 65 - 99 mg/dL    BUN 31 (H) 8 - 23 mg/dL    Creatinine 2.46 (H) 0.76 - 1.27 mg/dL    Sodium 140 136 - 145 mmol/L    Potassium 4.0 3.5 - 5.2 mmol/L    Chloride 100 98 - 107 mmol/L    CO2 28.0 22.0 - 29.0 mmol/L    Calcium 7.5 (L) 8.6 - 10.5 mg/dL    Total Protein 5.7 (L) 6.0 - 8.5 g/dL    Albumin 2.60 (L) 3.50 - 5.20 g/dL    ALT (SGPT) 59 (H) 1 - 41 U/L    AST (SGOT) 67 (H) 1 - 40 U/L    Alkaline Phosphatase 782 (H) 39 - 117 U/L    Total Bilirubin 0.6 0.0 - 1.2 mg/dL    eGFR Non African Amer 25 (L) >60 mL/min/1.73    Globulin 3.1 gm/dL    A/G Ratio 0.8 g/dL    BUN/Creatinine Ratio 12.6 7.0 - 25.0    Anion Gap 12.0 5.0 - 15.0 mmol/L        Imaging:  XR Chest 1 View  Narrative: DATE OF EXAM:  10/12/2021 2:45 PM     PROCEDURE:  XR CHEST 1 VW-     INDICATIONS:  Desaturation.     COMPARISON:  Radiographs 10/7/2021, 10/5/2021, and 5/20/2015. CT 10/5/2021.     TECHNIQUE:   Single radiographic AP view of the chest was obtained.     FINDINGS:  Lordotic positioning. Overlying artifacts. Worsening multifocal patchy  lung opacities, greatest in the left lung base. Partially obscured mass  in the lateral right lung base. No pneumothorax. Unchanged  cardiomediastinal contours. Calcified atherosclerotic disease in the  thoracic aorta. Chronic changes in the thoracic spine and both  shoulders. No acute osseous abnormality is identified.      Impression:    1. Worsening multifocal lung opacities, greatest in the left lung base,  likely worsening pneumonia.  2. Partially obscured mass in the lateral right lung base.     Electronically Signed By-Preston Davey MD On:10/12/2021 3:08 PM  This report was finalized on [id number] by  Preston Davey MD.       ASSESSMENT:  Acute hypoxic respiratory failure requiring 6 L oxygen  COPD cerebration  Possible pneumonitis/edema  Squamous cell lung cancer  Elevated  liver enzymes  Metabolic encephalopathy    PLAN:  Continue with comfort care    Total Critical care time in direct medical management (   ) minutes  Delia Pacheco MD. D, ABSM.     10/13/2021  11:08 EDT

## 2021-10-13 NOTE — PROGRESS NOTES
Date of Death:  10/13/2021  Cause of Death: sepsis  Final Diagnosis: sepsis due to community acquired pneumonia, squamous cell carcinoma of the lung    Presenting Problem/History of Present Illness  Active Hospital Problems    Diagnosis  POA   • Sepsis (HCC) [A41.9]  Yes      Resolved Hospital Problems   No resolved problems to display.         Hospital Course  87 y/o WM presented to the ER after being found to be lethargic and difficult to arouse.  They were uncertain about the duration of his symptom complex but he was brought to the [facility] emergency room for evaluation where he was admitted and found to have a community-acquired pneumonia as well as a large low-density mass in the right lung base.  He also had multiple electrolyte abnormalities. He was admitted and started on treatments.  Pulmonary, nephrology and oncology were consulted.  During the work up, a C1 destructive lesion was also identified.  Neurosurgery was consulted and recommended MRI of the cervical spine with gadolinium.   biopsy confirmed squamous cell carcinoma of the lung.  Radiation oncology was also consulted.  Pt remained lethargic with periods of confusion.  On 10/12, his O2 demand increased and he became more short of breath.  Early this am, the patient began to desat despite precision flow O2.  Nursing staff spoke with family at bedside and it was decided to make the patient comfort care.  Medications were given and the patient became comfortable.         Procedures Performed         Consults:   Consults     Date and Time Order Name Status Description    10/10/2021 12:24 PM Inpatient Radiation Oncology Consult Completed     10/10/2021 12:24 PM Inpatient Neurosurgery Consult Completed     10/6/2021  8:26 AM Inpatient Nephrology Consult Completed     10/6/2021  7:53 AM Inpatient Pulmonology Consult Completed     10/6/2021  7:53 AM Hematology & Oncology Inpatient Consult Completed     10/5/2021 11:43 PM Family Medicine  Consult Completed               Farhana Vuong MD  10/13/21  09:17 EDT

## 2021-10-13 NOTE — PLAN OF CARE
Goal Outcome Evaluation:  RN summoned to room by family, pt found pale, no RR, no palpable pulse, verified by this RN and Charge RN, no HR per auscultation.  2 Daughters at bedside, RN will provide support. MD to be updated!  T.O.D 07:50 a.m.

## 2021-10-14 LAB — QT INTERVAL: 320 MS

## 2021-10-19 ENCOUNTER — APPOINTMENT (OUTPATIENT)
Dept: PAIN MEDICINE | Facility: HOSPITAL | Age: 86
End: 2021-10-19

## 2023-11-14 NOTE — PLAN OF CARE
Problem: Adjustment to Illness (Sepsis/Septic Shock)  Goal: Optimal Coping  Outcome: Ongoing, Progressing     Problem: Hemodynamic Instability (Sepsis/Septic Shock)  Goal: Effective Tissue Perfusion  Outcome: Ongoing, Progressing     Problem: Infection (Sepsis/Septic Shock)  Goal: Absence of Infection Signs and Symptoms  Outcome: Ongoing, Progressing  Intervention: Prevent or Manage Infection Progression  Recent Flowsheet Documentation  Taken 10/11/2021 0843 by Liseth Thibodeaux RN  Infection Prevention: hand hygiene promoted     Problem: Respiratory Compromise (Sepsis/Septic Shock)  Goal: Effective Oxygenation and Ventilation  Outcome: Ongoing, Progressing  Intervention: Promote Airway Secretion Clearance  Recent Flowsheet Documentation  Taken 10/11/2021 0843 by Liseth Thibodeaux RN  Activity Management: sitting, edge of bed   Goal Outcome Evaluation:  Patient becomes very SOB with decreased oxygen saturation with only slight activity.                  Pupils equal, round and reactive to light, Extra-ocular movement intact, eyes are clear b/l